# Patient Record
Sex: MALE | Race: WHITE | NOT HISPANIC OR LATINO | Employment: FULL TIME | URBAN - METROPOLITAN AREA
[De-identification: names, ages, dates, MRNs, and addresses within clinical notes are randomized per-mention and may not be internally consistent; named-entity substitution may affect disease eponyms.]

---

## 2017-06-12 ENCOUNTER — LAB CONVERSION - ENCOUNTER (OUTPATIENT)
Dept: OTHER | Facility: OTHER | Age: 52
End: 2017-06-12

## 2017-06-12 LAB
GLUCOSE, PLASMA (HISTORICAL): 94 MG/DL (ref 65–99)
HBA1C MFR BLD HPLC: 5.8 % OF TOTAL HGB

## 2017-06-22 ENCOUNTER — ALLSCRIPTS OFFICE VISIT (OUTPATIENT)
Dept: OTHER | Facility: OTHER | Age: 52
End: 2017-06-22

## 2017-12-14 DIAGNOSIS — Z12.5 ENCOUNTER FOR SCREENING FOR MALIGNANT NEOPLASM OF PROSTATE: ICD-10-CM

## 2017-12-14 DIAGNOSIS — I10 ESSENTIAL (PRIMARY) HYPERTENSION: ICD-10-CM

## 2017-12-14 DIAGNOSIS — R73.01 IMPAIRED FASTING GLUCOSE: ICD-10-CM

## 2017-12-19 ENCOUNTER — LAB CONVERSION - ENCOUNTER (OUTPATIENT)
Dept: OTHER | Facility: OTHER | Age: 52
End: 2017-12-19

## 2017-12-19 ENCOUNTER — GENERIC CONVERSION - ENCOUNTER (OUTPATIENT)
Dept: OTHER | Facility: OTHER | Age: 52
End: 2017-12-19

## 2017-12-19 LAB
A/G RATIO (HISTORICAL): 1.5 (CALC) (ref 1–2.5)
ALBUMIN SERPL BCP-MCNC: 4 G/DL (ref 3.6–5.1)
ALP SERPL-CCNC: 75 U/L (ref 40–115)
ALT SERPL W P-5'-P-CCNC: 31 U/L (ref 9–46)
AST SERPL W P-5'-P-CCNC: 20 U/L (ref 10–35)
BILIRUB SERPL-MCNC: 0.3 MG/DL (ref 0.2–1.2)
BUN SERPL-MCNC: 12 MG/DL (ref 7–25)
BUN/CREA RATIO (HISTORICAL): ABNORMAL (CALC) (ref 6–22)
CALCIUM SERPL-MCNC: 9.5 MG/DL (ref 8.6–10.3)
CHLORIDE SERPL-SCNC: 104 MMOL/L (ref 98–110)
CO2 SERPL-SCNC: 29 MMOL/L (ref 20–31)
CREAT SERPL-MCNC: 0.97 MG/DL (ref 0.7–1.33)
CREATININE, RANDOM URINE (HISTORICAL): 66 MG/DL (ref 20–370)
EGFR AFRICAN AMERICAN (HISTORICAL): 103 ML/MIN/1.73M2
EGFR-AMERICAN CALC (HISTORICAL): 89 ML/MIN/1.73M2
GAMMA GLOBULIN (HISTORICAL): 2.6 G/DL (CALC) (ref 1.9–3.7)
GLUCOSE (HISTORICAL): 101 MG/DL (ref 65–99)
HBA1C MFR BLD HPLC: 5.9 % OF TOTAL HGB
MAGNESIUM, UR (HISTORICAL): 0.2 MG/DL
MICROALBUMIN/CREATININE RATIO (HISTORICAL): 3 MCG/MG CREAT
POTASSIUM SERPL-SCNC: 4.9 MMOL/L (ref 3.5–5.3)
SODIUM SERPL-SCNC: 140 MMOL/L (ref 135–146)
TOTAL PROTEIN (HISTORICAL): 6.6 G/DL (ref 6.1–8.1)

## 2017-12-21 ENCOUNTER — ALLSCRIPTS OFFICE VISIT (OUTPATIENT)
Dept: OTHER | Facility: OTHER | Age: 52
End: 2017-12-21

## 2018-01-11 NOTE — RESULT NOTES
Discussion/Summary   blood sugars have improved from last time but still in Pre-diabetes range   watch sweets and fatty food   - Dr Blair Kingsley      Verified Results  (Q) HEMOGLOBIN A1c [id number] 07:06AM Jayde Leal   REPORT COMMENT:  FASTING:YES     Test Name Result Flag Reference   HEMOGLOBIN A1c 5 8 % of total Hgb H <5 7   For someone without known diabetes, a hemoglobin   A1c value between 5 7% and 6 4% is consistent with  prediabetes and should be confirmed with a   follow-up test      For someone with known diabetes, a value <7%  indicates that their diabetes is well controlled  A1c  targets should be individualized based on duration of  diabetes, age, comorbid conditions, and other  considerations  This assay result is consistent with an increased risk  of diabetes  Currently, no consensus exists regarding use of  hemoglobin A1c for diagnosis of diabetes for children         Signatures   Electronically signed by : Ronni Holloway MD; Jun 12 2017  2:38PM EST                       (Author)

## 2018-01-13 VITALS
HEART RATE: 72 BPM | WEIGHT: 204.13 LBS | DIASTOLIC BLOOD PRESSURE: 82 MMHG | RESPIRATION RATE: 16 BRPM | BODY MASS INDEX: 31.97 KG/M2 | SYSTOLIC BLOOD PRESSURE: 122 MMHG

## 2018-01-15 NOTE — RESULT NOTES
Verified Results  (1) COMPREHENSIVE METABOLIC PANEL [id number] 06:41PH Jayde Leal     Test Name Result Flag Reference   GLUCOSE 94 mg/dL  65-99   Fasting reference interval   UREA NITROGEN (BUN) 13 mg/dL  7-25   CREATININE 0 94 mg/dL  0 70-1 33   For patients >52years of age, the reference limit  for Creatinine is approximately 13% higher for people  identified as -American  eGFR NON-AFR  AMERICAN 93 mL/min/1 73m2  > OR = 60   eGFR AFRICAN AMERICAN 108 mL/min/1 73m2  > OR = 60   BUN/CREATININE RATIO   7-81   NOT APPLICABLE (calc)   SODIUM 140 mmol/L  135-146   POTASSIUM 4 8 mmol/L  3 5-5 3   CHLORIDE 105 mmol/L     CARBON DIOXIDE 25 mmol/L  20-31   CALCIUM 9 3 mg/dL  8 6-10 3   PROTEIN, TOTAL 6 6 g/dL  6 1-8 1   ALBUMIN 4 2 g/dL  3 6-5 1   GLOBULIN 2 4 g/dL (calc)  1 9-3 7   ALBUMIN/GLOBULIN RATIO 1 8 (calc)  1 0-2 5   BILIRUBIN, TOTAL 0 5 mg/dL  0 2-1 2   ALKALINE PHOSPHATASE 77 U/L     AST 21 U/L  10-35   ALT 34 U/L  9-46     (1) LIPID PANEL, FASTING [id number] 06:48AM Jayde Leal     Test Name Result Flag Reference   CHOLESTEROL, TOTAL 182 mg/dL  125-200   HDL CHOLESTEROL 53 mg/dL  > OR = 40   TRIGLICERIDES 563 mg/dL  <150   LDL-CHOLESTEROL 109 mg/dL (calc)  <130   Desirable range <100 mg/dL for patients with CHD or  diabetes and <70 mg/dL for diabetic patients with  known heart disease  CHOL/HDLC RATIO 3 4 (calc)  < OR = 5 0   NON HDL CHOLESTEROL 129 mg/dL (calc)     Target for non-HDL cholesterol is 30 mg/dL higher than   LDL cholesterol target  (Q) HEMOGLOBIN A1c [id number] 06:48AM Jayde Leal   REPORT COMMENT:  FASTING:YES     Test Name Result Flag Reference   HEMOGLOBIN A1c 6 1 % of total Hgb H <5 7   According to ADA guidelines, hemoglobin A1c <7 0%  represents optimal control in non-pregnant diabetic  patients  Different metrics may apply to specific  patient populations  Standards of Medical Care in  [phone]  Diabetes Care   2013;36:s11-s66     For the purpose of screening for the presence of  diabetes  <5 7%       Consistent with the absence of diabetes  5 7-6 4%    Consistent with increased risk for diabetes              (prediabetes)  >or=6 5%    Consistent with diabetes     This assay result is consistent with a higher risk  of diabetes  Currently, no consensus exists for use of hemoglobin  A1c for diagnosis of diabetes for children  Discussion/Summary   diabetes test is still in Pre-diabetes range   watch sweets and fatty food   normal liver and kidney function    cholesterol looks good!    overall looks good!   - Dr Pittman Blow   Electronically signed by : Yevgeniy Wright MD; Dec 21 2016  8:22AM EST                       (Author)

## 2018-01-16 NOTE — RESULT NOTES
Verified Results  (1) URIC ACID [id number] 04:48PM Jayde Leal   REPORT COMMENT:  FASTING:NO     Test Name Result Flag Reference   URIC ACID 6 2 mg/dL  4 0-8 0   Therapeutic target for gout patients: <6 0 mg/dL       Discussion/Summary   spoke with wife   patient will call back with updates in 2 days   - Dr Joshua Shaw   Electronically signed by : Cris Caldwell MD; May 25 2016  8:31AM EST                       (Author)

## 2018-01-18 NOTE — PROGRESS NOTES
Assessment    1  Encounter for preventive health examination (V70 0) (Z00 00)   2  Essential hypertension (401 9) (I10)   3  Encounter for smoking cessation counseling (V65 42,305 1) (Z71 6,Z72 0)   4  Impaired fasting glucose (790 21) (R73 01)   5  Gout (274 9) (M10 9)   6  Need for immunization against influenza (V04 81) (Z23)    Plan  Encounter for smoking cessation counseling    · Chantix Starting Month Nathen 0 5 MG X 11 & 1 MG X 42 Oral Tablet; TAKE ONE 0 5MG  TABLET DAILY ON DAYS 1-3, THEN ONE 0 5MG TABLET TWICE DAILY ON DAYS 4-7,  THEN ONE 1MG TABLET TWICE DAILY THEREAFTER  Essential hypertension    · Losartan Potassium 50 MG Oral Tablet; TAKE 1 TABLET DAILY AS DIRECTED  Gout, Impaired fasting glucose    · (1) GLUCOSE,  FASTING; Status:Active; Requested [id number];    · (1) HEMOGLOBIN A1C; Status:Active; Requested [id number]; Health Maintenance    · Follow-up visit in 1 year Evaluation and Treatment  Follow-up  Status: Hold For -  Scheduling  Requested for: 60Kwa4442   · Always use a seat belt and shoulder strap when riding or driving a motor vehicle ;  Status:Complete;   Done: 37SVP4769   · Begin a limited exercise program ; Status:Complete;   Done: 79PJZ5530   · Begin or continue regular aerobic exercise   Gradually work up to at least 3 sessions of 30  minutes of exercise a week ; Status:Complete;   Done: 45AZO1266   · Brush your teeth freq1 and floss at least once a day ; Status:Complete;   Done:  05VYT8090   · Some eating tips that can help you lose weight ; Status:Complete;   Done: 61ZYV8905   · Use a sun block product with an SPF of 15 or more ; Status:Complete;   Done:  99BMQ2078   · Vitamins can help you get daily requirements that your diet may not be giving you ;  Status:Complete;   Done: 18QHZ4735   · We recommend routine visits to a dentist ; Status:Complete;   Done: 77XOY4407   · Call [phone] if: You have any warning signs of skin cancer ; Status:Complete;    Done: 71JNG6583   · Call 911 if: You experience a new kind of chest pain (angina) or pressure ;  Status:Complete;   Done: 16LBK3568  Need for immunization against influenza    · Fluzone Quadrivalent 0 5 ML Intramuscular Suspension Prefilled Syringe    Discussion/Summary  Impression: health maintenance visit  Currently, he eats an adequate diet and has an adequate exercise regimen  Prostate cancer screening: the risks and benefits of prostate cancer screening were discussed and prostate cancer screening is current  Testicular cancer screening: the risks and benefits of testicular cancer screening were discussed and monthly self testicular exam was advised  Colorectal cancer screening: the risks and benefits of colorectal cancer screening were discussed and colorectal cancer screening is current  Screening lab work includes glucose  He was advised to be evaluated by an ophthalmologist and a dentist  Advice and education were given regarding nutrition, weight bearing exercise, weight loss, calcium supplements, cardiovascular risk reduction, alcohol use, sunscreen use, self skin examination and seat belt use  Patient discussion: discussed with the patient  Chief Complaint  pt here for physical      History of Present Illness  HM, Adult Male: The patient is being seen for a health maintenance evaluation  The last health maintenance visit was 1 year(s) ago  General Health: The patient's health since the last visit is described as good  He has regular dental visits  He complains of vision problems  He denies hearing loss  Immunizations status: not up to date  Lifestyle:  He consumes a diverse and healthy diet  He does not have any weight concerns  He does not exercise regularly  He does not use tobacco  He denies alcohol use  He denies drug use  Reproductive health:  the patient is sexually active  birth control is not being practiced  He denies erectile dysfunction  Screening: cancer screening reviewed and updated     metabolic screening reviewed and updated  risk screening reviewed and updated  Review of Systems    Constitutional: No fever or chills, feels well, no tiredness, no recent weight gain or weight loss  Eyes: No complaints of eye pain, no red eyes, no discharge from eyes, no itchy eyes  ENT: no complaints of earache, no hearing loss, no nosebleeds, no nasal discharge, no sore throat, no hoarseness  Cardiovascular: No complaints of slow heart rate, no fast heart rate, no chest pain, no palpitations, no leg claudication, no lower extremity  Respiratory: No complaints of shortness of breath, no wheezing, no cough, no SOB on exertion, no orthopnea or PND  Gastrointestinal: No complaints of abdominal pain, no constipation, no nausea or vomiting, no diarrhea or bloody stools  Genitourinary: No complaints of dysuria, no incontinence, no hesitancy, no nocturia, no genital lesion, no testicular pain  Musculoskeletal: No complaints of arthralgia, no myalgias, no joint swelling or stiffness, no limb pain or swelling  Integumentary: No complaints of skin rash or skin lesions, no itching, no skin wound, no dry skin  Neurological: No compliants of headache, no confusion, no convulsions, no numbness or tingling, no dizziness or fainting, no limb weakness, no difficulty walking  Psychiatric: Is not suicidal, no sleep disturbances, no anxiety or depression, no change in personality, no emotional problems  Endocrine: No complaints of proptosis, no hot flashes, no muscle weakness, no erectile dysfunction, no deepening of the voice, no feelings of weakness  Hematologic/Lymphatic: No complaints of swollen glands, no swollen glands in the neck, does not bleed easily, no easy bruising  Active Problems    1  Allergic rhinitis (477 9) (J30 9)   2  Encounter for screening colonoscopy (V76 51) (Z12 11)   3  Encounter for smoking cessation counseling (V65 42,305 1) (Z71 6,Z72 0)   4   Essential hypertension (401 9) (I10)   5  Gout (274 9) (M10 9)   6  Impaired fasting glucose (790 21) (R73 01)   7  Need for immunization against influenza (V04 81) (Z23)   8  Need for pneumococcal vaccination (V03 82) (Z23)   9  Need for Tdap vaccination (V06 1) (Z23)   10  S/P cardiac cath (V45 89) (Z98 890)   11  Screening for prostate cancer (V76 44) (Z12 5)    Past Medical History    · History of hypertension (V12 59) (Z86 79)   · S/P cardiac cath (V45 89) (Z98 890)    Surgical History    · Denied: History of Reported Prior Surgical / Procedural History    Family History  Mother    · Denied: Family history of Colon cancer   · Denied: Family history of Crohn's disease without complication, unspecified  gastrointestinal tract location   · Family history of diabetes mellitus (V18 0) (Z83 3)   · Denied: Family history of liver disease  Father    · Denied: Family history of Colon cancer   · Denied: Family history of Crohn's disease without complication, unspecified  gastrointestinal tract location   · Family history of cardiac disorder (V17 49) (Z82 49)   · Denied: Family history of liver disease  Brother    · Family history of cardiac disorder (V17 49) (Z82 49)    Social History    · Alcohol use   · Current smoker (305 1) (Z72 0)   · Daily caffeine consumption, 2-3 servings a day   · No drug use   · Social alcohol use (Z78 9)    Current Meds   1  Aspirin 81 MG TABS; Therapy: (Recorded:07May2014) to Recorded   2  Fish Oil 1200 MG Oral Capsule; take 1 capsule daily; Therapy: (Recorded:22May2014) to Recorded   3  Losartan Potassium 50 MG Oral Tablet; TAKE 1 TABLET DAILY; Therapy: 09DLC8842 to (Evaluate:06Dec2016)  Requested for: 39YBJ9279; Last   Rx:09Jun2016 Ordered   4  Multiple Vitamin TABS; Therapy: (Recorded:07May2014) to Recorded    Allergies    1   No Known Drug Allergies    Vitals   Recorded: 22Dec2016 06:00PM   Heart Rate 80   Respiration 18   Systolic 076   Diastolic 84   Height 5 ft 7 48 in   Weight 201 lb 8 oz   BMI Calculated 31 11   BSA Calculated 2 04     Physical Exam    Constitutional   General appearance: No acute distress, well appearing and well nourished  Head and Face   Head and face: Normal     Palpation of the face and sinuses: No sinus tenderness  Eyes   Conjunctiva and lids: No erythema, swelling or discharge  Pupils and irises: Equal, round, reactive to light  Ophthalmoscopic examination: Normal fundi and optic discs  Ears, Nose, Mouth, and Throat   External inspection of ears and nose: Normal     Otoscopic examination: Tympanic membranes translucent with normal light reflex  Canals patent without erythema  Hearing: Normal     Nasal mucosa, septum, and turbinates: Normal without edema or erythema  Lips, teeth, and gums: Normal, good dentition  Oropharynx: Normal with no erythema, edema, exudate or lesions  Neck   Neck: Supple, symmetric, trachea midline, no masses  Thyroid: Normal, no thyromegaly  Pulmonary   Respiratory effort: No increased work of breathing or signs of respiratory distress  Percussion of chest: Normal     Auscultation of lungs: Clear to auscultation  Cardiovascular   Palpation of heart: Normal PMI, no thrills  Auscultation of heart: Normal rate and rhythm, normal S1 and S2, no murmurs  Examination of extremities for edema and/or varicosities: Normal     Chest   Chest: Normal     Abdomen   Abdomen: Non-tender, no masses  Liver and spleen: No hepatomegaly or splenomegaly  Examination for hernias: No hernias appreciated  Lymphatic   Palpation of lymph nodes in neck: No lymphadenopathy  Palpation of lymph nodes in axillae: No lymphadenopathy  Musculoskeletal   Gait and station: Normal     Inspection/palpation of digits and nails: Normal without clubbing or cyanosis  Skin   Skin and subcutaneous tissue: Normal without rashes or lesions  Palpation of skin and subcutaneous tissue: Normal turgor      Neurologic   Cranial nerves: Cranial nerves 2-12 intact  Cortical function: Normal mental status  Reflexes: 2+ and symmetric  Sensation: No sensory loss  Coordination: Normal finger to nose and heel to shin  Psychiatric   Judgment and insight: Normal     Orientation to person, place and time: Normal     Recent and remote memory: Intact  Mood and affect: Normal        Results/Data  PHQ-2 Adult Depression Screening [id number] 06:01PM User, Roxann     Test Name Result Flag Reference   PHQ-2 Adult Depression Score 0     Over the last two weeks, how often have you been bothered by any of the following problems?   Little interest or pleasure in doing things: Not at all - 0  Feeling down, depressed, or hopeless: Not at all - 0   PHQ-2 Adult Depression Screening Negative         Future Appointments    Date/Time Provider Specialty Site   06/22/2017 04:30 PM Zahra Leal MD [address] [place] [address]     Signatures   Electronically signed by : Rutherford Goodpasture, MD; Dec 22 2016  6:52PM EST                       (Author)

## 2018-01-22 VITALS
HEIGHT: 68 IN | SYSTOLIC BLOOD PRESSURE: 122 MMHG | BODY MASS INDEX: 31.75 KG/M2 | RESPIRATION RATE: 16 BRPM | WEIGHT: 209.5 LBS | DIASTOLIC BLOOD PRESSURE: 92 MMHG | HEART RATE: 88 BPM

## 2018-01-23 NOTE — RESULT NOTES
Discussion/Summary   blood sugar is borderline elevated   pre-diabetes range   normal liver and kidney function    watch sweets and fatty food   - Dr Feli Sommer      Verified Results  (1) COMPREHENSIVE METABOLIC PANEL [id number] 12:34CT Jayde Leal     Test Name Result Flag Reference   GLUCOSE 101 mg/dL H 65-99   Fasting reference interval     For someone without known diabetes, a glucose value  between 100 and 125 mg/dL is consistent with  prediabetes and should be confirmed with a  follow-up test    UREA NITROGEN (BUN) 12 mg/dL  7-25   CREATININE 0 97 mg/dL  0 70-1 33   For patients >52years of age, the reference limit  for Creatinine is approximately 13% higher for people  identified as -American  eGFR NON-AFR   AMERICAN 89 mL/min/1 73m2  > OR = 60   eGFR AFRICAN AMERICAN 103 mL/min/1 73m2  > OR = 60   BUN/CREATININE RATIO   1-03   NOT APPLICABLE (calc)   SODIUM 140 mmol/L  135-146   POTASSIUM 4 9 mmol/L  3 5-5 3   CHLORIDE 104 mmol/L     CARBON DIOXIDE 29 mmol/L  20-31   CALCIUM 9 5 mg/dL  8 6-10 3   PROTEIN, TOTAL 6 6 g/dL  6 1-8 1   ALBUMIN 4 0 g/dL  3 6-5 1   GLOBULIN 2 6 g/dL (calc)  1 9-3 7   ALBUMIN/GLOBULIN RATIO 1 5 (calc)  1 0-2 5   BILIRUBIN, TOTAL 0 3 mg/dL  0 2-1 2   ALKALINE PHOSPHATASE 75 U/L     AST 20 U/L  10-35   ALT 31 U/L  9-46     (Q) MICROALBUMIN, RANDOM URINE (W/CREATININE) [id number] 07:15AM Jayde Leal     Test Name Result Flag Reference   CREATININE, RANDOM URINE 66 mg/dL     MICROALBUMIN 0 2 mg/dL     Reference Range  Not established   MICROALBUMIN/CREATININE$RATIO, RANDOM URINE 3 mcg/mg creat  <30   The ADA defines abnormalities in albumin  excretion as follows:     Category         Result (mcg/mg creatinine)     Normal                    <30  Microalbuminuria            Clinical albuminuria   > OR = 300     The ADA recommends that at least two of three  specimens collected within a 3-6 month period be  abnormal before considering a patient to be  within a diagnostic category  (Q) HEMOGLOBIN A1c [id number] 07:15AM Jayde Leal   REPORT COMMENT:  FASTING:YES     Test Name Result Flag Reference   HEMOGLOBIN A1c 5 9 % of total Hgb H <5 7   For someone without known diabetes, a hemoglobin   A1c value between 5 7% and 6 4% is consistent with  prediabetes and should be confirmed with a   follow-up test      For someone with known diabetes, a value <7%  indicates that their diabetes is well controlled  A1c  targets should be individualized based on duration of  diabetes, age, comorbid conditions, and other  considerations  This assay result is consistent with an increased risk  of diabetes  Currently, no consensus exists regarding use of  hemoglobin A1c for diagnosis of diabetes for children         Signatures   Electronically signed by : Brain Meadows MD; Dec 19 2017  8:08AM EST                       (Author)

## 2018-01-23 NOTE — PROGRESS NOTES
Assessment    1  Encounter for preventive health examination (V70 0) (Z00 00)   2  Essential hypertension (401 9) (I10)   3  Impaired fasting glucose (790 21) (R73 01)   4  Special screening, prostate cancer (V76 44) (Z12 5)    Plan  Essential hypertension    · Losartan Potassium 50 MG Oral Tablet; TAKE 1 TABLET DAILY AS DIRECTED   · (1) LIPID PANEL, FASTING; Status:Active; Requested for:12Psl9455;   Essential hypertension, Impaired fasting glucose, Special screening, prostate cancer    · (1) COMPREHENSIVE METABOLIC PANEL; Status:Active; Requested for:75Eti7816;    · (1) PSA (SCREEN) (Dx V76 44 Screen for Prostate Cancer); Status:Active; Requested  for:46Ssi0621;   Flu vaccine need    · Fluzone Quadrivalent 0 5 ML Intramuscular Suspension Prefilled Syringe  Health Maintenance    · Begin or continue regular aerobic exercise  Gradually work up to at least 3 sessions of 30  minutes of exercise a week ; Status:Complete;   Done: 38OTY2981   · Brush your teeth {freq1} and floss at least once a day ; Status:Complete;   Done:  44WCQ5800   · Drink plenty of fluids ; Status:Complete;   Done: 19JWJ2204   · Eat foods that are high in calcium ; Status:Complete;   Done: 00XRE5080   · Some eating tips that can help you lose weight ; Status:Complete;   Done: 75XPJ5721   · Use a sun block product with an SPF of 15 or more ; Status:Complete;   Done:  95ZLF1977   · We recommend routine visits to a dentist ; Status:Complete;   Done: 69AQH3763   · Call [phone] if: You have any warning signs of skin cancer ; Status:Complete;    Done: 63TEI5426   · Call 911 if: You experience a new kind of chest pain (angina) or pressure ;  Status:Complete;   Done: 37ZOS7060   · Follow-up visit in 1 year Evaluation and Treatment  Follow-up  Status: Complete  Done:  25IVJ9059  Impaired fasting glucose, Special screening, prostate cancer    · (1) HEMOGLOBIN A1C; Status:Active;  Requested for:07Gmw8005;     Discussion/Summary  health maintenance visit eats an adequate diet Prostate cancer screening: the risks and benefits of prostate cancer screening were discussed and PSA was ordered  Testicular cancer screening: the risks and benefits of testicular cancer screening were discussed and monthly self testicular exam was advised  Colorectal cancer screening: the risks and benefits of colorectal cancer screening were discussed and colorectal cancer screening is not indicated  Screening lab work includes lipid profile  The immunizations will be given as outlined in the orders  He was advised to be evaluated by a dentist and an ophthalmologist  Advice and education were given regarding nutrition, aerobic exercise, weight loss, calcium supplements, sunscreen use and vitamin D supplements  Patient discussion: discussed with the patient  The patient declines Hepatitis C screening  Chief Complaint  Pt here for Physical      History of Present Illness  HM, Adult Male: The patient is being seen for a health maintenance evaluation  The last health maintenance visit was 1 year(s) ago  General Health: The patient's health since the last visit is described as good  He has regular dental visits  He complains of vision problems  Vision care includes wearing reading glasses and an eye examination within the last year  He denies hearing loss  Immunizations status: not up to date  Lifestyle:  He consumes a diverse and healthy diet  He does not have any weight concerns  He exercises regularly  He does not use tobacco  He consumes alcohol  He reports occasional alcohol use  He typically drinks beer  Alcohol concern: The patient has no concerns about alcohol abuse  He denies drug use  Reproductive health:  the patient is sexually active  birth control is not being practiced  He denies erectile dysfunction  Screening: cancer screening reviewed and updated  metabolic screening reviewed and updated  risk screening reviewed and updated        Review of Systems    Constitutional: No fever or chills, feels well, no tiredness, no recent weight gain or weight loss  Eyes: No complaints of eye pain, no red eyes, no discharge from eyes, no itchy eyes  ENT: no complaints of earache, no hearing loss, no nosebleeds, no nasal discharge, no sore throat, no hoarseness  Cardiovascular: No complaints of slow heart rate, no fast heart rate, no chest pain, no palpitations, no leg claudication, no lower extremity  Respiratory: No complaints of shortness of breath, no wheezing, no cough, no SOB on exertion, no orthopnea or PND  Gastrointestinal: No complaints of abdominal pain, no constipation, no nausea or vomiting, no diarrhea or bloody stools  Genitourinary: No complaints of dysuria, no incontinence, no hesitancy, no nocturia, no genital lesion, no testicular pain  Musculoskeletal: No complaints of arthralgia, no myalgias, no joint swelling or stiffness, no limb pain or swelling  Integumentary: No complaints of skin rash or skin lesions, no itching, no skin wound, no dry skin  Neurological: No compliants of headache, no confusion, no convulsions, no numbness or tingling, no dizziness or fainting, no limb weakness, no difficulty walking  Psychiatric: Is not suicidal, no sleep disturbances, no anxiety or depression, no change in personality, no emotional problems  Endocrine: No complaints of proptosis, no hot flashes, no muscle weakness, no erectile dysfunction, no deepening of the voice, no feelings of weakness  Hematologic/Lymphatic: No complaints of swollen glands, no swollen glands in the neck, does not bleed easily, no easy bruising  Active Problems    1  Essential hypertension (401 9) (I10)   2  Gout (274 9) (M10 9)   3  Impaired fasting glucose (790 21) (R73 01)   4  S/P cardiac cath (V45 89) (Z98 890)   5   Special screening, prostate cancer (V76 44) (Z12 5)    Past Medical History    · History of hypertension (V12 59) (Z86 79)   · S/P cardiac cath (V45 89) (Z98 890)    Surgical History    · Denied: History of Reported Prior Surgical / Procedural History    Family History  Mother    · Denied: Family history of Colon cancer   · Denied: Family history of Crohn's disease without complication, unspecified  gastrointestinal tract location   · Family history of diabetes mellitus (V18 0) (Z83 3)   · Denied: Family history of liver disease  Father    · Denied: Family history of Colon cancer   · Denied: Family history of Crohn's disease without complication, unspecified  gastrointestinal tract location   · Family history of cardiac disorder (V17 49) (Z82 49)   · Denied: Family history of liver disease  Brother    · Family history of cardiac disorder (V17 49) (Z82 49)    Social History    · Alcohol use   · Current smoker (305 1) (Z72 0)   · Daily caffeine consumption, 2-3 servings a day   · No drug use   · Social alcohol use (Z78 9)    Current Meds   1  Aspirin 81 MG TABS; Therapy: (Yael Peoples) to Recorded   2  [facility] Nicotine 14 MG/24HR Transdermal Patch 24 Hour; APPLY 1 PATCH DAILY AS   DIRECTED; Therapy: 04Dyo1645 to (Evaluate:98Vri2611)  Requested for: 29Oct2017; Last   Rx:29Oct2017 Ordered   3  Fish Oil 1200 MG Oral Capsule; take 1 capsule daily; Therapy: (Recorded:42Rox2931) to Recorded   4  Losartan Potassium 50 MG Oral Tablet; TAKE 1 TABLET DAILY AS DIRECTED; Therapy: 80EJM7157 to (Evaluate:17Jun2018)  Requested for: 31JUU7148; Last   Rx:22Jun2017 Ordered   5  Multiple Vitamin TABS; Therapy: (Recorded:16Gvx9611) to Recorded    Allergies    1  No Known Drug Allergies    Vitals   Recorded: 77Fan2512 12:24PM   Heart Rate 88   Respiration 16   Systolic 913   Diastolic 92   Height 5 ft 7 68 in   Weight 209 lb 8 oz   BMI Calculated 32 16   BSA Calculated 2 08     Physical Exam    Constitutional   General appearance: No acute distress, well appearing and well nourished      Head and Face   Head and face: Normal     Palpation of the face and sinuses: No sinus tenderness  Eyes   Conjunctiva and lids: No erythema, swelling or discharge  Pupils and irises: Equal, round, reactive to light  Ophthalmoscopic examination: Normal fundi and optic discs  Ears, Nose, Mouth, and Throat   External inspection of ears and nose: Normal     Otoscopic examination: Tympanic membranes translucent with normal light reflex  Canals patent without erythema  Hearing: Normal     Nasal mucosa, septum, and turbinates: Normal without edema or erythema  Lips, teeth, and gums: Normal, good dentition  Oropharynx: Normal with no erythema, edema, exudate or lesions  Neck   Neck: Supple, symmetric, trachea midline, no masses  Thyroid: Normal, no thyromegaly  Pulmonary   Respiratory effort: No increased work of breathing or signs of respiratory distress  Percussion of chest: Normal     Auscultation of lungs: Clear to auscultation  Cardiovascular   Palpation of heart: Normal PMI, no thrills  Auscultation of heart: Normal rate and rhythm, normal S1 and S2, no murmurs  Examination of extremities for edema and/or varicosities: Normal     Chest   Chest: Normal     Abdomen   Abdomen: Non-tender, no masses  Liver and spleen: No hepatomegaly or splenomegaly  Examination for hernias: No hernias appreciated  Lymphatic   Palpation of lymph nodes in neck: No lymphadenopathy  Palpation of lymph nodes in axillae: No lymphadenopathy  Palpation of lymph nodes in groin: No lymphadenopathy  Palpation of lymph nodes in other areas: No lymphadenopathy  Musculoskeletal   Gait and station: Normal     Inspection/palpation of digits and nails: Normal without clubbing or cyanosis  Inspection/palpation of joints, bones, and muscles: Normal     Range of motion: Normal     Stability: Normal     Muscle strength/tone: Normal     Skin   Skin and subcutaneous tissue: Normal without rashes or lesions  Palpation of skin and subcutaneous tissue: Normal turgor  Neurologic   Cranial nerves: Cranial nerves 2-12 intact  Cortical function: Normal mental status  Reflexes: 2+ and symmetric  Sensation: No sensory loss  Coordination: Normal finger to nose and heel to shin  Psychiatric   Judgment and insight: Normal     Orientation to person, place and time: Normal     Recent and remote memory: Intact      Mood and affect: Normal        Future Appointments    Date/Time Provider Specialty Site   06/21/2018 06:15 PM Vishnu Leal MD [facility]   Electronically signed by : Amaris Obrien MD; Dec 21 2017  1:10PM EST                       (Author)

## 2018-11-30 ENCOUNTER — OFFICE VISIT (OUTPATIENT)
Dept: FAMILY MEDICINE CLINIC | Facility: CLINIC | Age: 53
End: 2018-11-30
Payer: COMMERCIAL

## 2018-11-30 VITALS
HEIGHT: 69 IN | BODY MASS INDEX: 31.4 KG/M2 | WEIGHT: 212 LBS | DIASTOLIC BLOOD PRESSURE: 68 MMHG | TEMPERATURE: 99.8 F | OXYGEN SATURATION: 98 % | RESPIRATION RATE: 14 BRPM | SYSTOLIC BLOOD PRESSURE: 106 MMHG | HEART RATE: 94 BPM

## 2018-11-30 DIAGNOSIS — K62.5 RECTAL BLEEDING: ICD-10-CM

## 2018-11-30 DIAGNOSIS — R10.32 LEFT LOWER QUADRANT PAIN: Primary | ICD-10-CM

## 2018-11-30 PROCEDURE — 3008F BODY MASS INDEX DOCD: CPT | Performed by: FAMILY MEDICINE

## 2018-11-30 PROCEDURE — 99214 OFFICE O/P EST MOD 30 MIN: CPT | Performed by: FAMILY MEDICINE

## 2018-11-30 RX ORDER — METRONIDAZOLE 500 MG/1
500 TABLET ORAL EVERY 8 HOURS SCHEDULED
Qty: 30 TABLET | Refills: 0 | Status: SHIPPED | OUTPATIENT
Start: 2018-11-30 | End: 2018-11-30 | Stop reason: SDUPTHER

## 2018-11-30 RX ORDER — CIPROFLOXACIN 500 MG/1
500 TABLET, FILM COATED ORAL EVERY 12 HOURS SCHEDULED
Qty: 14 TABLET | Refills: 0 | Status: SHIPPED | OUTPATIENT
Start: 2018-11-30 | End: 2018-11-30 | Stop reason: SDUPTHER

## 2018-11-30 RX ORDER — LOSARTAN POTASSIUM 50 MG/1
50 TABLET ORAL DAILY
Refills: 2 | COMMUNITY
Start: 2018-11-21 | End: 2018-12-23 | Stop reason: SDUPTHER

## 2018-11-30 RX ORDER — CIPROFLOXACIN 500 MG/1
500 TABLET, FILM COATED ORAL EVERY 12 HOURS SCHEDULED
Qty: 14 TABLET | Refills: 0 | Status: SHIPPED | OUTPATIENT
Start: 2018-11-30 | End: 2018-12-09 | Stop reason: HOSPADM

## 2018-11-30 RX ORDER — ONDANSETRON 4 MG/1
4 TABLET, ORALLY DISINTEGRATING ORAL EVERY 6 HOURS PRN
Qty: 20 TABLET | Refills: 0 | Status: SHIPPED | OUTPATIENT
Start: 2018-11-30 | End: 2019-03-22 | Stop reason: ALTCHOICE

## 2018-11-30 RX ORDER — CHLORAL HYDRATE 500 MG
1 CAPSULE ORAL DAILY
COMMUNITY
End: 2018-12-01

## 2018-11-30 RX ORDER — METRONIDAZOLE 500 MG/1
500 TABLET ORAL EVERY 8 HOURS SCHEDULED
Qty: 30 TABLET | Refills: 0 | Status: SHIPPED | OUTPATIENT
Start: 2018-11-30 | End: 2018-12-09 | Stop reason: HOSPADM

## 2018-11-30 NOTE — PROGRESS NOTES
Assessment/Plan:         Diagnoses and all orders for this visit:    Left lower quadrant pain  -     CT abdomen pelvis w wo contrast; Future  -     CBC and differential; Future  -     Comprehensive metabolic panel  -     Discontinue: ciprofloxacin (CIPRO) 500 mg tablet; Take 1 tablet (500 mg total) by mouth every 12 (twelve) hours for 7 days  -     Discontinue: metroNIDAZOLE (FLAGYL) 500 mg tablet; Take 1 tablet (500 mg total) by mouth every 8 (eight) hours for 10 days  -     ciprofloxacin (CIPRO) 500 mg tablet; Take 1 tablet (500 mg total) by mouth every 12 (twelve) hours for 7 days  -     metroNIDAZOLE (FLAGYL) 500 mg tablet; Take 1 tablet (500 mg total) by mouth every 8 (eight) hours for 10 days  -     ondansetron (ZOFRAN-ODT) 4 mg disintegrating tablet; Take 1 tablet (4 mg total) by mouth every 6 (six) hours as needed for nausea or vomiting  -     Ambulatory referral to Gastroenterology    Rectal bleeding  -     CT abdomen pelvis w wo contrast; Future  -     CBC and differential; Future  -     Comprehensive metabolic panel  -     ondansetron (ZOFRAN-ODT) 4 mg disintegrating tablet; Take 1 tablet (4 mg total) by mouth every 6 (six) hours as needed for nausea or vomiting  -     Ambulatory referral to Gastroenterology    Other orders  -     aspirin 81 MG tablet; Take by mouth  -     Omega-3 Fatty Acids (FISH OIL) 1,000 mg; Take 1 capsule by mouth daily  -     losartan (COZAAR) 50 mg tablet;       suspect Diverticulitis   To get CT allen   Avoid triggers  To see GI     Subjective:      Patient ID: Alma Rosa Harry is a 46 y o  male  Abdominal Pain   This is a new problem  The current episode started more than 1 month ago  The onset quality is sudden  The problem occurs every several days  The problem has been waxing and waning  The pain is located in the LLQ  The pain is at a severity of 2/10  The pain is mild  The abdominal pain does not radiate  Associated symptoms include anorexia and diarrhea   Pertinent negatives include no arthralgias, belching, constipation, dysuria, fever, flatus, frequency, headaches, hematochezia, hematuria, melena, myalgias, nausea, vomiting or weight loss  The pain is aggravated by eating  He has tried nothing for the symptoms  The treatment provided no relief      + rectal bleeding with most BM   No constipation   + Diarrhea  No vomiting or fever     The following portions of the patient's history were reviewed and updated as appropriate: allergies, current medications, past family history, past medical history, past social history, past surgical history and problem list     Review of Systems   Constitutional: Positive for fatigue  Negative for fever and weight loss  HENT: Negative  Eyes: Negative  Respiratory: Negative  Cardiovascular: Negative  Gastrointestinal: Positive for abdominal pain, anorexia and diarrhea  Negative for constipation, flatus, hematochezia, melena, nausea and vomiting  Genitourinary: Negative  Negative for dysuria, frequency and hematuria  Musculoskeletal: Negative for arthralgias and myalgias  Allergic/Immunologic: Negative  Neurological: Negative  Negative for headaches  Hematological: Negative  Psychiatric/Behavioral: Negative  Objective:      /68 (BP Location: Left arm, Patient Position: Sitting, Cuff Size: Standard)   Pulse 94   Temp 99 8 °F (37 7 °C)   Resp 14   Ht 5' 9" (1 753 m)   Wt 96 2 kg (212 lb)   SpO2 98%   BMI 31 31 kg/m²          Physical Exam   Constitutional: He appears well-developed and well-nourished  HENT:   Head: Normocephalic and atraumatic  Mouth/Throat: No oropharyngeal exudate  Eyes: Pupils are equal, round, and reactive to light  EOM are normal    Neck: Normal range of motion  Neck supple  Cardiovascular: Normal rate and regular rhythm  Pulmonary/Chest: Effort normal and breath sounds normal  No respiratory distress  He has no wheezes  Abdominal: There is tenderness     Left lower quadrant    Psychiatric: He has a normal mood and affect   His behavior is normal

## 2018-12-01 ENCOUNTER — HOSPITAL ENCOUNTER (EMERGENCY)
Facility: HOSPITAL | Age: 53
Discharge: HOME | End: 2018-12-01
Attending: EMERGENCY MEDICINE | Admitting: EMERGENCY MEDICINE
Payer: COMMERCIAL

## 2018-12-01 ENCOUNTER — TELEPHONE (OUTPATIENT)
Dept: OTHER | Facility: OTHER | Age: 53
End: 2018-12-01

## 2018-12-01 ENCOUNTER — APPOINTMENT (EMERGENCY)
Dept: RADIOLOGY | Facility: HOSPITAL | Age: 53
End: 2018-12-01
Payer: COMMERCIAL

## 2018-12-01 VITALS
TEMPERATURE: 97.9 F | OXYGEN SATURATION: 94 % | HEART RATE: 90 BPM | SYSTOLIC BLOOD PRESSURE: 120 MMHG | RESPIRATION RATE: 18 BRPM | DIASTOLIC BLOOD PRESSURE: 61 MMHG

## 2018-12-01 DIAGNOSIS — K92.1 BLOOD IN STOOL: ICD-10-CM

## 2018-12-01 DIAGNOSIS — K57.92 DIVERTICULITIS: Primary | ICD-10-CM

## 2018-12-01 DIAGNOSIS — E87.6 HYPOKALEMIA: ICD-10-CM

## 2018-12-01 LAB
ABO GROUP BLD: NORMAL
ALBUMIN SERPL BCP-MCNC: 2.3 G/DL (ref 3.5–5)
ALBUMIN SERPL-MCNC: 3.2 G/DL (ref 3.6–5.1)
ALBUMIN/GLOB SERPL: 1.1 (CALC) (ref 1–2.5)
ALP SERPL-CCNC: 70 U/L (ref 40–115)
ALP SERPL-CCNC: 72 U/L (ref 46–116)
ALT SERPL W P-5'-P-CCNC: 23 U/L (ref 12–78)
ALT SERPL-CCNC: 20 U/L (ref 9–46)
ANION GAP SERPL CALCULATED.3IONS-SCNC: 8 MMOL/L (ref 4–13)
AST SERPL W P-5'-P-CCNC: 10 U/L (ref 5–45)
AST SERPL-CCNC: 11 U/L (ref 10–35)
BASOPHILS # BLD AUTO: 71 CELLS/UL (ref 0–200)
BASOPHILS # BLD MANUAL: 0 THOUSAND/UL (ref 0–0.1)
BASOPHILS NFR BLD AUTO: 0.5 %
BASOPHILS NFR MAR MANUAL: 0 % (ref 0–1)
BILIRUB SERPL-MCNC: 0.4 MG/DL (ref 0.2–1.2)
BILIRUB SERPL-MCNC: 0.45 MG/DL (ref 0.2–1)
BLD GP AB SCN SERPL QL: NEGATIVE
BUN SERPL-MCNC: 11 MG/DL (ref 7–25)
BUN SERPL-MCNC: 12 MG/DL (ref 5–25)
BUN/CREAT SERPL: ABNORMAL (CALC) (ref 6–22)
CALCIUM SERPL-MCNC: 8.5 MG/DL (ref 8.6–10.3)
CALCIUM SERPL-MCNC: 8.7 MG/DL (ref 8.3–10.1)
CHLORIDE SERPL-SCNC: 101 MMOL/L (ref 98–110)
CHLORIDE SERPL-SCNC: 99 MMOL/L (ref 100–108)
CO2 SERPL-SCNC: 25 MMOL/L (ref 21–32)
CO2 SERPL-SCNC: 30 MMOL/L (ref 20–32)
CREAT SERPL-MCNC: 1 MG/DL (ref 0.6–1.3)
CREAT SERPL-MCNC: 1.01 MG/DL (ref 0.7–1.33)
EOSINOPHIL # BLD AUTO: 888 CELLS/UL (ref 15–500)
EOSINOPHIL # BLD MANUAL: 0.91 THOUSAND/UL (ref 0–0.4)
EOSINOPHIL NFR BLD AUTO: 6.3 %
EOSINOPHIL NFR BLD MANUAL: 7 % (ref 0–6)
ERYTHROCYTE [DISTWIDTH] IN BLOOD BY AUTOMATED COUNT: 12.3 % (ref 11–15)
ERYTHROCYTE [DISTWIDTH] IN BLOOD BY AUTOMATED COUNT: 12.7 % (ref 11.6–15.1)
GFR SERPL CREATININE-BSD FRML MDRD: 85 ML/MIN/1.73SQ M
GLOBULIN SER CALC-MCNC: 2.8 G/DL (CALC) (ref 1.9–3.7)
GLUCOSE SERPL-MCNC: 110 MG/DL (ref 65–139)
GLUCOSE SERPL-MCNC: 122 MG/DL (ref 65–140)
HCT VFR BLD AUTO: 35.2 % (ref 36.5–49.3)
HCT VFR BLD AUTO: 36.7 % (ref 38.5–50)
HGB BLD-MCNC: 12 G/DL (ref 12–17)
HGB BLD-MCNC: 12.5 G/DL (ref 13.2–17.1)
INR PPP: 1.15 (ref 0.86–1.17)
LIPASE SERPL-CCNC: 51 U/L (ref 73–393)
LYMPHOCYTES # BLD AUTO: 1.82 THOUSAND/UL (ref 0.6–4.47)
LYMPHOCYTES # BLD AUTO: 14 % (ref 14–44)
LYMPHOCYTES # BLD AUTO: 2651 CELLS/UL (ref 850–3900)
LYMPHOCYTES NFR BLD AUTO: 18.8 %
MCH RBC QN AUTO: 30.6 PG (ref 27–33)
MCH RBC QN AUTO: 30.9 PG (ref 26.8–34.3)
MCHC RBC AUTO-ENTMCNC: 34.1 G/DL (ref 31.4–37.4)
MCHC RBC AUTO-ENTMCNC: 34.1 G/DL (ref 32–36)
MCV RBC AUTO: 89.7 FL (ref 80–100)
MCV RBC AUTO: 91 FL (ref 82–98)
METAMYELOCYTES NFR BLD MANUAL: 1 % (ref 0–1)
MONOCYTES # BLD AUTO: 1.56 THOUSAND/UL (ref 0–1.22)
MONOCYTES # BLD AUTO: 1960 CELLS/UL (ref 200–950)
MONOCYTES NFR BLD AUTO: 13.9 %
MONOCYTES NFR BLD: 12 % (ref 4–12)
NEUTROPHILS # BLD AUTO: 8531 CELLS/UL (ref 1500–7800)
NEUTROPHILS # BLD MANUAL: 8.44 THOUSAND/UL (ref 1.85–7.62)
NEUTROPHILS NFR BLD AUTO: 60.5 %
NEUTS BAND NFR BLD MANUAL: 12 % (ref 0–8)
NEUTS SEG NFR BLD AUTO: 53 % (ref 43–75)
NRBC BLD AUTO-RTO: 0 /100 WBCS
PLATELET # BLD AUTO: 439 THOUSANDS/UL (ref 149–390)
PLATELET # BLD AUTO: 539 THOUSAND/UL (ref 140–400)
PLATELET BLD QL SMEAR: ABNORMAL
PMV BLD AUTO: 8.4 FL (ref 8.9–12.7)
PMV BLD REES-ECKER: 8.9 FL (ref 7.5–12.5)
POLYCHROMASIA BLD QL SMEAR: PRESENT
POTASSIUM SERPL-SCNC: 3.1 MMOL/L (ref 3.5–5.3)
POTASSIUM SERPL-SCNC: 4 MMOL/L (ref 3.5–5.3)
PROT SERPL-MCNC: 6 G/DL (ref 6.1–8.1)
PROT SERPL-MCNC: 6.3 G/DL (ref 6.4–8.2)
PROTHROMBIN TIME: 14.8 SECONDS (ref 11.8–14.2)
RBC # BLD AUTO: 3.88 MILLION/UL (ref 3.88–5.62)
RBC # BLD AUTO: 4.09 MILLION/UL (ref 4.2–5.8)
RBC MORPH BLD: PRESENT
RH BLD: POSITIVE
SL AMB EGFR AFRICAN AMERICAN: 97 ML/MIN/1.73M2
SL AMB EGFR NON AFRICAN AMERICAN: 84 ML/MIN/1.73M2
SODIUM SERPL-SCNC: 132 MMOL/L (ref 136–145)
SODIUM SERPL-SCNC: 138 MMOL/L (ref 135–146)
SPECIMEN EXPIRATION DATE: NORMAL
TROPONIN I SERPL-MCNC: <0.02 NG/ML
VARIANT LYMPHS # BLD AUTO: 1 %
WBC # BLD AUTO: 12.98 THOUSAND/UL (ref 4.31–10.16)
WBC # BLD AUTO: 14.1 THOUSAND/UL (ref 3.8–10.8)

## 2018-12-01 PROCEDURE — 99285 EMERGENCY DEPT VISIT HI MDM: CPT

## 2018-12-01 PROCEDURE — 96374 THER/PROPH/DIAG INJ IV PUSH: CPT

## 2018-12-01 PROCEDURE — 86901 BLOOD TYPING SEROLOGIC RH(D): CPT | Performed by: EMERGENCY MEDICINE

## 2018-12-01 PROCEDURE — 74178 CT ABD&PLV WO CNTR FLWD CNTR: CPT

## 2018-12-01 PROCEDURE — 36415 COLL VENOUS BLD VENIPUNCTURE: CPT | Performed by: EMERGENCY MEDICINE

## 2018-12-01 PROCEDURE — 83690 ASSAY OF LIPASE: CPT | Performed by: EMERGENCY MEDICINE

## 2018-12-01 PROCEDURE — 93005 ELECTROCARDIOGRAM TRACING: CPT | Performed by: FAMILY MEDICINE

## 2018-12-01 PROCEDURE — 85007 BL SMEAR W/DIFF WBC COUNT: CPT | Performed by: EMERGENCY MEDICINE

## 2018-12-01 PROCEDURE — 84484 ASSAY OF TROPONIN QUANT: CPT | Performed by: EMERGENCY MEDICINE

## 2018-12-01 PROCEDURE — 71046 X-RAY EXAM CHEST 2 VIEWS: CPT

## 2018-12-01 PROCEDURE — 85610 PROTHROMBIN TIME: CPT | Performed by: EMERGENCY MEDICINE

## 2018-12-01 PROCEDURE — 86850 RBC ANTIBODY SCREEN: CPT | Performed by: EMERGENCY MEDICINE

## 2018-12-01 PROCEDURE — 85027 COMPLETE CBC AUTOMATED: CPT | Performed by: EMERGENCY MEDICINE

## 2018-12-01 PROCEDURE — 96375 TX/PRO/DX INJ NEW DRUG ADDON: CPT

## 2018-12-01 PROCEDURE — 80053 COMPREHEN METABOLIC PANEL: CPT | Performed by: EMERGENCY MEDICINE

## 2018-12-01 PROCEDURE — 96361 HYDRATE IV INFUSION ADD-ON: CPT

## 2018-12-01 PROCEDURE — 86900 BLOOD TYPING SEROLOGIC ABO: CPT | Performed by: EMERGENCY MEDICINE

## 2018-12-01 RX ORDER — POTASSIUM CHLORIDE 750 MG/1
10 TABLET, EXTENDED RELEASE ORAL 2 TIMES DAILY
Qty: 10 TABLET | Refills: 0 | Status: SHIPPED | OUTPATIENT
Start: 2018-12-01 | End: 2019-03-22 | Stop reason: ALTCHOICE

## 2018-12-01 RX ORDER — POTASSIUM CHLORIDE 20 MEQ/1
40 TABLET, EXTENDED RELEASE ORAL ONCE
Status: COMPLETED | OUTPATIENT
Start: 2018-12-01 | End: 2018-12-01

## 2018-12-01 RX ORDER — HYDROMORPHONE HCL/PF 1 MG/ML
0.5 SYRINGE (ML) INJECTION ONCE
Status: DISCONTINUED | OUTPATIENT
Start: 2018-12-01 | End: 2018-12-01

## 2018-12-01 RX ORDER — MORPHINE SULFATE 10 MG/ML
8 INJECTION, SOLUTION INTRAMUSCULAR; INTRAVENOUS ONCE
Status: COMPLETED | OUTPATIENT
Start: 2018-12-01 | End: 2018-12-01

## 2018-12-01 RX ORDER — KETOROLAC TROMETHAMINE 30 MG/ML
15 INJECTION, SOLUTION INTRAMUSCULAR; INTRAVENOUS ONCE
Status: COMPLETED | OUTPATIENT
Start: 2018-12-01 | End: 2018-12-01

## 2018-12-01 RX ADMIN — POTASSIUM CHLORIDE 40 MEQ: 1500 TABLET, EXTENDED RELEASE ORAL at 08:07

## 2018-12-01 RX ADMIN — MORPHINE SULFATE 8 MG: 10 INJECTION INTRAVENOUS at 07:06

## 2018-12-01 RX ADMIN — KETOROLAC TROMETHAMINE 15 MG: 30 INJECTION, SOLUTION INTRAMUSCULAR at 07:09

## 2018-12-01 RX ADMIN — SODIUM CHLORIDE 1000 ML: 0.9 INJECTION, SOLUTION INTRAVENOUS at 06:33

## 2018-12-01 RX ADMIN — IOHEXOL 100 ML: 350 INJECTION, SOLUTION INTRAVENOUS at 07:30

## 2018-12-01 NOTE — TELEPHONE ENCOUNTER
Lj Masoud Ludwig Walden 1965  CONFIDENTIALTY NOTICE: This fax transmission is intended only for the addressee  It contains information that is legally privileged,  confidential or otherwise protected from use or disclosure  If you are not the intended recipient, you are strictly prohibited from reviewing,  disclosing, copying using or disseminating any of this information or taking any action in reliance on or regarding this information  If you have  received this fax in error, please notify us immediately by telephone so that we can arrange for its return to us  Page:   Call Id: [id number]  Health Call  Standard Call Report  Health Call  Patient Name: Katelyn Reese  Gender: Male  : 1965  Age: 46 Y 6 M  Return Phone  Number: [phone] (Home)  Address: [address] [place] [address]  City/[facility]: [place]   [phone]  [phone]  Practice Name: [facility] Charged: [facility] 3  Physician:  [address] [place] [address] Name:  Relationship To  Patient: Self  Return Phone Number: [phone] (Home)  Presenting Problem: "I have severe pain in my abdomen "  Service Type: Triage  Charged Service 1:  Pharmacy Name and  Number:  Nurse Assessment  Nurse: Dede Edwards Date/Time: 2018 5:56:37 PM  Type of assessment required:  ---General (Adult or Child)  Duration of Current S/S  ---Started 2 days ago  Location/Radiation  ---GI  Temperature (F) and route:  ---Denies fever  Symptom Specific Meds (Dose/Time):  ---Ciprofloxacin 500mg PO BID started 2018 Metronidazole 500mg PO every 8  hours  Other S/S  ---Patient seen in office yesterday for rectal bleeding  Tests performed, diagnosed with  Diverticulitis / Colitis  Started on antibiotic  Patient has had a rapid increase in pain in  abdomen  No BM today, "only passing bright red blood "  Pain Scale on scale of 1-10, 10 being the worst:  ---9 out of 10  Symptom progression:  Pain is increasing rapidly    ---worse  Intake and Output  Katelyn Reese 1965  CONFIDENTIALTY NOTICE: This fax transmission is intended only for the addressee  It contains information that is legally privileged,  confidential or otherwise protected from use or disclosure  If you are not the intended recipient, you are strictly prohibited from reviewing,  disclosing, copying using or disseminating any of this information or taking any action in reliance on or regarding this information  If you have  received this fax in error, please notify us immediately by telephone so that we can arrange for its return to us  Page: 2 of 2  Call Id: [id number]  Nurse Assessment  ---Poor appetite but is drinking fluids well  Passing blood rectally / Voiding normally  Last Exam/Treatment:  ---Seen in office on 11/30/18 for these symptoms and seen in [address] [place] [address] today for  these symptoms  Protocols  Protocol Title Nurse Date/Time  Abdominal Pain - Male Jaciel Wong 12/1/2018 6:06:57 PM  Question Caller Affirmed  Disp  Time Disposition Final User  12/1/2018 6:07:39 PM Go to ED Now Lora Ng RN, Daisy Sanchez  12/1/2018 6:09:03 PM RN Triaged Yes Lora Ng RN, [facility] Advice Given Per Protocol  GO TO ED NOW: You need to be seen in the Emergency Department  Go to the ER at _[facility]  Leave  now  Drive carefully  DRIVING: Another adult should drive  NOTHING BY MOUTH: Do not eat or drink anything for now  (Reason:  condition may need surgery and general anesthesia ) BRING MEDICINES: * Please bring a list of your current medicines when you go  to the Emergency Department (ER)  * It is also a good idea to bring the pill bottles too  This will help the doctor to make certain you are  taking the right medicines and the right dose  CARE ADVICE given per Abdominal Pain, Male (Adult) guideline  Caller Understands: Yes  Caller Disagree/Comply: Comply  PreDisposition: Unsure    Patient asked several times if pain medication could be called in   Patient received Dilaudid, Morphine, and Toradol while in the ER  Was sent home "and they didn't give me anything for the pain " Informed patient that Dr Meredith Pineda is not on call today and that another MD is covering  Narcotics cannot be called in or sent from answering service  Since patient is having sever pain (9 out of 10), patient needs to be seen in ED for Re-Evaluation   Patient stated that he would go back

## 2018-12-01 NOTE — ED NOTES
Patient transported to [address] [place] [address], [address] [place] [address]  12/01/18 0624

## 2018-12-01 NOTE — ED PROVIDER NOTES
History  Chief Complaint   Patient presents with    Rectal Bleeding     pt reports rectal bleeding for "weeks now"  REports going to PCP and being DX with diverticulitis and put on ABX  PT presenting today with continuing rectal bleeding that is bright red with purple clots per the patient's description     48-year-old male presents for evaluation of 4 days of bright red blood per rectum  Patient was evaluated by PCP yesterday and diagnosed with diverticulitis  He was started on Cipro and Flagyl and took 1 day's worth of antibiotics  Advised to present to the emergency department if symptoms became worse  Patient states he also has diffuse abdominal pain most prominent in the left lower quadrant  Has been having intermittent bleeding from rectum for the last few weeks but has become significantly worse over the last 4 days  Denies rectal pain or pain with defecation  Denies chest pain or reports intermittent shortness of breath that he attributes to his abdominal pain  Patient is on aspirin for cardiac risk factors  Prior to Admission Medications   Prescriptions Last Dose Informant Patient Reported? Taking? MULTIPLE VITAMIN PO   Yes Yes   Sig: Take by mouth  aspirin 81 MG tablet   Yes Yes   Sig: Take 81 mg by mouth daily     ciprofloxacin (CIPRO) 500 mg tablet   No Yes   Sig: Take 1 tablet (500 mg total) by mouth every 12 (twelve) hours for 7 days   losartan (COZAAR) 50 mg tablet   Yes Yes   Sig: Take 50 mg by mouth daily     metroNIDAZOLE (FLAGYL) 500 mg tablet   No Yes   Sig: Take 1 tablet (500 mg total) by mouth every 8 (eight) hours for 10 days   ondansetron (ZOFRAN-ODT) 4 mg disintegrating tablet   No Yes   Sig: Take 1 tablet (4 mg total) by mouth every 6 (six) hours as needed for nausea or vomiting      Facility-Administered Medications: None       Past Medical History:   Diagnosis Date    Hypertension        Past Surgical History:   Procedure Laterality Date    CARDIAC CATHETERIZATION  WY COLONOSCOPY FLX DX W/COLLJ SPEC WHEN PFRMD N/A 4/25/2016    Procedure: COLONOSCOPY;  Surgeon: Sam Morrison MD;  Location: [facility]; Service: Gastroenterology       Family History   Problem Relation Age of Onset    Diabetes Mother     Heart disease Father         Cardiac Disorder    Heart disease Brother         Cardiac Disorder     I have reviewed and agree with the history as documented  Social History   Substance Use Topics    Smoking status: Current Every Day Smoker    Smokeless tobacco: Current User    Alcohol use Yes      Comment: social        Review of Systems   Constitutional: Positive for chills  Negative for diaphoresis and fever  HENT: Negative for congestion and rhinorrhea  Eyes: Negative for pain and visual disturbance  Respiratory: Negative for cough, shortness of breath and wheezing  Cardiovascular: Negative for chest pain and leg swelling  Gastrointestinal: Positive for abdominal pain, blood in stool and nausea  Negative for constipation, diarrhea and vomiting  Genitourinary: Negative for difficulty urinating, dysuria, frequency, hematuria, testicular pain and urgency  Musculoskeletal: Negative for back pain and neck pain  Skin: Negative for color change and rash  Neurological: Negative for syncope, numbness and headaches  All other systems reviewed and are negative        Physical Exam  ED Triage Vitals   Temperature Pulse Respirations Blood Pressure SpO2   12/01/18 0607 12/01/18 0607 12/01/18 0607 12/01/18 0607 12/01/18 0607   97 9 °F (36 6 °C) 99 18 133/77 95 %      Temp Source Heart Rate Source Patient Position - Orthostatic VS BP Location FiO2 (%)   12/01/18 0607 12/01/18 0607 12/01/18 0708 12/01/18 0607 --   Oral Monitor Sitting Right arm       Pain Score       12/01/18 0706       7           Orthostatic Vital Signs  Vitals:    12/01/18 0607 12/01/18 0708 12/01/18 0730   BP: 133/77 130/63 120/61   Pulse: 99 86 90   Patient Position - Orthostatic VS: Sitting Sitting       Physical Exam   Constitutional: He is oriented to person, place, and time  He appears well-developed and well-nourished  No distress  HENT:   Head: Normocephalic and atraumatic  Eyes: Conjunctivae and EOM are normal    Neck: Normal range of motion  Neck supple  Cardiovascular: Normal rate and regular rhythm  Pulmonary/Chest: Effort normal and breath sounds normal  No respiratory distress  He has no wheezes  He has no rales  Abdominal: Soft  Bowel sounds are normal  There is tenderness  There is no guarding  Diffuse tenderness most prominent in left lower quadrant   Genitourinary: Rectal exam shows guaiac positive stool  Genitourinary Comments: No pain on digital rectal exam, no areas of fluctuance  Blood noted around anus  Musculoskeletal: Normal range of motion  He exhibits no edema or tenderness  Neurological: He is alert and oriented to person, place, and time  No cranial nerve deficit  Skin: Skin is warm  He is not diaphoretic  No erythema  Psychiatric: He has a normal mood and affect  His behavior is normal    Nursing note and vitals reviewed        ED Medications  Medications   sodium chloride 0 9 % bolus 1,000 mL (0 mL Intravenous Stopped 12/1/18 0712)   ketorolac (TORADOL) injection 15 mg (15 mg Intravenous Given 12/1/18 0709)   morphine (PF) 10 mg/mL injection 8 mg (8 mg Intravenous Given 12/1/18 0706)   iohexol (OMNIPAQUE) 350 MG/ML injection (MULTI-DOSE) 100 mL (100 mL Intravenous Given 12/1/18 0730)   potassium chloride (K-DUR,KLOR-CON) CR tablet 40 mEq (40 mEq Oral Given 12/1/18 0807)       Diagnostic Studies  Results Reviewed     Procedure Component Value Units Date/Time    CBC and differential [[id number]]  (Abnormal) Collected:  12/01/18 9328    Lab Status:  Final result Specimen:  Blood from Arm, Left Updated:  12/01/18 0746     WBC 12 98 (H) Thousand/uL      RBC 3 88 Million/uL      Hemoglobin 12 0 g/dL      Hematocrit 35 2 (L) %      MCV 91 fL      MCH 30 9 pg      MCHC 34 1 g/dL      RDW 12 7 %      MPV 8 4 (L) fL      Platelets 424 (H) Thousands/uL      nRBC 0 /100 WBCs     Narrative: This is an appended report  These results have been appended to a previously verified report  Troponin I [[id number]]  (Normal) Collected:  12/01/18 4343    Lab Status:  Final result Specimen:  Blood from Arm, Left Updated:  12/01/18 0705     Troponin I <0 02 ng/mL     Comprehensive metabolic panel [[id number]]  (Abnormal) Collected:  12/01/18 0598    Lab Status:  Final result Specimen:  Blood from Arm, Left Updated:  12/01/18 3885     Sodium 132 (L) mmol/L      Potassium 3 1 (L) mmol/L      Chloride 99 (L) mmol/L      CO2 25 mmol/L      ANION GAP 8 mmol/L      BUN 12 mg/dL      Creatinine 1 00 mg/dL      Glucose 122 mg/dL      Calcium 8 7 mg/dL      AST 10 U/L      ALT 23 U/L      Alkaline Phosphatase 72 U/L      Total Protein 6 3 (L) g/dL      Albumin 2 3 (L) g/dL      Total Bilirubin 0 45 mg/dL      eGFR 85 ml/min/1 73sq m     Narrative:         National Kidney Disease Education Program recommendations are as follows:  GFR calculation is accurate only with a steady state creatinine  Chronic Kidney disease less than 60 ml/min/1 73 sq  meters  Kidney failure less than 15 ml/min/1 73 sq  meters  Protime-INR [[id number]]  (Abnormal) Collected:  12/01/18 1076    Lab Status:  Final result Specimen:  Blood from Arm, Left Updated:  12/01/18 0659     Protime 14 8 (H) seconds      INR 1 15    Lipase [[id number]]  (Abnormal) Collected:  12/01/18 3488    Lab Status:  Final result Specimen:  Blood from Arm, Left Updated:  12/01/18 0657     Lipase 51 (L) u/L                  XR chest 2 views   Final Result by Essie Bowman MD (12/01 4486)      No acute cardiopulmonary disease              Workstation performed: [id number]         CT abdomen pelvis w wo contrast   Final Result by Sheryl Boas, MD (12/01 9606)      Nonspecific diffuse inflammatory or infectious colitis extending from the hepatic flexure through the rectum  Ulcerative colitis is in the differential among other etiologies  No pneumatosis coli, bowel obstruction, abscess, or free gas  Minimal simple free fluid in the left paracolic gutter and pelvis  No active bowel or extraluminal hemorrhage  Nonspecific shotty left pelvic sidewall lymph nodes measuring up to 0 9 cm in short axis likely reactive kaelyn disease  This could be followed up with repeat CT in 3 months  Hepatic steatosis  Workstation performed: [id number]               Procedures  Procedures      Phone Consults  ED Phone Contact    ED Course                               MDM  Number of Diagnoses or Management Options  Diagnosis management comments: 53-year-old male presenting with bright red blood per rectum and abdominal pain with associated shortness of breath  Will obtain cardiac and abdominal labs, a type and screen, 2 large-bore IVs   Administer fluids and obtain CT abdomen pelvis  Pain control  CritCare Time    Disposition  Final diagnoses:   Diverticulitis   Blood in stool   Hypokalemia     Time reflects when diagnosis was documented in both MDM as applicable and the Disposition within this note     Time User Action Codes Description Comment    12/1/2018  7:59 AM Dede Moritz [K57 92] Diverticulitis     12/1/2018  7:59 AM Amos Hyatt Add [K92 1] Blood in stool     12/1/2018  8:00 AM Amos Bonds Add [E87 6] Hypokalemia       ED Disposition     ED Disposition Condition Comment    Discharge  Eather Ast discharge to home/self care      Condition at discharge: Good        Follow-up Information     Follow up With Specialties Details Why Contact Info Additional Information    Lola Dillard MD Family Medicine Schedule an appointment as soon as possible for a visit in 2 days For follow up regarding your symptoms [address] [facility] [id number]       [address] [place] [address] Department Emergency Medicine Go to If symptoms worsen [address]  [phone]  ED, [address] [place], [place], [address]          Discharge Medication List as of 12/1/2018  8:00 AM      CONTINUE these medications which have NOT CHANGED    Details   aspirin 81 MG tablet Take 81 mg by mouth daily  , Until Discontinued, Historical Med      ciprofloxacin (CIPRO) 500 mg tablet Take 1 tablet (500 mg total) by mouth every 12 (twelve) hours for 7 days, Starting Fri 11/30/2018, Until Fri 12/7/2018, Normal      losartan (COZAAR) 50 mg tablet Take 50 mg by mouth daily  , Starting Wed 11/21/2018, Historical Med      metroNIDAZOLE (FLAGYL) 500 mg tablet Take 1 tablet (500 mg total) by mouth every 8 (eight) hours for 10 days, Starting Fri 11/30/2018, Until Mon 12/10/2018, Normal      MULTIPLE VITAMIN PO Take by mouth , Until Discontinued, Historical Med      ondansetron (ZOFRAN-ODT) 4 mg disintegrating tablet Take 1 tablet (4 mg total) by mouth every 6 (six) hours as needed for nausea or vomiting, Starting Fri 11/30/2018, Normal           No discharge procedures on file  ED Provider  Attending physically available and evaluated Starlett Drilling  I managed the patient along with the ED Attending      Electronically Signed by         Ryan Ibarra DO  12/02/18 6893

## 2018-12-01 NOTE — DISCHARGE INSTRUCTIONS
Diverticulitis   WHAT YOU NEED TO KNOW:   Diverticulitis is a condition that causes small pockets along your intestine called diverticula to become inflamed or infected  This is caused by hard bowel movements, food, or bacteria that get stuck in the pockets  DISCHARGE INSTRUCTIONS:   Return to the emergency department if:   · You have bowel movement or foul-smelling discharge leaking from your vagina or in your urine  · You have severe diarrhea  · You urinate less than usual or not at all  · You are not able to have a bowel movement  · You cannot stop vomiting  · You have severe abdominal pain, a fever, and your abdomen is larger than usual      · You have new or increased blood in your bowel movements  Contact your healthcare provider if:   · You have pain when you urinate  · Your symptoms get worse or do not go away  · You have questions or concerns about your condition or care  Medicines:   · Antibiotics  may be given to help treat a bacterial infection  · Prescription pain medicine  may be given  Ask your healthcare provider how to take this medicine safely  Some prescription pain medicines contain acetaminophen  Do not take other medicines that contain acetaminophen without talking to your healthcare provider  Too much acetaminophen may cause liver damage  Prescription pain medicine may cause constipation  Ask your healthcare provider how to prevent or treat constipation  · Take your medicine as directed  Contact your healthcare provider if you think your medicine is not helping or if you have side effects  Tell him or her if you are allergic to any medicine  Keep a list of the medicines, vitamins, and herbs you take  Include the amounts, and when and why you take them  Bring the list or the pill bottles to follow-up visits  Carry your medicine list with you in case of an emergency  Clear liquid diet:  A clear liquid diet includes any liquids that you can see through  Examples include water, ginger-russell, cranberry or apple juice, frozen fruit ice, or broth  Stay on a clear liquid diet until your symptoms are gone, or as directed  Follow up with your healthcare provider as directed: You may need to return for a colonoscopy  When your symptoms are gone, you may need a low-fat, high-fiber diet to prevent diverticulitis from developing again  Your healthcare provider or dietitian can help you create meal plans  Write down your questions so you remember to ask them during your visits  © 2017 [facility] [employer] Information is for End User's use only and may not be sold, redistributed or otherwise used for commercial purposes  All illustrations and images included in CareNotes® are the copyrighted property of [employer] , [employer]  or Philipp [employer]  The above information is an  only  It is not intended as medical advice for individual conditions or treatments  Talk to your doctor, nurse or pharmacist before following any medical regimen to see if it is safe and effective for you

## 2018-12-01 NOTE — ED ATTENDING ATTESTATION
Evelyn Mcgraw MD, saw and evaluated the patient  All available labs and X-rays were ordered by me or the resident and have been reviewed by myself  I discussed the patient with the resident / non-physician and agree with the resident's / non-physician practitioner's findings and plan as documented in the resident's / non-physician practicitioner's note, except where noted  At this point, I agree with the current assessment done in the ED  Chief Complaint   Patient presents with    Rectal Bleeding     pt reports rectal bleeding for "weeks now"  REports going to PCP and being DX with diverticulitis and put on ABX  PT presenting today with continuing rectal bleeding that is bright red with purple clots per the patient's description     This is a 46year old male presenting for 4 days of BRBPR  He saw PCP who diagnosed diverticulitis, started on cipro/flagyl  He's had 1 days worth of bleeding of small amounts but it is getting worse gradaully  He finally told his wife about it 2 days ago  Yesterday, saw PCP b/c of the worsening pain + bleeding specifically  He was also told to go to ER if pain is worsening  The pain is maximal in the LLQ but involves entire abdomen  No hx of similar  For the past month he has had a little bit of pain but would resolve  Last 4 days though have been continuous  4-5 bowel movements of bright blood in toilet  Colonoscopy 2 years ago was normal    No fevers  +chills  No nausea/vomiting  Because of pain, feels short of breath sometimes  +ASA, no other blood thinners/anticoagulation  PMH:  - HTN  PSH:  - cardiac catherization  +smoking drinking  No drugs  PE:  Vitals:    12/01/18 0607 12/01/18 0708 12/01/18 0730   BP: 133/77 130/63 120/61   BP Location: Right arm Right arm Right arm   Pulse: 99 86 90   Resp: 18 18 18   Temp: 97 9 °F (36 6 °C)     TempSrc: Oral     SpO2: 95% 96% 94%   General: VSS, NAD, awake, alert  Well-nourished, well-developed  Appears stated age  Speaking normally in full sentences  Head: Normocephalic, atraumatic, nontender  Eyes: PERRL, EOM-I  No diplopia  No hyphema  No subconjunctival hemorrhages  Symmetrical lids  ENT: Atraumatic external nose and ears  MMM  No malocclusion  No stridor  Normal phonation  No drooling  Normal swallowing  Neck: Symmetric, trachea midline  No JVD  CV: RRR  +S1/S2  No murmurs or gallops  Peripheral pulses +2 throughout  No chest wall tenderness  Lungs:   Unlabored No retractions  CTAB, lungs sounds equal bilateral    No tachypnea  Abd: +BS, soft, LLQ tenderness focally  Rest of abdomen mildly tender  Per resident: no hemorrhoids, hemoccult positive, grossly positive for blood  ND   Heel strike negative  MSK:   FROM   Back:   No rashes  Skin: Dry, intact  Neuro: AAOx3, GCS 15, CN II-XII grossly intact  Motor grossly intact  Psychiatric/Behavioral: Appropriate mood and affect   Exam: deferred  A/P:  - CT IV contrast given abdominal pain  - Labs  - Pain control  - re-evaluate   - 13 point ROS was performed and all are normal unless stated in the history above  - Nursing note reviewed  Vitals reviewed  - Orders placed by myself and/or advanced practitioner / resident     - Previous chart was reviewed  - No language barrier    - History obtained from patient, wife  - There are no limitations to the history obtained  - Critical care time: Not applicable for this patient  Final Diagnosis:  1  Diverticulitis    2  Blood in stool    3   Hypokalemia        ED Course as of Dec 02 0558   Sat Dec 01, 2018   0702 Hemoglobin: 12 0   0702 Platelet Count: (!) 869   0702 WBC: (!) 12 98     Medications   sodium chloride 0 9 % bolus 1,000 mL (0 mL Intravenous Stopped 12/1/18 0712)   ketorolac (TORADOL) injection 15 mg (15 mg Intravenous Given 12/1/18 0709)   morphine (PF) 10 mg/mL injection 8 mg (8 mg Intravenous Given 12/1/18 0706)   iohexol (OMNIPAQUE) 350 MG/ML injection (MULTI-DOSE) 100 mL (100 mL Intravenous Given 12/1/18 0730)   potassium chloride (K-DUR,KLOR-CON) CR tablet 40 mEq (40 mEq Oral Given 12/1/18 0807)     XR chest 2 views   Final Result      No acute cardiopulmonary disease  Workstation performed: [id number]         CT abdomen pelvis w wo contrast   Final Result      Nonspecific diffuse inflammatory or infectious colitis extending from the hepatic flexure through the rectum  Ulcerative colitis is in the differential among other etiologies  No pneumatosis coli, bowel obstruction, abscess, or free gas  Minimal simple free fluid in the left paracolic gutter and pelvis  No active bowel or extraluminal hemorrhage  Nonspecific shotty left pelvic sidewall lymph nodes measuring up to 0 9 cm in short axis likely reactive kaelyn disease  This could be followed up with repeat CT in 3 months  Hepatic steatosis  Workstation performed: [id number]           Orders Placed This Encounter   Procedures    CT abdomen pelvis w wo contrast    XR chest 2 views    CBC and differential    Comprehensive metabolic panel    Protime-INR    Troponin I    Lipase    Continuous cardiac monitoring    ECG 12 lead    Type and screen     Labs Reviewed   CBC AND DIFFERENTIAL - Abnormal        Result Value Ref Range Status    WBC 12 98 (*) 4 31 - 10 16 Thousand/uL Final    RBC 3 88  3 88 - 5 62 Million/uL Final    Hemoglobin 12 0  12 0 - 17 0 g/dL Final    Hematocrit 35 2 (*) 36 5 - 49 3 % Final    MCV 91  82 - 98 fL Final    MCH 30 9  26 8 - 34 3 pg Final    MCHC 34 1  31 4 - 37 4 g/dL Final    RDW 12 7  11 6 - 15 1 % Final    MPV 8 4 (*) 8 9 - 12 7 fL Final    Platelets 378 (*) 410 - 390 Thousands/uL Final    nRBC 0  /100 WBCs Final    Comment: This is an appended report  These results have been appended to a previously preliminary verified report  Narrative: This is an appended report  These results have been appended to a previously verified report     COMPREHENSIVE METABOLIC PANEL - Abnormal     Sodium 132 (*) 136 - 145 mmol/L Final    Potassium 3 1 (*) 3 5 - 5 3 mmol/L Final    Chloride 99 (*) 100 - 108 mmol/L Final    CO2 25  21 - 32 mmol/L Final    ANION GAP 8  4 - 13 mmol/L Final    BUN 12  5 - 25 mg/dL Final    Creatinine 1 00  0 60 - 1 30 mg/dL Final    Comment: Standardized to IDMS reference method    Glucose 122  65 - 140 mg/dL Final    Comment:   If the patient is fasting, the ADA then defines impaired fasting glucose as > 100 mg/dL and diabetes as > or equal to 123 mg/dL  Specimen collection should occur prior to Sulfasalazine administration due to the potential for falsely depressed results  Specimen collection should occur prior to Sulfapyridine administration due to the potential for falsely elevated results  Calcium 8 7  8 3 - 10 1 mg/dL Final    AST 10  5 - 45 U/L Final    Comment:   Specimen collection should occur prior to Sulfasalazine administration due to the potential for falsely depressed results  ALT 23  12 - 78 U/L Final    Comment:   Specimen collection should occur prior to Sulfasalazine and/or Sulfapyridine administration due to the potential for falsely depressed results  Alkaline Phosphatase 72  46 - 116 U/L Final    Total Protein 6 3 (*) 6 4 - 8 2 g/dL Final    Albumin 2 3 (*) 3 5 - 5 0 g/dL Final    Total Bilirubin 0 45  0 20 - 1 00 mg/dL Final    eGFR 85  ml/min/1 73sq m Final    Narrative:     National Kidney Disease Education Program recommendations are as follows:  GFR calculation is accurate only with a steady state creatinine  Chronic Kidney disease less than 60 ml/min/1 73 sq  meters  Kidney failure less than 15 ml/min/1 73 sq  meters     PROTIME-INR - Abnormal     Protime 14 8 (*) 11 8 - 14 2 seconds Final    INR 1 15  0 86 - 1 17 Final   LIPASE - Abnormal     Lipase 51 (*) 73 - 393 u/L Final   MANUAL DIFFERENTIAL(PHLEBS DO NOT ORDER) - Abnormal     Segmented % 53  43 - 75 % Final    Bands % 12 (*) 0 - 8 % Final    Lymphocytes % 14  14 - 44 % Final    Monocytes % 12  4 - 12 % Final    Eosinophils, % 7 (*) 0 - 6 % Final    Basophils % 0  0 - 1 % Final    Metamyelocytes% 1  0 - 1 % Final    Atypical Lymphocytes % 1 (*) <=0 % Final    Absolute Neutrophils 8 44 (*) 1 85 - 7 62 Thousand/uL Final    Lymphocytes Absolute 1 82  0 60 - 4 47 Thousand/uL Final    Monocytes Absolute 1 56 (*) 0 00 - 1 22 Thousand/uL Final    Eosinophils Absolute 0 91 (*) 0 00 - 0 40 Thousand/uL Final    Basophils Absolute 0 00  0 00 - 0 10 Thousand/uL Final    Total Counted     Final    RBC Morphology Present   Final    Polychromasia Present   Final    Platelet Estimate Increased (*) Adequate Final   TROPONIN I - Normal    Troponin I <0 02  <=0 04 ng/mL Final    Comment:   [employer] Chemistry analyzer 99% cutoff is > 0 04 ng/mL in network labs     o cTnI 99% cutoff is useful only when applied to patients in the clinical setting of myocardial ischemia   o cTnI 99% cutoff should be interpreted in the context of clinical history, ECG findings and possibly cardiac imaging to establish correct diagnosis  o cTnI 99% cutoff may be suggestive but clearly not indicative of a coronary event without the clinical setting of myocardial ischemia  TYPE AND SCREEN    ABO Grouping O   Final    Rh Factor Positive   Final    Antibody Screen Negative   Final    Specimen Expiration Date [id number]   Final     Time reflects when diagnosis was documented in both MDM as applicable and the Disposition within this note     Time User Action Codes Description Comment    12/1/2018  7:59 AM Luisa Diaz [K57 92] Diverticulitis     12/1/2018  7:59 AM Virgil Nick [K92 1] Blood in stool     12/1/2018  8:00 AM Virgil Nick [E87 6] Hypokalemia       ED Disposition     ED Disposition Condition Comment    Discharge  Gerald Snow discharge to home/self care      Condition at discharge: Good        Follow-up Information     Follow up With Specialties Details Why Contact Info Additional Information    Emmett Ambrosio MD Family Medicine Schedule an appointment as soon as possible for a visit in 2 days For follow up regarding your symptoms [address] [phone]       [address] [place] [address] Emergency Department Emergency Medicine Go to If symptoms worsen [address] [facility] [facility] ED, [address], [address]        Discharge Medication List as of 12/1/2018  8:00 AM      CONTINUE these medications which have NOT CHANGED    Details   aspirin 81 MG tablet Take 81 mg by mouth daily  , Until Discontinued, Historical Med      ciprofloxacin (CIPRO) 500 mg tablet Take 1 tablet (500 mg total) by mouth every 12 (twelve) hours for 7 days, Starting Fri 11/30/2018, Until Fri 12/7/2018, Normal      losartan (COZAAR) 50 mg tablet Take 50 mg by mouth daily  , Starting Wed 11/21/2018, Historical Med      metroNIDAZOLE (FLAGYL) 500 mg tablet Take 1 tablet (500 mg total) by mouth every 8 (eight) hours for 10 days, Starting Fri 11/30/2018, Until Mon 12/10/2018, Normal      MULTIPLE VITAMIN PO Take by mouth , Until Discontinued, Historical Med      ondansetron (ZOFRAN-ODT) 4 mg disintegrating tablet Take 1 tablet (4 mg total) by mouth every 6 (six) hours as needed for nausea or vomiting, Starting Fri 11/30/2018, Normal           No discharge procedures on file  Prior to Admission Medications   Prescriptions Last Dose Informant Patient Reported? Taking? MULTIPLE VITAMIN PO   Yes Yes   Sig: Take by mouth  aspirin 81 MG tablet   Yes Yes   Sig: Take 81 mg by mouth daily     ciprofloxacin (CIPRO) 500 mg tablet   No Yes   Sig: Take 1 tablet (500 mg total) by mouth every 12 (twelve) hours for 7 days   losartan (COZAAR) 50 mg tablet   Yes Yes   Sig: Take 50 mg by mouth daily     metroNIDAZOLE (FLAGYL) 500 mg tablet   No Yes   Sig: Take 1 tablet (500 mg total) by mouth every 8 (eight) hours for 10 days   ondansetron (ZOFRAN-ODT) 4 mg disintegrating tablet   No Yes   Sig: Take 1 tablet (4 mg total) by mouth every 6 (six) hours as needed for nausea or vomiting      Facility-Administered Medications: None       Portions of the record may have been created with voice recognition software  Occasional wrong word or "sound a like" substitutions may have occurred due to the inherent limitations of voice recognition software  Read the chart carefully and recognize, using context, where substitutions have occurred      Electronically signed by:  Xiomara Key

## 2018-12-02 ENCOUNTER — HOSPITAL ENCOUNTER (INPATIENT)
Facility: HOSPITAL | Age: 53
LOS: 6 days | Discharge: HOME | DRG: 394 | End: 2018-12-09
Attending: EMERGENCY MEDICINE | Admitting: SURGERY
Payer: COMMERCIAL

## 2018-12-02 ENCOUNTER — APPOINTMENT (EMERGENCY)
Dept: RADIOLOGY | Facility: HOSPITAL | Age: 53
DRG: 394 | End: 2018-12-02
Payer: COMMERCIAL

## 2018-12-02 DIAGNOSIS — K62.5 COLITIS WITH RECTAL BLEEDING: ICD-10-CM

## 2018-12-02 DIAGNOSIS — R14.0 ABDOMINAL DISTENSION: ICD-10-CM

## 2018-12-02 DIAGNOSIS — K52.9 COLITIS WITH RECTAL BLEEDING: ICD-10-CM

## 2018-12-02 DIAGNOSIS — K52.9 COLITIS: ICD-10-CM

## 2018-12-02 DIAGNOSIS — K57.92 DIVERTICULITIS: Primary | ICD-10-CM

## 2018-12-02 DIAGNOSIS — D72.825 BANDEMIA: ICD-10-CM

## 2018-12-02 PROCEDURE — 96375 TX/PRO/DX INJ NEW DRUG ADDON: CPT

## 2018-12-02 PROCEDURE — 99285 EMERGENCY DEPT VISIT HI MDM: CPT

## 2018-12-02 PROCEDURE — 74018 RADEX ABDOMEN 1 VIEW: CPT

## 2018-12-02 RX ORDER — HYDROMORPHONE HCL/PF 1 MG/ML
0.5 SYRINGE (ML) INJECTION ONCE
Status: COMPLETED | OUTPATIENT
Start: 2018-12-02 | End: 2018-12-02

## 2018-12-02 RX ORDER — KETAMINE HYDROCHLORIDE 50 MG/ML
50 INJECTION, SOLUTION, CONCENTRATE INTRAMUSCULAR; INTRAVENOUS ONCE
Status: COMPLETED | OUTPATIENT
Start: 2018-12-02 | End: 2018-12-02

## 2018-12-02 RX ORDER — CIPROFLOXACIN 2 MG/ML
400 INJECTION, SOLUTION INTRAVENOUS ONCE
Status: COMPLETED | OUTPATIENT
Start: 2018-12-02 | End: 2018-12-03

## 2018-12-02 RX ADMIN — KETAMINE HYDROCHLORIDE 50 MG: 50 INJECTION INTRAMUSCULAR; INTRAVENOUS at 23:21

## 2018-12-02 RX ADMIN — TOPICAL ANESTHETIC 2 SPRAY: 200 SPRAY DENTAL; PERIODONTAL at 23:33

## 2018-12-02 RX ADMIN — HYDROMORPHONE HYDROCHLORIDE 0.5 MG: 1 INJECTION, SOLUTION INTRAMUSCULAR; INTRAVENOUS; SUBCUTANEOUS at 22:26

## 2018-12-02 NOTE — LETTER
[address] [place] [id number]  Dept: [phone]    December 9, 2018     Patient: Ryan Goddard   YOB: 1965   Date of Visit: 12/2/2018       To Whom it May Concern:    Samantha Benitez is under my professional care  He was seen in the hospital from 12/2/2018   to 12/09/18  He may return to work on 12/17/2018 full dity    If you have any questions or concerns, please don't hesitate to call           Sincerely,          Tj Quintanilla PA-C

## 2018-12-03 ENCOUNTER — APPOINTMENT (INPATIENT)
Dept: RADIOLOGY | Facility: HOSPITAL | Age: 53
DRG: 394 | End: 2018-12-03
Payer: COMMERCIAL

## 2018-12-03 ENCOUNTER — APPOINTMENT (EMERGENCY)
Dept: RADIOLOGY | Facility: HOSPITAL | Age: 53
DRG: 394 | End: 2018-12-03
Payer: COMMERCIAL

## 2018-12-03 ENCOUNTER — APPOINTMENT (EMERGENCY)
Dept: CT IMAGING | Facility: HOSPITAL | Age: 53
DRG: 394 | End: 2018-12-03
Payer: COMMERCIAL

## 2018-12-03 PROBLEM — K52.9 COLITIS WITH RECTAL BLEEDING: Status: ACTIVE | Noted: 2018-12-03

## 2018-12-03 PROBLEM — K62.5 COLITIS WITH RECTAL BLEEDING: Status: ACTIVE | Noted: 2018-12-03

## 2018-12-03 PROBLEM — E87.6 HYPOKALEMIA: Status: ACTIVE | Noted: 2018-12-03

## 2018-12-03 PROBLEM — R10.84 GENERALIZED ABDOMINAL PAIN: Status: ACTIVE | Noted: 2018-12-03

## 2018-12-03 LAB
ALBUMIN SERPL BCP-MCNC: 2.1 G/DL (ref 3.5–5)
ALP SERPL-CCNC: 70 U/L (ref 46–116)
ALT SERPL W P-5'-P-CCNC: 26 U/L (ref 12–78)
ANION GAP SERPL CALCULATED.3IONS-SCNC: 8 MMOL/L (ref 4–13)
ANION GAP SERPL CALCULATED.3IONS-SCNC: 9 MMOL/L (ref 4–13)
AST SERPL W P-5'-P-CCNC: 14 U/L (ref 5–45)
ATRIAL RATE: 79 BPM
ATRIAL RATE: 80 BPM
BASOPHILS # BLD MANUAL: 0 THOUSAND/UL (ref 0–0.1)
BASOPHILS # BLD MANUAL: 0 THOUSAND/UL (ref 0–0.1)
BASOPHILS NFR MAR MANUAL: 0 % (ref 0–1)
BASOPHILS NFR MAR MANUAL: 0 % (ref 0–1)
BILIRUB SERPL-MCNC: 0.4 MG/DL (ref 0.2–1)
BUN SERPL-MCNC: 13 MG/DL (ref 5–25)
BUN SERPL-MCNC: 14 MG/DL (ref 5–25)
C DIFF TOX GENS STL QL NAA+PROBE: NORMAL
CALCIUM SERPL-MCNC: 7.7 MG/DL (ref 8.3–10.1)
CALCIUM SERPL-MCNC: 8.1 MG/DL (ref 8.3–10.1)
CHLORIDE SERPL-SCNC: 102 MMOL/L (ref 100–108)
CHLORIDE SERPL-SCNC: 98 MMOL/L (ref 100–108)
CK SERPL-CCNC: 52 U/L (ref 39–308)
CO2 SERPL-SCNC: 27 MMOL/L (ref 21–32)
CO2 SERPL-SCNC: 28 MMOL/L (ref 21–32)
CREAT SERPL-MCNC: 0.87 MG/DL (ref 0.6–1.3)
CREAT SERPL-MCNC: 0.98 MG/DL (ref 0.6–1.3)
CRP SERPL QL: >90 MG/L
DACRYOCYTES BLD QL SMEAR: PRESENT
DOHLE BOD BLD QL SMEAR: PRESENT
EOSINOPHIL # BLD MANUAL: 0.11 THOUSAND/UL (ref 0–0.4)
EOSINOPHIL # BLD MANUAL: 0.24 THOUSAND/UL (ref 0–0.4)
EOSINOPHIL NFR BLD MANUAL: 1 % (ref 0–6)
EOSINOPHIL NFR BLD MANUAL: 2 % (ref 0–6)
ERYTHROCYTE [DISTWIDTH] IN BLOOD BY AUTOMATED COUNT: 12.8 % (ref 11.6–15.1)
ERYTHROCYTE [DISTWIDTH] IN BLOOD BY AUTOMATED COUNT: 13 % (ref 11.6–15.1)
ERYTHROCYTE [SEDIMENTATION RATE] IN BLOOD: 38 MM/HOUR (ref 0–10)
GFR SERPL CREATININE-BSD FRML MDRD: 87 ML/MIN/1.73SQ M
GFR SERPL CREATININE-BSD FRML MDRD: 98 ML/MIN/1.73SQ M
GLUCOSE SERPL-MCNC: 125 MG/DL (ref 65–140)
GLUCOSE SERPL-MCNC: 126 MG/DL (ref 65–140)
HCT VFR BLD AUTO: 32.4 % (ref 36.5–49.3)
HCT VFR BLD AUTO: 34.7 % (ref 36.5–49.3)
HGB BLD-MCNC: 11 G/DL (ref 12–17)
HGB BLD-MCNC: 11.5 G/DL (ref 12–17)
LACTATE SERPL-SCNC: 1.1 MMOL/L (ref 0.5–2)
LIPASE SERPL-CCNC: 36 U/L (ref 73–393)
LYMPHOCYTES # BLD AUTO: 1.44 THOUSAND/UL (ref 0.6–4.47)
LYMPHOCYTES # BLD AUTO: 1.73 THOUSAND/UL (ref 0.6–4.47)
LYMPHOCYTES # BLD AUTO: 12 % (ref 14–44)
LYMPHOCYTES # BLD AUTO: 16 % (ref 14–44)
MAGNESIUM SERPL-MCNC: 1.6 MG/DL (ref 1.6–2.6)
MAGNESIUM SERPL-MCNC: 1.8 MG/DL (ref 1.6–2.6)
MCH RBC QN AUTO: 30.4 PG (ref 26.8–34.3)
MCH RBC QN AUTO: 30.6 PG (ref 26.8–34.3)
MCHC RBC AUTO-ENTMCNC: 33.1 G/DL (ref 31.4–37.4)
MCHC RBC AUTO-ENTMCNC: 34 G/DL (ref 31.4–37.4)
MCV RBC AUTO: 90 FL (ref 82–98)
MCV RBC AUTO: 92 FL (ref 82–98)
MONOCYTES # BLD AUTO: 1.83 THOUSAND/UL (ref 0–1.22)
MONOCYTES # BLD AUTO: 2.16 THOUSAND/UL (ref 0–1.22)
MONOCYTES NFR BLD: 17 % (ref 4–12)
MONOCYTES NFR BLD: 18 % (ref 4–12)
NEUTROPHILS # BLD MANUAL: 7.12 THOUSAND/UL (ref 1.85–7.62)
NEUTROPHILS # BLD MANUAL: 8.04 THOUSAND/UL (ref 1.85–7.62)
NEUTS BAND NFR BLD MANUAL: 19 % (ref 0–8)
NEUTS BAND NFR BLD MANUAL: 5 % (ref 0–8)
NEUTS SEG NFR BLD AUTO: 48 % (ref 43–75)
NEUTS SEG NFR BLD AUTO: 61 % (ref 43–75)
NRBC BLD AUTO-RTO: 0 /100 WBCS
NRBC BLD AUTO-RTO: 0 /100 WBCS
P AXIS: 25 DEGREES
P AXIS: 48 DEGREES
PHOSPHATE SERPL-MCNC: 2.3 MG/DL (ref 2.7–4.5)
PLATELET # BLD AUTO: 475 THOUSANDS/UL (ref 149–390)
PLATELET # BLD AUTO: 490 THOUSANDS/UL (ref 149–390)
PLATELET # BLD AUTO: 558 THOUSANDS/UL (ref 149–390)
PLATELET BLD QL SMEAR: ABNORMAL
PLATELET BLD QL SMEAR: ABNORMAL
PMV BLD AUTO: 8 FL (ref 8.9–12.7)
PMV BLD AUTO: 8 FL (ref 8.9–12.7)
PMV BLD AUTO: 8.7 FL (ref 8.9–12.7)
POLYCHROMASIA BLD QL SMEAR: PRESENT
POLYCHROMASIA BLD QL SMEAR: PRESENT
POTASSIUM SERPL-SCNC: 3.3 MMOL/L (ref 3.5–5.3)
POTASSIUM SERPL-SCNC: 3.5 MMOL/L (ref 3.5–5.3)
PR INTERVAL: 118 MS
PR INTERVAL: 126 MS
PROT SERPL-MCNC: 5.8 G/DL (ref 6.4–8.2)
QRS AXIS: 35 DEGREES
QRS AXIS: 72 DEGREES
QRSD INTERVAL: 104 MS
QRSD INTERVAL: 96 MS
QT INTERVAL: 348 MS
QT INTERVAL: 372 MS
QTC INTERVAL: 401 MS
QTC INTERVAL: 426 MS
RBC # BLD AUTO: 3.59 MILLION/UL (ref 3.88–5.62)
RBC # BLD AUTO: 3.78 MILLION/UL (ref 3.88–5.62)
SODIUM SERPL-SCNC: 135 MMOL/L (ref 136–145)
SODIUM SERPL-SCNC: 137 MMOL/L (ref 136–145)
T WAVE AXIS: 57 DEGREES
T WAVE AXIS: 76 DEGREES
TOTAL CELLS COUNTED SPEC: 100
TOTAL CELLS COUNTED SPEC: 100
TOXIC GRANULES BLD QL SMEAR: PRESENT
VARIANT LYMPHS # BLD AUTO: 1 %
VENTRICULAR RATE: 79 BPM
VENTRICULAR RATE: 80 BPM
WBC # BLD AUTO: 10.79 THOUSAND/UL (ref 4.31–10.16)
WBC # BLD AUTO: 12 THOUSAND/UL (ref 4.31–10.16)

## 2018-12-03 PROCEDURE — 84100 ASSAY OF PHOSPHORUS: CPT | Performed by: PHYSICIAN ASSISTANT

## 2018-12-03 PROCEDURE — 83605 ASSAY OF LACTIC ACID: CPT | Performed by: PHYSICIAN ASSISTANT

## 2018-12-03 PROCEDURE — 74177 CT ABD & PELVIS W/CONTRAST: CPT

## 2018-12-03 PROCEDURE — 90682 RIV4 VACC RECOMBINANT DNA IM: CPT | Performed by: SURGERY

## 2018-12-03 PROCEDURE — 80048 BASIC METABOLIC PNL TOTAL CA: CPT | Performed by: SURGERY

## 2018-12-03 PROCEDURE — 36415 COLL VENOUS BLD VENIPUNCTURE: CPT | Performed by: PHYSICIAN ASSISTANT

## 2018-12-03 PROCEDURE — 94762 N-INVAS EAR/PLS OXIMTRY CONT: CPT

## 2018-12-03 PROCEDURE — 85652 RBC SED RATE AUTOMATED: CPT | Performed by: PHYSICIAN ASSISTANT

## 2018-12-03 PROCEDURE — 86140 C-REACTIVE PROTEIN: CPT | Performed by: PHYSICIAN ASSISTANT

## 2018-12-03 PROCEDURE — 87493 C DIFF AMPLIFIED PROBE: CPT | Performed by: SURGERY

## 2018-12-03 PROCEDURE — 85007 BL SMEAR W/DIFF WBC COUNT: CPT | Performed by: PHYSICIAN ASSISTANT

## 2018-12-03 PROCEDURE — 85007 BL SMEAR W/DIFF WBC COUNT: CPT | Performed by: SURGERY

## 2018-12-03 PROCEDURE — 82550 ASSAY OF CK (CPK): CPT | Performed by: PHYSICIAN ASSISTANT

## 2018-12-03 PROCEDURE — 85027 COMPLETE CBC AUTOMATED: CPT | Performed by: PHYSICIAN ASSISTANT

## 2018-12-03 PROCEDURE — 80053 COMPREHEN METABOLIC PANEL: CPT | Performed by: PHYSICIAN ASSISTANT

## 2018-12-03 PROCEDURE — 74022 RADEX COMPL AQT ABD SERIES: CPT

## 2018-12-03 PROCEDURE — 96367 TX/PROPH/DG ADDL SEQ IV INF: CPT

## 2018-12-03 PROCEDURE — 83993 ASSAY FOR CALPROTECTIN FECAL: CPT | Performed by: PHYSICIAN ASSISTANT

## 2018-12-03 PROCEDURE — 94760 N-INVAS EAR/PLS OXIMETRY 1: CPT

## 2018-12-03 PROCEDURE — 83735 ASSAY OF MAGNESIUM: CPT | Performed by: PHYSICIAN ASSISTANT

## 2018-12-03 PROCEDURE — 93010 ELECTROCARDIOGRAM REPORT: CPT | Performed by: INTERNAL MEDICINE

## 2018-12-03 PROCEDURE — 87505 NFCT AGENT DETECTION GI: CPT | Performed by: SURGERY

## 2018-12-03 PROCEDURE — 96365 THER/PROPH/DIAG IV INF INIT: CPT

## 2018-12-03 PROCEDURE — 85049 AUTOMATED PLATELET COUNT: CPT | Performed by: SURGERY

## 2018-12-03 PROCEDURE — 83690 ASSAY OF LIPASE: CPT | Performed by: PHYSICIAN ASSISTANT

## 2018-12-03 PROCEDURE — 71046 X-RAY EXAM CHEST 2 VIEWS: CPT

## 2018-12-03 PROCEDURE — 93005 ELECTROCARDIOGRAM TRACING: CPT

## 2018-12-03 PROCEDURE — 85027 COMPLETE CBC AUTOMATED: CPT | Performed by: SURGERY

## 2018-12-03 PROCEDURE — 99254 IP/OBS CNSLTJ NEW/EST MOD 60: CPT | Performed by: INTERNAL MEDICINE

## 2018-12-03 PROCEDURE — 83735 ASSAY OF MAGNESIUM: CPT | Performed by: SURGERY

## 2018-12-03 RX ORDER — OXYCODONE HYDROCHLORIDE AND ACETAMINOPHEN 5; 325 MG/1; MG/1
1 TABLET ORAL EVERY 4 HOURS PRN
Status: DISCONTINUED | OUTPATIENT
Start: 2018-12-03 | End: 2018-12-09 | Stop reason: HOSPADM

## 2018-12-03 RX ORDER — ONDANSETRON 2 MG/ML
4 INJECTION INTRAMUSCULAR; INTRAVENOUS EVERY 4 HOURS PRN
Status: DISCONTINUED | OUTPATIENT
Start: 2018-12-03 | End: 2018-12-09 | Stop reason: HOSPADM

## 2018-12-03 RX ORDER — ACETAMINOPHEN 325 MG/1
650 TABLET ORAL EVERY 6 HOURS PRN
Status: DISCONTINUED | OUTPATIENT
Start: 2018-12-03 | End: 2018-12-09 | Stop reason: HOSPADM

## 2018-12-03 RX ORDER — LOSARTAN POTASSIUM 50 MG/1
50 TABLET ORAL DAILY
Status: DISCONTINUED | OUTPATIENT
Start: 2018-12-03 | End: 2018-12-09 | Stop reason: HOSPADM

## 2018-12-03 RX ORDER — DEXTROSE, SODIUM CHLORIDE, AND POTASSIUM CHLORIDE 5; .9; .15 G/100ML; G/100ML; G/100ML
50 INJECTION INTRAVENOUS CONTINUOUS
Status: DISCONTINUED | OUTPATIENT
Start: 2018-12-03 | End: 2018-12-09 | Stop reason: HOSPADM

## 2018-12-03 RX ORDER — SODIUM CHLORIDE 9 MG/ML
125 INJECTION, SOLUTION INTRAVENOUS CONTINUOUS
Status: DISCONTINUED | OUTPATIENT
Start: 2018-12-03 | End: 2018-12-03

## 2018-12-03 RX ORDER — DOCUSATE SODIUM 100 MG/1
100 CAPSULE, LIQUID FILLED ORAL 2 TIMES DAILY PRN
Status: DISCONTINUED | OUTPATIENT
Start: 2018-12-03 | End: 2018-12-09 | Stop reason: HOSPADM

## 2018-12-03 RX ORDER — HYDROMORPHONE HCL/PF 1 MG/ML
0.5 SYRINGE (ML) INJECTION EVERY 4 HOURS PRN
Status: DISCONTINUED | OUTPATIENT
Start: 2018-12-03 | End: 2018-12-09 | Stop reason: HOSPADM

## 2018-12-03 RX ORDER — POTASSIUM CHLORIDE 750 MG/1
10 TABLET, EXTENDED RELEASE ORAL 2 TIMES DAILY
Status: DISCONTINUED | OUTPATIENT
Start: 2018-12-03 | End: 2018-12-09 | Stop reason: HOSPADM

## 2018-12-03 RX ADMIN — POTASSIUM CHLORIDE, DEXTROSE MONOHYDRATE AND SODIUM CHLORIDE 125 ML/HR: 150; 5; 900 INJECTION, SOLUTION INTRAVENOUS at 12:40

## 2018-12-03 RX ADMIN — SODIUM CHLORIDE 125 ML/HR: 0.9 INJECTION, SOLUTION INTRAVENOUS at 04:12

## 2018-12-03 RX ADMIN — OXYCODONE HYDROCHLORIDE AND ACETAMINOPHEN 1 TABLET: 5; 325 TABLET ORAL at 18:20

## 2018-12-03 RX ADMIN — SODIUM CHLORIDE 3 G: 9 INJECTION, SOLUTION INTRAVENOUS at 11:13

## 2018-12-03 RX ADMIN — POTASSIUM CHLORIDE, DEXTROSE MONOHYDRATE AND SODIUM CHLORIDE 125 ML/HR: 150; 5; 900 INJECTION, SOLUTION INTRAVENOUS at 22:41

## 2018-12-03 RX ADMIN — HYDROMORPHONE HYDROCHLORIDE 0.5 MG: 1 INJECTION, SOLUTION INTRAMUSCULAR; INTRAVENOUS; SUBCUTANEOUS at 04:16

## 2018-12-03 RX ADMIN — POTASSIUM CHLORIDE 10 MEQ: 750 TABLET, EXTENDED RELEASE ORAL at 18:20

## 2018-12-03 RX ADMIN — CIPROFLOXACIN 400 MG: 2 INJECTION, SOLUTION INTRAVENOUS at 01:22

## 2018-12-03 RX ADMIN — SODIUM CHLORIDE 3 G: 9 INJECTION, SOLUTION INTRAVENOUS at 18:20

## 2018-12-03 RX ADMIN — HYDROMORPHONE HYDROCHLORIDE 0.5 MG: 1 INJECTION, SOLUTION INTRAMUSCULAR; INTRAVENOUS; SUBCUTANEOUS at 11:17

## 2018-12-03 RX ADMIN — METRONIDAZOLE 500 MG: 500 INJECTION, SOLUTION INTRAVENOUS at 00:27

## 2018-12-03 RX ADMIN — ENOXAPARIN SODIUM 40 MG: 40 INJECTION SUBCUTANEOUS at 08:13

## 2018-12-03 RX ADMIN — SODIUM CHLORIDE 3 G: 9 INJECTION, SOLUTION INTRAVENOUS at 04:13

## 2018-12-03 RX ADMIN — METRONIDAZOLE 500 MG: 500 INJECTION, SOLUTION INTRAVENOUS at 23:26

## 2018-12-03 RX ADMIN — METRONIDAZOLE 500 MG: 500 INJECTION, SOLUTION INTRAVENOUS at 07:38

## 2018-12-03 RX ADMIN — SODIUM CHLORIDE 3 G: 9 INJECTION, SOLUTION INTRAVENOUS at 22:42

## 2018-12-03 RX ADMIN — IOHEXOL 100 ML: 350 INJECTION, SOLUTION INTRAVENOUS at 02:07

## 2018-12-03 RX ADMIN — HYDROMORPHONE HYDROCHLORIDE 0.5 MG: 1 INJECTION, SOLUTION INTRAMUSCULAR; INTRAVENOUS; SUBCUTANEOUS at 15:36

## 2018-12-03 RX ADMIN — METRONIDAZOLE 500 MG: 500 INJECTION, SOLUTION INTRAVENOUS at 16:48

## 2018-12-03 RX ADMIN — INFLUENZA A VIRUS A/MICHIGAN/45/2015 (H1N1) RECOMBINANT HEMAGGLUTININ ANTIGEN, INFLUENZA A VIRUS A/SINGAPORE/INFIMH-16-0019/2016 (H3N2) RECOMBINANT HEMAGGLUTININ ANTIGEN, INFLUENZA B VIRUS B/MARYLAND/15/2016 RECOMBINANT HEMAGGLUTININ ANTIGEN, AND INFLUENZA B VIRUS B/PHUKET/3073/2013 RECOMBINANT HEMAGGLUTININ ANTIGEN 0.5 ML: 45; 45; 45; 45 INJECTION INTRAMUSCULAR at 23:25

## 2018-12-03 NOTE — ED PROVIDER NOTES
History  Chief Complaint   Patient presents with    Abdominal Pain     Pt  with complaints of abdominal pain and rectal bleeding for a few days, decreased appetite  Was diagnosed yesterday with diverticulitis at [facility], given po abx  States unable to eat or drink and severe pain  The patient is a 58-year-old male, seen with his wife, who assisted with providing both acute and chronic medical history  Patient has a notable history of diverticulitis, which was diagnosed at [facility] yesterday, during the day  The patient was seen at [facility] due to left lower quadrant abdominal pain, which the patient states became acutely worse, immediately prior to his arrival at [facility]  The patient, in addition to this, was having moderate rectal bleeding, for which the patient states was primary concern, upon arrival   The patient further states that, since this began, the pain initially had gotten much better, but, after ear at home, has abdominal distention became notably worse, and the patient's abdominal pain, which described as sharp and shooting, notably exacerbated  The patient states that, currently, his abdominal pain is worse with moving, coughing, eating, drinking, or any direct pressure to his abdomen  Furthermore, the patient's wife states that his the abdomen is notably more distended than it was previously, with the patient's wife stating that his abdomen is easily twice as large normally is  Prior to Admission Medications   Prescriptions Last Dose Informant Patient Reported? Taking? MULTIPLE VITAMIN PO 12/1/2018 at Unknown time  Yes Yes   Sig: Take by mouth  aspirin 81 MG tablet Past Week at Unknown time  Yes Yes   Sig: Take 81 mg by mouth daily     ciprofloxacin (CIPRO) 500 mg tablet 12/1/2018 at Unknown time  No Yes   Sig: Take 1 tablet (500 mg total) by mouth every 12 (twelve) hours for 7 days   losartan (COZAAR) 50 mg tablet 12/1/2018 at Unknown time Yes Yes   Sig: Take 50 mg by mouth daily     metroNIDAZOLE (FLAGYL) 500 mg tablet 12/1/2018 at Unknown time  No Yes   Sig: Take 1 tablet (500 mg total) by mouth every 8 (eight) hours for 10 days   ondansetron (ZOFRAN-ODT) 4 mg disintegrating tablet 12/1/2018 at Unknown time  No Yes   Sig: Take 1 tablet (4 mg total) by mouth every 6 (six) hours as needed for nausea or vomiting   potassium chloride (K-DUR,KLOR-CON) 10 mEq tablet 12/1/2018 at Unknown time  No Yes   Sig: Take 1 tablet (10 mEq total) by mouth 2 (two) times a day      Facility-Administered Medications: None       Past Medical History:   Diagnosis Date    Hypertension        Past Surgical History:   Procedure Laterality Date    CARDIAC CATHETERIZATION      CA COLONOSCOPY FLX DX W/COLLJ SPEC WHEN PFRMD N/A 4/25/2016    Procedure: COLONOSCOPY;  Surgeon: Fariba Chapa MD;  Location: [facility]; Service: Gastroenterology       Family History   Problem Relation Age of Onset    Diabetes Mother     Heart disease Father         Cardiac Disorder    Heart disease Brother         Cardiac Disorder     I have reviewed and agree with the history as documented  Social History   Substance Use Topics    Smoking status: Former Smoker    Smokeless tobacco: Current User    Alcohol use Yes      Comment: social        Review of Systems  Review of Systems: The Patient/Parent Denies the following: Negative, Except as noted in HPI  Physical Exam  Physical Exam  General: 46 y o  male patient, who appears their stated age, in moderate distress  Skin: No rashes, masses, or lesions noted  HEENT: Atraumatic & Normocephalic  External ears normal, with no noted abnormalities or deformities  Bilateral canals examined, without noted edema or discomfort  No pain while pulling the tragus  TM well visualized bilaterally, with no noted obstruction, effusion, erythema, or air fluid levels  No noted enlargement of the mastoid processes bilaterally   EOMI, PERRL, Conjunctiva without injection bilaterally  No conjunctival drainage noted bilaterally  Nares patent bilaterally, with no noted obstructions, erythema, or drainage  No noted rhinorrhea  Pharynx well visualized, with no exudate noted in the posterior pharynx  Neck: Soft, supple, and non-tender  No enlargement of the anterior cervical, posterior cervical, or occipital lymph notes  Cardiac: Regular rate and rhythm, with no noted murmurs, rubs, or gallops  Pulmonary: Normal Appearance  Clear to ascultation, with no noted rales, rhonchi, or wheezes     Abdomen: Abnormal inspection of the abdomen: Notably distended, Abnormal percussion to palpation: Diffusely tympanic, Normal Bowel Sounds, Positive pain with light palpation: Diffusely, Positive pain with deep palpation: Diffusely, Positive Cough Test (Indicating possible acute abdomen or peritonitis), Positive Heel tap test (Indicating possible acute abdomen or peritonitis), Negative Referred Rebound Tenderness, Positive Referred Rebound Tenderness: Left upper quadrant, Negative Rovsig Sign, Negative Psoas Sign, Negative Obturator Sign, Negative Longoria Sign, Negative CVA Tenderness Bilaterally            Vital Signs  ED Triage Vitals [12/02/18 1952]   Temperature Pulse Respirations Blood Pressure SpO2   98 4 °F (36 9 °C) 94 16 146/79 97 %      Temp Source Heart Rate Source Patient Position - Orthostatic VS BP Location FiO2 (%)   Oral Monitor Sitting Left arm --      Pain Score       Worst Possible Pain           Vitals:    12/03/18 0129 12/03/18 0311 12/03/18 0345 12/03/18 0520   BP: 117/72 116/76 112/60    Pulse: 83 82 83 86   Patient Position - Orthostatic VS: Lying Lying Lying        Visual Acuity      ED Medications  Medications   sodium chloride 0 9 % infusion (125 mL/hr Intravenous New Bag 12/3/18 1312)   docusate sodium (COLACE) capsule 100 mg (not administered)   nicotine (NICODERM CQ) 7 mg/24hr TD 24 hr patch 1 patch (not administered)   enoxaparin (LOVENOX) subcutaneous injection 40 mg (not administered)   ampicillin-sulbactam (UNASYN) 3 g in sodium chloride 0 9 % 100 mL IVPB (3 g Intravenous New Bag 12/3/18 0413)   metroNIDAZOLE (FLAGYL) IVPB (premix) 500 mg (not administered)   acetaminophen (TYLENOL) tablet 650 mg (not administered)   oxyCODONE-acetaminophen (PERCOCET) 5-325 mg per tablet 1 tablet (not administered)   HYDROmorphone (DILAUDID) injection 0 5 mg (0 5 mg Intravenous Given 12/3/18 0416)   ondansetron (ZOFRAN) injection 4 mg (not administered)   influenza vaccine, recombinant, quadrivalent (FLUBLOK) IM injection 0 5 mL (not administered)   ketamine (KETALAR) 50 mg/mL 50 mg (50 mg Intramuscular Given 12/2/18 2321)   HYDROmorphone (DILAUDID) injection 0 5 mg (0 5 mg Intravenous Given 12/2/18 2226)   benzocaine (HURRICAINE) 20 % mucosal spray 2 spray (2 sprays Mucosal Given 12/2/18 2333)   ciprofloxacin (CIPRO) IVPB (premix) 400 mg (0 mg Intravenous Stopped 12/3/18 0222)   metroNIDAZOLE (FLAGYL) IVPB (premix) 500 mg (0 mg Intravenous Stopped 12/3/18 0057)   iohexol (OMNIPAQUE) 350 MG/ML injection (MULTI-DOSE) 100 mL (100 mL Intravenous Given 12/3/18 0207)       Diagnostic Studies  Results Reviewed     Procedure Component Value Units Date/Time    Stool Enteric Bacterial Panel by PCR [[id number]] Collected:  12/03/18 0306    Lab Status: In process Specimen:  Stool from Rectum Updated:  12/03/18 0339    Clostridium difficile toxin by PCR [[id number]] Collected:  12/03/18 0306    Lab Status: In process Specimen:  Stool from Per Rectum Updated:  12/03/18 0337    Lactic acid, plasma [[id number]]  (Normal) Collected:  12/03/18 0146    Lab Status:  Final result Specimen:  Blood from Hand, Right Updated:  12/03/18 0211     LACTIC ACID 1 1 mmol/L     Narrative:         Result may be elevated if tourniquet was used during collection      CBC and differential [[id number]]  (Abnormal) Collected:  12/03/18 0126    Lab Status:  Final result Specimen:  Blood from Arm, Right Updated: 12/03/18 0205     WBC 12 00 (H) Thousand/uL      RBC 3 78 (L) Million/uL      Hemoglobin 11 5 (L) g/dL      Hematocrit 34 7 (L) %      MCV 92 fL      MCH 30 4 pg      MCHC 33 1 g/dL      RDW 12 8 %      MPV 8 7 (L) fL      Platelets 455 (H) Thousands/uL      nRBC 0 /100 WBCs     Narrative: This is an appended report  These results have been appended to a previously verified report  Comprehensive metabolic panel [[id number]]  (Abnormal) Collected:  12/03/18 0126    Lab Status:  Final result Specimen:  Blood from Arm, Right Updated:  12/03/18 0204     Sodium 135 (L) mmol/L      Potassium 3 3 (L) mmol/L      Chloride 98 (L) mmol/L      CO2 28 mmol/L      ANION GAP 9 mmol/L      BUN 14 mg/dL      Creatinine 0 98 mg/dL      Glucose 126 mg/dL      Calcium 8 1 (L) mg/dL      AST 14 U/L      ALT 26 U/L      Alkaline Phosphatase 70 U/L      Total Protein 5 8 (L) g/dL      Albumin 2 1 (L) g/dL      Total Bilirubin 0 40 mg/dL      eGFR 87 ml/min/1 73sq m     Narrative:         National Kidney Disease Education Program recommendations are as follows:  GFR calculation is accurate only with a steady state creatinine  Chronic Kidney disease less than 60 ml/min/1 73 sq  meters  Kidney failure less than 15 ml/min/1 73 sq  meters      CK [[id number]]  (Normal) Collected:  12/03/18 0126    Lab Status:  Final result Specimen:  Blood from Arm, Right Updated:  12/03/18 0204     Total CK 52 U/L     Lipase [[id number]]  (Abnormal) Collected:  12/03/18 0126    Lab Status:  Final result Specimen:  Blood from Arm, Right Updated:  12/03/18 0204     Lipase 36 (L) u/L     Magnesium [[id number]]  (Normal) Collected:  12/03/18 0126    Lab Status:  Final result Specimen:  Blood from Arm, Right Updated:  12/03/18 0204     Magnesium 1 6 mg/dL     Phosphorus [[id number]]  (Abnormal) Collected:  12/03/18 0126    Lab Status:  Final result Specimen:  Blood from Arm, Right Updated:  12/03/18 0204     Phosphorus 2 3 (L) mg/dL                  CT abdomen pelvis w contrast   Final Result by Courtney Mayorga MD (12/03 5517)      Findings suggestive of colitis, as described above  Please see discussion  Clinical and laboratory correlation is recommended  C  difficile should be excluded  There is more gaseous distention of the colon when compared to the prior study, suggesting    a component of colonic ileus  There is no small bowel obstruction  The distal tip of the appendix appears somewhat inflamed, however, this is likely due to adjacent free fluid  This is similar in appearance to December 1, 2018  If there is clinical concern for tip appendicitis, follow-up would be suggested  There is an approximately 7 x 4 mm pulmonary nodule at the left lung base  Nonemergent outpatient CT of the chest is recommended for further evaluation  Please see discussion  Mild bibasilar atelectasis  Fatty infiltration of the liver  Other findings as described above, please see discussion  Workstation performed: [id number]         XR abdomen 1 view kub   ED Interpretation by River Currie PA-C (12/03 3394)   Notably dilated bowel loops throughout the small bowel and colon, diffusely  The NG tube is in the correct position  No noted free air under the diaphragm  No osseous abnormalities  XR chest 2 views    (Results Pending)              Procedures  ECG 12 Lead Documentation  Date/Time: 12/3/2018 3:07 AM  Performed by: Edwin Barragan  Authorized by: Edwin Barragan     Indications / Diagnosis:  Abdominal pain  ECG reviewed by me, the ED Provider: yes    Patient location:  ED  Previous ECG:     Previous ECG:  Compared to current    Comparison ECG info:  December 1, 2018, 6:51 a m :  Rate 79, normal sinus rhythm, normal [place] interval, normal QRS duration, normal ST segment duration, no noted ST segment elevation or depression, no noted ectopic beats, normal axis      Similarity:  No change    Comparison to cardiac monitor: Yes Interpretation:     Interpretation: normal    Rate:     ECG rate:  80    ECG rate assessment: normal    Rhythm:     Rhythm: sinus rhythm    Ectopy:     Ectopy: none    QRS:     QRS axis:  Normal    QRS intervals:  Normal  Conduction:     Conduction: normal    ST segments:     ST segments:  Normal  T waves:     T waves: normal             Phone Contacts  ED Phone Contact    ED Course                               MDM  Number of Diagnoses or Management Options  Abdominal distension: new and requires workup  Diverticulitis: new and requires workup  Diagnosis management comments: Most likely diagnosis is acute diverticulitis, with associated abdominal distention  Initial flat plate x-ray revealed notably dilated bowel loops, raising the concern for potential bowel obstruction  As such, decompression nasogastric tube was placed, and a CT scan of the patient's abdomen was performed  There was notable increased gas within the patient's GI tract, but no noted perforation  In addition, there is no noted bowel obstruction  As such, contact making surgical service, who agreed to assume care for this patient         Amount and/or Complexity of Data Reviewed  Clinical lab tests: ordered and reviewed  Tests in the radiology section of CPT®: ordered and reviewed  Tests in the medicine section of CPT®: reviewed and ordered  Decide to obtain previous medical records or to obtain history from someone other than the patient: yes  Obtain history from someone other than the patient: yes  Review and summarize past medical records: yes  Discuss the patient with other providers: yes  Independent visualization of images, tracings, or specimens: yes    Risk of Complications, Morbidity, and/or Mortality  Presenting problems: moderate  Diagnostic procedures: moderate  Management options: moderate    Patient Progress  Patient progress: improved    CritCare Time    Disposition  Final diagnoses:   Diverticulitis   Abdominal distension Time reflects when diagnosis was documented in both MDM as applicable and the Disposition within this note     Time User Action Codes Description Comment    12/3/2018  5:42 AM Karon Huerta Add [K57 92] Diverticulitis     12/3/2018  5:42 AM KarleneHelena Chatman Add [R14 0] Abdominal distension       ED Disposition     ED Disposition Condition Comment    Admit  Case was discussed with Tai Merida and the patient's admission status was agreed to be Admission Status: inpatient status to the service of Dr Papito Augustine  Follow-up Information    None         Current Discharge Medication List      CONTINUE these medications which have NOT CHANGED    Details   aspirin 81 MG tablet Take 81 mg by mouth daily  ciprofloxacin (CIPRO) 500 mg tablet Take 1 tablet (500 mg total) by mouth every 12 (twelve) hours for 7 days  Qty: 14 tablet, Refills: 0    Associated Diagnoses: Left lower quadrant pain      losartan (COZAAR) 50 mg tablet Take 50 mg by mouth daily    Refills: 2      metroNIDAZOLE (FLAGYL) 500 mg tablet Take 1 tablet (500 mg total) by mouth every 8 (eight) hours for 10 days  Qty: 30 tablet, Refills: 0    Associated Diagnoses: Left lower quadrant pain      MULTIPLE VITAMIN PO Take by mouth  ondansetron (ZOFRAN-ODT) 4 mg disintegrating tablet Take 1 tablet (4 mg total) by mouth every 6 (six) hours as needed for nausea or vomiting  Qty: 20 tablet, Refills: 0    Associated Diagnoses: Left lower quadrant pain; Rectal bleeding      potassium chloride (K-DUR,KLOR-CON) 10 mEq tablet Take 1 tablet (10 mEq total) by mouth 2 (two) times a day  Qty: 10 tablet, Refills: 0    Associated Diagnoses: Hypokalemia           No discharge procedures on file      ED Provider  Electronically Signed by           Ruth Ann Ascencio PA-C  12/03/18 6868

## 2018-12-03 NOTE — RESPIRATORY THERAPY NOTE
RT Protocol Note  Alma Rosa Harry 46 y o  male MRN: [id number]  Unit/Bed#: -01 Encounter: [id number]    Assessment    Active Problems:    * No active hospital problems  *      Home Pulmonary Medications:  N/A       Past Medical History:   Diagnosis Date    Hypertension      Social History     Social History    Marital status: /Civil Union     Spouse name: N/A    Number of children: N/A    Years of education: N/A     Social History Main Topics    Smoking status: Former Smoker    Smokeless tobacco: Current User    Alcohol use Yes      Comment: social    Drug use: No    Sexual activity: Not Asked     Other Topics Concern    None     Social History Narrative    Caffeine use: 2 servings daily           Subjective         Objective    Physical Exam:   Assessment Type: (P) Assess only  General Appearance: (P) Alert, Awake  Respiratory Pattern: (P) Normal  Chest Assessment: (P) Chest expansion symmetrical  Bilateral Breath Sounds: (P) Clear, Diminished  Cough: (P) None  O2 Device: (P) NC    Vitals:  Blood pressure 112/60, pulse (P) 86, temperature 97 6 °F (36 4 °C), temperature source Oral, resp  rate (P) 18, weight 93 4 kg (206 lb), SpO2 (P) 98 %  Imaging and other studies: I have personally reviewed pertinent reports  O2 Device: (P) NC     Plan    Respiratory Plan: (P) No distress/Pulmonary history        Resp Comments: (P) Patient is assessed per protocol; BS are decreased and clear & spO2 is 98 on 2L NC  Patient has no pulmonary history or home regimen; however, he states that he has worn a bipap several years ago  Patient denies any distress at this time, no treatments ordered at this time  Patient to be re-evaluated as needed

## 2018-12-03 NOTE — PROGRESS NOTES
Progress Note - General Surgery   Brittany Bradley 46 y o  male MRN: [id number]  Unit/Bed#: -01 Encounter: [id number]    Assessment/Plan:  Brittany Bradley, HD#2, admitted last evening for abdominal pain, BRBPR, and colitis    1  Colitis with acute abdominal pain and lower GI bleed, stable   -CT A/P as below c/w colitis +/- colonic ileus, mildly inflamed distal appendix  -WBC trending down - Unasyn/Flagyl IV   -IVF, IV pain control   -maintain NPO/NGT  NGT with low OP overnight (25cc) - check OBS series for proper position and/or resolution of colonic distension    -stool studies and C diff collected - pending   -appreciate GI consult - last colonoscopy for diverticulosis in 2016 Dr Gillette Chowdhury until GI bleed has resolved  Continue SCDs for DVT prophylaxis  2  Hypokalemia, resolved  - resume home dose of Kdur 10 mEq BID  3  HTN, chronic    -stable since admission   -OK to resume home meds - Cozaar     2  VTE Prophylaxis   Pharmacologic: Enoxaparin (Lovenox) - HOLD   Mechanical: sequential compression device    Subjective:  - Pain: has moderate pain "all over" abdomen  Requests pain meds at this time  - Current diet: NPO  - no vomiting and + Nausea  - + BM and + Flatus - frequent fecal urgency for small volume liquid "mostly bloody" stools  Diarrhea twice since admission  No pain with BM   - Laying in bed   - Patient and wife confirm presence of firmly distended abdomen is "twice the size that it usually is"  Nausea without vomiting  No significant change in abd distension noted since NGT placed late last evening  Abd pain persists "all over", worse with movement  Patient laying still in bed, "could feel better"  No recent hx abx exposure  Blood present in stool for over one month  Wife mentions being treated herself for a spider bite with ABX in the last month and then having diarrhea concerning for C diff  Resolved on its own  Wonders if she could have given this to her   No reported intolerance or allergies to food  No sick contacts, no similar episodes, no recent infectious-type diarrhea "food poisoning"  Had colonoscopy roughly two years ago with Dr Dale Quarles  Objective:     Vitals:   Vitals:    18 0345 18 0520 18 0600 18 0700   BP: 112/60   130/70   BP Location: Left arm   Left arm   Pulse: 83 86  83   Resp: 18 18  20   Temp: 97 6 °F (36 4 °C)   97 8 °F (36 6 °C)   TempSrc: Oral   Oral   SpO2: 96% 98%  94%   Weight:   93 kg (205 lb 0 4 oz)        I/O:   I/O       701 -  07 -  07 07 -  0700    NG/GT  30 0    Total Intake(mL/kg)  30 (0 3) 0 (0)    Urine (mL/kg/hr)   300 (2 3)    Emesis/NG output   25    Total Output     325    Net   +30 -325                 Labs:  Recent Labs      18   0632  18   0126  18   0620   HGB  12 0  11 5*  11 0*     Recent Labs      18   0632  18   0126  18   0620   WBC  12 98*  12 00*  10 79*     Imagin/3/18  CT ABDOMEN AND PELVIS WITH IV CONTRAST     INDICATION:   Abdominal distension  Abdominal pain, unspecified  Abdominal pain, rectal bleeding, decreased appetite  Diagnosed with colitis on 2018, given oral antibiotics      COMPARISON:  2018  STOMACH AND BOWEL:  An orogastric tube is present in the stomach  There is gaseous distention of the colon, increased compared to the prior study  Areas of colonic wall thickening are again evident, most pronounced involving the left colon and mid to   distal transverse colon  There is also some bowel wall thickening involving the hepatic flexure and distal right colon, as well as the rectosigmoid  There is some infiltration of the adjacent fat, most pronounced adjacent to the left colon  There is   free fluid tracking down the paracolic gutters  These findings suggest colitis  C  difficile should be excluded  Clinical and laboratory correlation is recommended    The small bowel is of normal caliber      APPENDIX:  The base of the appendix and proximal appendix are normal in appearance  There is some fluid within the distal tip of the appendix and the distal tip of the appendix enhances  The distal tip of the appendix measures 8-9 mm diameter  This   appears similar to the prior study and may be due to the adjacent free fluid  Clinical correlation regarding the possibility of tip appendicitis is is necessary  If clinically appropriate, follow-up could be performed      ABDOMINOPELVIC CAVITY:  As described above  No pneumoperitoneum  IMPRESSION:     Findings suggestive of colitis, as described above  Please see discussion  Clinical and laboratory correlation is recommended  C  difficile should be excluded  There is more gaseous distention of the colon when compared to the prior study, suggesting   a component of colonic ileus  There is no small bowel obstruction      The distal tip of the appendix appears somewhat inflamed, however, this is likely due to adjacent free fluid  This is similar in appearance to December 1, 2018  If there is clinical concern for tip appendicitis, follow-up would be suggested      There is an approximately 7 x 4 mm pulmonary nodule at the left lung base  Nonemergent outpatient CT of the chest is recommended for further evaluation  Please see discussion      Mild bibasilar atelectasis      Fatty infiltration of the liver  Exam:  General: alert, appears stated age and mild distress, laying supine and laying still in bed, NGT taped to nose draining to wall suction  HEENT: Head: Normocephalic, no lesions, without obvious abnormality  Skin: no rashes, no jaundice  Chest: Normal chest wall and respirations  Clear to auscultation  Heart[de-identified] regular rate and rhythm, S1, S2 normal, no murmur, click, rub or gallop  Abdomen: large and firmly distended with mild tendernessin the entire abdomen  , tympanic to percussion throughout    No rebound tenderness, no guarding or rigidity, no CVA tenderness, no hernias noted  Bowel sounds nearly absent, "distant tinkling"  Extremities: no edema, SCDs on and connected        Arch Chowdhury, [place]  12/3/2018

## 2018-12-03 NOTE — H&P
History and Physical - General Surgery   Sena Old 46 y o  male MRN: [id number]  Unit/Bed#: -01 Encounter: [id number]     Assessment/Plan:  Sena Old, HD#2, admitted last evening for abdominal pain, BRBPR, and colitis     1  Colitis with acute abdominal pain and lower GI bleed, stable              -CT A/P as below c/w colitis +/- colonic ileus, mildly inflamed distal appendix  -WBC trending down - Unasyn/Flagyl IV              -IVF, IV pain control              -maintain NPO/NGT  NGT with low OP overnight (25cc) - check OBS series for proper position and/or resolution of colonic distension               -stool studies and C diff collected - pending              -appreciate GI consult - last colonoscopy for diverticulosis in 2016 Dr Agnes Pineda until GI bleed has resolved  Continue SCDs for DVT prophylaxis  2  Hypokalemia, resolved  - resume home dose of Kdur 10 mEq BID  3  HTN, chronic               -stable since admission              -OK to resume home meds - Cozaar   4  VTE Prophylaxis   Pharmacologic: Enoxaparin (Lovenox) - HOLD   Mechanical: sequential compression device     Subjective:  Chief Complaint: "I've been better  Not feeling so well "    - Pain: has moderate pain "all over" abdomen  Requests pain meds at this time  - Current diet: NPO  - no vomiting and + Nausea  - + BM and + Flatus - frequent fecal urgency for small volume liquid "mostly bloody" stools  Diarrhea twice since admission  No pain with BM   - Laying in bed   - Patient admitted from the ED late last evening  Patient and wife confirm presence of firmly distended abdomen is "twice the size that it usually is"  Nausea without vomiting  No significant change in abd distension noted since NGT placed late last evening  Abd pain persists "all over", worse with movement  Patient laying still in bed, "could feel better"  No recent hx abx exposure    Blood present in stool for over one month   Wife mentions being treated herself for a spider bite with ABX in the last month and then having diarrhea concerning for C diff  Resolved on its own  Wonders if she could have given this to her   No reported intolerance or allergies to food  No sick contacts, no similar episodes, no recent infectious-type diarrhea "food poisoning"  Had colonoscopy roughly two years ago with Dr Maria M Perez      Objective:   Past Medical History:   Diagnosis Date    Hypertension       Past Surgical History:   Procedure Laterality Date    CARDIAC CATHETERIZATION      [place] COLONOSCOPY FLX DX W/COLLJ SPEC WHEN PFRMD N/A 4/25/2016    Procedure: COLONOSCOPY;  Surgeon: Stone Gonzalez MD;  Location: [facility];   Service: Gastroenterology     Family History   Problem Relation Age of Onset    Diabetes Mother     Heart disease Father         Cardiac Disorder    Heart disease Brother         Cardiac Disorder     Social History     Substance Use Topics     Smoking status: Former Smoker     Smokeless tobacco: Current User     Alcohol use Yes         Comment: social      Review of Systems - History obtained from the patient  General ROS: positive for malaise  negative for - chills, fever, night sweats or weight loss  ENT ROS: negative  Hematological and Lymphatic ROS: negative  Endocrine ROS: negative for - palpitations, polydipsia/polyuria, skin changes or unexpected weight changes  Respiratory ROS: no cough, shortness of breath, or wheezing  Cardiovascular ROS: no chest pain or dyspnea on exertion  Gastrointestinal ROS: positive for - abdominal pain, appetite loss, blood in stools, change in bowel habits, diarrhea, gas/bloating and nausea/vomiting  negative for - constipation, hematemesis or swallowing difficulty/pain  Genito-Urinary ROS: no dysuria, trouble voiding, or hematuria  Musculoskeletal ROS: negative for - muscle pain or muscular weakness  Neurological ROS: no TIA or stroke symptoms  Dermatological ROS: negative for rash      Vitals:   Vitals          Vitals:     18 0345 18 0520 18 0600 18 0700   BP: 112/60     130/70   BP Location: Left arm     Left arm   Pulse: 83 86   83   Resp:    Temp: 97 6 °F (36 4 °C)     97 8 °F (36 6 °C)   TempSrc: Oral     Oral   SpO2: 96% 98%   94%   Weight:     93 kg (205 lb 0 4 oz)                     I/O:   I/O        701 -  07 -  07 07 -  0700     NG/GT   30 0     Total Intake(mL/kg)   30 (0 3) 0 (0)     Urine (mL/kg/hr)     300 (2 3)     Emesis/NG output     25     Total Output     325     Net   +30 -325                         Labs:        Recent Labs      18   4754  18   0126  18   0620   HGB  12 0  11 5*  11 0*            Recent Labs      18   0632  18   0126  18   0620   WBC  12 98*  12 00*  10 79*      Imagin/3/18  CT ABDOMEN AND PELVIS WITH IV CONTRAST     INDICATION:   Abdominal distension  Abdominal pain, unspecified   Abdominal pain, rectal bleeding, decreased appetite   Diagnosed with colitis on 2018, given oral antibiotics      COMPARISON:  2018  STOMACH AND BOWEL:  An orogastric tube is present in the stomach   There is gaseous distention of the colon, increased compared to the prior study   Areas of colonic wall thickening are again evident, most pronounced involving the left colon and mid to   distal transverse colon   There is also some bowel wall thickening involving the hepatic flexure and distal right colon, as well as the rectosigmoid   There is some infiltration of the adjacent fat, most pronounced adjacent to the left colon   There is   free fluid tracking down the paracolic gutters   These findings suggest colitis   C  difficile should be excluded   Clinical and laboratory correlation is recommended   The small bowel is of normal caliber      APPENDIX:  The base of the appendix and proximal appendix are normal in appearance  Chris Hammed is some fluid within the distal tip of the appendix and the distal tip of the appendix enhances   The distal tip of the appendix measures 8-9 mm diameter   This   appears similar to the prior study and may be due to the adjacent free fluid   Clinical correlation regarding the possibility of tip appendicitis is is necessary   If clinically appropriate, follow-up could be performed      ABDOMINOPELVIC CAVITY:  As described above   No pneumoperitoneum      IMPRESSION:     Findings suggestive of colitis, as described above   Please see discussion   Clinical and laboratory correlation is recommended   C  difficile should be excluded   There is more gaseous distention of the colon when compared to the prior study, suggesting   a component of colonic ileus  Chris Hammed is no small bowel obstruction      The distal tip of the appendix appears somewhat inflamed, however, this is likely due to adjacent free fluid   This is similar in appearance to December 1, 2018   If there is clinical concern for tip appendicitis, follow-up would be suggested      There is an approximately 7 x 4 mm pulmonary nodule at the left lung base   Nonemergent outpatient CT of the chest is recommended for further evaluation   Please see discussion      Mild bibasilar atelectasis      Fatty infiltration of the liver      Exam:  General: alert, appears stated age and mild distress, laying supine and laying still in bed, NGT taped to nose draining to wall suction  HEENT: Head: Normocephalic, no lesions, without obvious abnormality  Skin: no rashes, no jaundice  Chest: Normal chest wall and respirations  Clear to auscultation  Heart[de-identified] regular rate and rhythm, S1, S2 normal, no murmur, click, rub or gallop  Abdomen: large and firmly distended with mild tendernessin the entire abdomen  , tympanic to percussion throughout  No rebound tenderness, no guarding or rigidity, no CVA tenderness, no hernias noted   Bowel sounds nearly absent, "distant tinkling"    Extremities: no edema, SCDs on and connected         Augusto Grant PA-C  12/3/2018

## 2018-12-03 NOTE — PLAN OF CARE
PAIN - ADULT     Verbalizes/displays adequate comfort level or baseline comfort level Progressing        Potential for Falls     Patient will remain free of falls Progressing        SAFETY ADULT     Maintain or return to baseline ADL function Progressing     Maintain or return mobility status to optimal level Progressing

## 2018-12-03 NOTE — CONSULTS
Consultation - [facility]  Aury Heart 46 y o  male MRN: [id number]  Unit/Bed#: -01 Encounter: [id number]        Inpatient consult to gastroenterology  Consult performed by: Guru Arango ordered by: Donnell Caldwell          Reason for Consult / Principal Problem: Colitis with rectal bleeding    HPI: Aury Heart is a 46y o  year old male with history of hypertension who presents for evaluation of abdominal distention, diffuse abdominal pain and rectal bleeding  He was seen in the office of Dr Edward Palomino 4 days ago (11/30), complaining of intermittent left lower quadrant pain for the last month, worsened with eating  He had also been noticing bright red blood per rectum intermittently which had been worsened over the previous 3 days  He was ordered for CT scan for further evaluation but was treated clinically for diverticulitis with prescription for Cipro and Flagyl  The next day he came to the ER at THE [facility] as his pain and worsened and he noted increased rectal bleeding  He had CT scan done that day (12/1) showing diffuse colitis extending from the hepatic flexure through the rectum, and some nonspecific left pelvic sidewall lymph nodes measuring up to 0 9 cm  He was again diagnosed with diverticulitis, discharged and advised to continue cipro and flagyl  He then came to the ER at [address] [place] [address] yesterday evening, complaining of inability to eat or drink due to severe pain  On this presentation, white count was elevated at 12 with 19 bands  CT scan was repeated early this morning and this showed gaseous distention of the colon increased compared to prior study, with persistent thickening of the colon, most pronounced in the left colon and mid to distal transverse colon, also involving the hepatic flexure and distal right colon as well as the rectosigmoid  Stool studies have been ordered, C diff and enteric bacterial panel are pending      He had NG tube placed, which has reportedly put out only minimal material; patient reports no improvement in abdominal distention overnight and is still uncomfortable  He had an obstruction series done a few minutes ago which is currently pending  He says he is passing gas from time to time  Denies any known family history of inflammatory bowel disease  Denies any recent travel or taking any antibiotics in the last couple of months, except for the Cipro and Flagyl he was prescribed after seeing his PCP  Patient had a colonoscopy 2 years ago for screening purposes (Dr Annia Douglas), which was normal     REVIEW OF SYSTEMS:    CONSTITUTIONAL: Denies any fever, chills, or rigors  Good appetite, and no recent weight loss  HEENT: No earache or tinnitus  Denies hearing loss or visual disturbances  CARDIOVASCULAR: No chest pain or palpitations  RESPIRATORY: Denies any cough, hemoptysis, shortness of breath or dyspnea on exertion  GASTROINTESTINAL: As noted in the History of Present Illness  GENITOURINARY: No problems with urination  Denies any hematuria or dysuria  NEUROLOGIC: No dizziness or vertigo, denies headaches  MUSCULOSKELETAL: Denies any muscle or joint pain  SKIN: Denies skin rashes or itching  ENDOCRINE: Denies excessive thirst  Denies intolerance to heat or cold  PSYCHOSOCIAL: Denies depression or anxiety  Denies any recent memory loss  Historical Information   Past Medical History:   Diagnosis Date    Hypertension      Past Surgical History:   Procedure Laterality Date    CARDIAC CATHETERIZATION      FL COLONOSCOPY FLX DX W/COLLJ SPEC WHEN PFRMD N/A 4/25/2016    Procedure: COLONOSCOPY;  Surgeon: Neil Balderas MD;  [facility];   Service: Gastroenterology     Social History   History   Alcohol Use    Yes     Comment: social     History   Drug Use No     History   Smoking Status    Former Smoker   Smokeless Tobacco    Current User     Family History   Problem Relation Age of Onset    Diabetes Mother     Heart disease Father         Cardiac Disorder    Heart disease Brother         Cardiac Disorder       Meds/Allergies     Prescriptions Prior to Admission   Medication    aspirin 81 MG tablet    ciprofloxacin (CIPRO) 500 mg tablet    losartan (COZAAR) 50 mg tablet    metroNIDAZOLE (FLAGYL) 500 mg tablet    MULTIPLE VITAMIN PO    ondansetron (ZOFRAN-ODT) 4 mg disintegrating tablet    potassium chloride (K-DUR,KLOR-CON) 10 mEq tablet     Current Facility-Administered Medications   Medication Dose Route Frequency    acetaminophen (TYLENOL) tablet 650 mg  650 mg Oral Q6H PRN    ampicillin-sulbactam (UNASYN) 3 g in sodium chloride 0 9 % 100 mL IVPB  3 g Intravenous Q6H    docusate sodium (COLACE) capsule 100 mg  100 mg Oral BID PRN    HYDROmorphone (DILAUDID) injection 0 5 mg  0 5 mg Intravenous Q4H PRN    influenza vaccine, recombinant, quadrivalent (FLUBLOK) IM injection 0 5 mL  0 5 mL Intramuscular Prior to discharge    losartan (COZAAR) tablet 50 mg  50 mg Oral Daily    metroNIDAZOLE (FLAGYL) IVPB (premix) 500 mg  500 mg Intravenous Q8H    nicotine (NICODERM CQ) 7 mg/24hr TD 24 hr patch 1 patch  1 patch Transdermal Daily    ondansetron (ZOFRAN) injection 4 mg  4 mg Intravenous Q4H PRN    oxyCODONE-acetaminophen (PERCOCET) 5-325 mg per tablet 1 tablet  1 tablet Oral Q4H PRN    potassium chloride (K-DUR,KLOR-CON) CR tablet 10 mEq  10 mEq Oral BID    sodium chloride 0 9 % infusion  125 mL/hr Intravenous Continuous       No Known Allergies        Objective     Blood pressure 130/70, pulse 83, temperature 97 8 °F (36 6 °C), temperature source Oral, resp  rate 20, weight 93 kg (205 lb 0 4 oz), SpO2 94 %        Intake/Output Summary (Last 24 hours) at 12/03/18 1005  Last data filed at 12/03/18 0750   Gross per 24 hour   Intake               30 ml   Output              325 ml   Net             -295 ml         PHYSICAL EXAM     General Appearance:   Alert, cooperative, no distress, appears stated age    HEENT:   Normocephalic, atraumatic, anicteric      Neck:  Supple, symmetrical, trachea midline, no adenopathy;    thyroid: no enlargement/tenderness/nodules; no carotid  bruit or JVD    Lungs:   Clear to auscultation bilaterally; no rales, rhonchi or wheezing; respirations unlabored    Heart[de-identified]   S1 and S2 normal; regular rate and rhythm; no murmur, rub, or gallop     Abdomen:   Significantly distended, diffusely tender with some voluntary guarding noted, tympanic to percussion, non-distended; normal bowel sounds; no masses, no organomegaly    Genitalia:   Deferred    Rectal:   Deferred    Extremities:  No cyanosis, clubbing or edema    Pulses:  2+ and symmetric all extremities    Skin:  Skin color, texture, turgor normal, no rashes or lesions    Lymph nodes:  No palpable cervical, axillary or inguinal lymphadenopathy        Lab Results:   Admission on 12/02/2018   Component Date Value    WBC 12/03/2018 12 00*    RBC 12/03/2018 3 78*    Hemoglobin 12/03/2018 11 5*    Hematocrit 12/03/2018 34 7*    MCV 12/03/2018 92     MCH 12/03/2018 30 4     MCHC 12/03/2018 33 1     RDW 12/03/2018 12 8     MPV 12/03/2018 8 7*    Platelets 42/36/8825 558*    nRBC 12/03/2018 0     Sodium 12/03/2018 135*    Potassium 12/03/2018 3 3*    Chloride 12/03/2018 98*    CO2 12/03/2018 28     ANION GAP 12/03/2018 9     BUN 12/03/2018 14     Creatinine 12/03/2018 0 98     Glucose 12/03/2018 126     Calcium 12/03/2018 8 1*    AST 12/03/2018 14     ALT 12/03/2018 26     Alkaline Phosphatase 12/03/2018 70     Total Protein 12/03/2018 5 8*    Albumin 12/03/2018 2 1*    Total Bilirubin 12/03/2018 0 40     eGFR 12/03/2018 87     Total CK 12/03/2018 52     LACTIC ACID 12/03/2018 1 1     Lipase 12/03/2018 36*    Magnesium 12/03/2018 1 6     Phosphorus 12/03/2018 2 3*    Segmented % 12/03/2018 48     Bands % 12/03/2018 19*    Lymphocytes % 12/03/2018 12*    Monocytes % 12/03/2018 18*    Eosinophils, % 12/03/2018 2     Basophils % 12/03/2018 0     Atypical Lymphocytes % 12/03/2018 1*    Absolute Neutrophils 12/03/2018 8 04*    Lymphocytes Absolute 12/03/2018 1 44     Monocytes Absolute 12/03/2018 2 16*    Eosinophils Absolute 12/03/2018 0 24     Basophils Absolute 12/03/2018 0 00     Total Counted 12/03/2018 100     Polychromasia 12/03/2018 Present     Tear Drop Cells 12/03/2018 Present     Platelet Estimate 32/42/1727 Increased*    WBC 12/03/2018 10 79*    RBC 12/03/2018 3 59*    Hemoglobin 12/03/2018 11 0*    Hematocrit 12/03/2018 32 4*    MCV 12/03/2018 90     MCH 12/03/2018 30 6     MCHC 12/03/2018 34 0     RDW 12/03/2018 13 0     MPV 12/03/2018 8 0*    Platelets 55/75/5606 490*    nRBC 12/03/2018 0     Sodium 12/03/2018 137     Potassium 12/03/2018 3 5     Chloride 12/03/2018 102     CO2 12/03/2018 27     ANION GAP 12/03/2018 8     BUN 12/03/2018 13     Creatinine 12/03/2018 0 87     Glucose 12/03/2018 125     Calcium 12/03/2018 7 7*    eGFR 12/03/2018 98     Magnesium 12/03/2018 1 8     Segmented % 12/03/2018 61     Bands % 12/03/2018 5     Lymphocytes % 12/03/2018 16     Monocytes % 12/03/2018 17*    Eosinophils, % 12/03/2018 1     Basophils % 12/03/2018 0     Absolute Neutrophils 12/03/2018 7 12     Lymphocytes Absolute 12/03/2018 1 73     Monocytes Absolute 12/03/2018 1 83*    Eosinophils Absolute 12/03/2018 0 11     Basophils Absolute 12/03/2018 0 00     Total Counted 12/03/2018 100     Dohle Bodies 12/03/2018 Present     Toxic Granulation 12/03/2018 Present     Polychromasia 12/03/2018 Present     Platelet Estimate 11/67/0084 Increased*     Results for Akbar Murray (MRN [id number]) as of 12/3/2018 10:06   Ref   Range 12/3/2018 01:26 12/3/2018 01:46 12/3/2018 02:48 12/3/2018 03:06 12/3/2018 06:20 12/3/2018 10:05   eGFR Latest Units: ml/min/1 73sq m 87    98    Sodium Latest Ref Range: 136 - 145 mmol/L 135 (L)    137    Potassium Latest Ref Range:  3 5 - 5 3 mmol/L 3 3 (L)    3 5    Chloride Latest Ref Range: 100 - 108 mmol/L 98 (L)    102    CO2 Latest Ref Range: 21 - 32 mmol/L 28    27    Anion Gap Latest Ref Range: 4 - 13 mmol/L 9    8    BUN Latest Ref Range: 5 - 25 mg/dL 14    13    Creatinine Latest Ref Range: 0 60 - 1 30 mg/dL 0 98    0 87    Glucose, Random Latest Ref Range: 65 - 140 mg/dL 126    125    Calcium Latest Ref Range: 8 3 - 10 1 mg/dL 8 1 (L)    7 7 (L)    AST Latest Ref Range: 5 - 45 U/L 14        ALT Latest Ref Range: 12 - 78 U/L 26        Alkaline Phosphatase Latest Ref Range: 46 - 116 U/L 70        Total Protein Latest Ref Range: 6 4 - 8 2 g/dL 5 8 (L)        Albumin Latest Ref Range: 3 5 - 5 0 g/dL 2 1 (L)        TOTAL BILIRUBIN Latest Ref Range: 0 20 - 1 00 mg/dL 0 40        Phosphorus Latest Ref Range: 2 7 - 4 5 mg/dL 2 3 (L)        Magnesium Latest Ref Range: 1 6 - 2 6 mg/dL 1 6    1 8    Lipase Latest Ref Range: 73 - 393 u/L 36 (L)        Total CK Latest Ref Range: 39 - 308 U/L 52        LACTIC ACID Latest Ref Range: 0 5 - 2 0 mmol/L  1 1       WBC Latest Ref Range: 4 31 - 10 16 Thousand/uL 12 00 (H)    10 79 (H)    Red Blood Cell Count Latest Ref Range: 3 88 - 5 62 Million/uL 3 78 (L)    3 59 (L)    Hemoglobin Latest Ref Range: 12 0 - 17 0 g/dL 11 5 (L)    11 0 (L)    HCT Latest Ref Range: 36 5 - 49 3 % 34 7 (L)    32 4 (L)    MCV Latest Ref Range: 82 - 98 fL 92    90    MCH Latest Ref Range: 26 8 - 34 3 pg 30 4    30 6    MCHC Latest Ref Range: 31 4 - 37 4 g/dL 33 1    34 0    RDW Latest Ref Range: 11 6 - 15 1 % 12 8    13 0    Platelet Count Latest Ref Range: 149 - 390 Thousands/uL 558 (H)    490 (H)    MPV Latest Ref Range: 8 9 - 12 7 fL 8 7 (L)    8 0 (L)    Platelet Estimate Latest Ref Range: Adequate  Increased (A)    Increased (A)    nRBC Latest Units: /100 WBCs 0    0    Segs Relative Latest Ref Range: 43 - 75 % 48    61    Abs Neutrophils Latest Ref Range: 1 85 - 7 62 Thousand/uL 8 04 (H)    7 12    Bands Relative Latest Ref Range: 0 - 8 % 19 (H)    5    Lymphocytes % Latest Ref Range: 14 - 44 % 12 (L)    16    Monocytes Latest Ref Range: 4 - 12 % 18 (H)    17 (H)    Eosinophils Latest Ref Range: 0 - 6 % 2    1    Basophils Relative Latest Ref Range: 0 - 1 % 0    0    Atypical Lymphocytes % Latest Ref Range: <=0 % 1 (H)        Absolute Eosinophils Latest Ref Range: 0 00 - 0 40 Thousand/uL 0 24    0 11    Basophils Absolute Latest Ref Range: 0 00 - 0 10 Thousand/uL 0 00    0 00    Total Counted Unknown 100    100    Polychromasia Unknown Present    Present    Tear Drop Cells Unknown Present        Dohle Bodies Unknown     Present    Toxic Granulation Unknown     Present    CLOSTRIDIUM DIFFICILE TOXIN BY PCR Unknown    Rpt ((NONE))     LYMPHOCYTES ABSOLUTE Latest Ref Range: 0 60 - 4 47 Thousand/uL 1 44    1 73    Monocytes Absolute Latest Ref Range: 0 00 - 1 22 Thousand/uL 2 16 (H)    1 83 (H)    ENTERIC BACTERIAL PANEL BY PCR Unknown    Rpt ((NONE))     XR ABDOMEN OBSTRUCTION SERIES Unknown      Rpt ((NONE))   XR CHEST PA & LATERAL Unknown   Rpt          Imaging Studies: I have personally reviewed pertinent reports  Colonoscopy, Dr Chelsea Yang, 4/2016  FINDINGS:  There was evidence of mild diverticulosis in the sigmoid colon  Small internal hemorrhoids were found  COMPLICATIONS: There were no complications  IMPRESSIONS: Mild diverticulosis found in the sigmoid colon  Internal   hemorrhoids          CT ABDOMEN AND PELVIS WITH AND WITHOUT IV CONTRAST 12/1/18     INDICATION:   GI bleeding      COMPARISON:  None      TECHNIQUE: Initial CT of the abdomen and pelvis was performed without intravenous contrast   Subsequent dynamic CT evaluation of the abdomen and pelvis was performed after the administration of intravenous contrast in both nephrographic and delayed   phases after the administration of intravenous contrast   Axial, sagittal, and coronal 2D reformatted images were created from the source data and submitted for interpretation       Radiation dose length product (DLP) for this visit:  0459 mGy-cm   This examination, like all CT scans performed in the [facility], was performed utilizing techniques to minimize radiation dose exposure, including the use of iterative   reconstruction and automated exposure control      IV Contrast:  100 mL of iohexol (OMNIPAQUE)  350  Enteric Contrast:  Enteric contrast was not administered      FINDINGS:     ABDOMEN     RIGHT KIDNEY AND URETER:  No solid renal mass  No detectable urothelial mass  No hydronephrosis or hydroureter  No urinary tract calculi  No perinephric collection      LEFT KIDNEY AND URETER:  No solid renal mass  No detectable urothelial mass  No hydronephrosis or hydroureter  No urinary tract calculi  No perinephric collection      URINARY BLADDER:  No bladder wall mass  No calculi      LOWER CHEST:  Minimal bibasilar, right middle lobe, and lingular platelike subsegmental atelectasis and/or scarring      LIVER/BILIARY TREE:  Liver is diffusely decreased in density consistent with fatty change  No CT evidence of suspicious hepatic mass  Normal hepatic contours  No biliary dilatation      GALLBLADDER:  No calcified gallstones  No pericholecystic inflammatory change      SPLEEN:  Unremarkable      PANCREAS:  Unremarkable      ADRENAL GLANDS:  Unremarkable      STOMACH AND BOWEL:  Circumferential thickening of the hepatic flexure through the rectum  Mild diffuse some mucosal hyperemia  No pneumatosis coli or bowel obstruction  Small 2nd duodenal segment diverticulum      ABDOMINOPELVIC CAVITY:  Minimal simple free fluid in the left paracolic gutter and pelvis  No abscess  No active hemorrhage      No free gas  Shotty portacaval lymph nodes attributed to chronic hepatocellular disease    Additional shotty para-aortic lymph nodes are present      Left pelvic sidewall shotty lymph nodes are present measuring up to 0 9 cm in short axis      VESSELS:  Aortoiliac calcification  No aneurysm      PELVIS     REPRODUCTIVE ORGANS:  Unremarkable for patient's age      APPENDIX: A normal appendix was visualized      ABDOMINAL WALL/INGUINAL REGIONS: 1 2 cm ventral umbilical abdominal wall diastases containing fat  No bowel herniation  Tiny bilateral fat-containing inguinal hernias  No inguinal mass      OSSEOUS STRUCTURES:  No acute fracture or destructive osseous lesion  Degenerative changes are present in the spine      IMPRESSION:     Nonspecific diffuse inflammatory or infectious colitis extending from the hepatic flexure through the rectum  Ulcerative colitis is in the differential among other etiologies      No pneumatosis coli, bowel obstruction, abscess, or free gas      Minimal simple free fluid in the left paracolic gutter and pelvis  No active bowel or extraluminal hemorrhage      Nonspecific shotty left pelvic sidewall lymph nodes measuring up to 0 9 cm in short axis likely reactive kaelyn disease  This could be followed up with repeat CT in 3 months      Hepatic steatosis  CT ABDOMEN AND PELVIS WITH IV CONTRAST 12/3/18     INDICATION:   Abdominal distension  Abdominal pain, unspecified  Abdominal pain, rectal bleeding, decreased appetite  Diagnosed with colitis on December 1, 2018, given oral antibiotics      COMPARISON:  December 1, 2018      TECHNIQUE:  CT examination of the abdomen and pelvis was performed  Axial, sagittal, and coronal 2D reformatted images were created from the source data and submitted for interpretation      Radiation dose length product (DLP) for this visit:  817 mGy-cm     This examination, like all CT scans performed in the [facility], was performed utilizing techniques to minimize radiation dose exposure, including the use of iterative   reconstruction and automated exposure control      IV Contrast:  100 mL of iohexol (OMNIPAQUE)  Enteric Contrast:  Enteric contrast was not administered      FINDINGS:     ABDOMEN     LOWER CHEST: There is mild bibasilar atelectasis  There is a 7 x 4 mm subpleural nodule laterally at the left lung base on series 2 image 18, unchanged compared to the prior study  Nonemergent outpatient CT of the chest is recommended for further   evaluation  Should this prove to be a solitary nodule, based on current Fleischner Society 2017 Guidelines on incidental pulmonary nodule, followup non-contrast CT would be recommended at 6-12 months from the initial examination and, if stable at that   time, an additional followup would be recommended for 18-24 months from the initial examination      LIVER/BILIARY TREE:  Liver is diffusely decreased in density consistent with fatty change  No CT evidence of suspicious hepatic mass  Normal hepatic contours  No biliary dilatation      GALLBLADDER:  No calcified gallstones  No pericholecystic inflammatory change      SPLEEN:  Unremarkable      PANCREAS:  Unremarkable      ADRENAL GLANDS:  Unremarkable      KIDNEYS/URETERS:  Unremarkable  No hydronephrosis      STOMACH AND BOWEL:  An orogastric tube is present in the stomach  There is gaseous distention of the colon, increased compared to the prior study  Areas of colonic wall thickening are again evident, most pronounced involving the left colon and mid to   distal transverse colon  There is also some bowel wall thickening involving the hepatic flexure and distal right colon, as well as the rectosigmoid  There is some infiltration of the adjacent fat, most pronounced adjacent to the left colon  There is   free fluid tracking down the paracolic gutters  These findings suggest colitis  C  difficile should be excluded  Clinical and laboratory correlation is recommended  The small bowel is of normal caliber      APPENDIX:  The base of the appendix and proximal appendix are normal in appearance    There is some fluid within the distal tip of the appendix and the distal tip of the appendix enhances  The distal tip of the appendix measures 8-9 mm diameter  This   appears similar to the prior study and may be due to the adjacent free fluid  Clinical correlation regarding the possibility of tip appendicitis is is necessary  If clinically appropriate, follow-up could be performed      ABDOMINOPELVIC CAVITY:  As described above  No pneumoperitoneum      VESSELS:  There is atherosclerosis  There is no evidence of abdominal aortic aneurysm      PELVIS     REPRODUCTIVE ORGANS:  Unremarkable for patient's age      URINARY BLADDER:  Unremarkable      ABDOMINAL WALL/INGUINAL REGIONS:  There is a small fat-containing umbilical hernia      OSSEOUS STRUCTURES:  No acute fracture or destructive osseous lesion      IMPRESSION:     Findings suggestive of colitis, as described above  Please see discussion  Clinical and laboratory correlation is recommended  C  difficile should be excluded  There is more gaseous distention of the colon when compared to the prior study, suggesting   a component of colonic ileus  There is no small bowel obstruction      The distal tip of the appendix appears somewhat inflamed, however, this is likely due to adjacent free fluid  This is similar in appearance to December 1, 2018  If there is clinical concern for tip appendicitis, follow-up would be suggested      There is an approximately 7 x 4 mm pulmonary nodule at the left lung base  Nonemergent outpatient CT of the chest is recommended for further evaluation  Please see discussion      Mild bibasilar atelectasis      Fatty infiltration of the liver      Other findings as described above, please see discussion          ASSESSMENT/PLAN:     1  Colitis extending from the ascending colon to the rectum with significant colonic distention concerning for colonic ileus versus obstruction  He has been experiencing intermittent but increasing rectal bleeding over the last month    Clinical picture appears suspicious for ulcerative colitis or possibly C diff infection, both of which can result in megacolon  - follow-up on findings of the obstruction series; confirm NG tube placement also  - NPO status, close surgery follow-up  - continue IV antibiotics  - follow-up on stool C diff and enteric panel  - check fecal calprotectin, inflammatory markers including sed rate and CRP    - if stool studies are negative for evidence of infectious process, may need to consider treatment with steroids  For now though, will continue with broad spectrum antibiotics  Depending on his clinical course, it would generally be preferable to pursue at least a flexible sigmoidoscopy to assess for apparent changes of ulcerative colitis to assure diagnosis, although currently with his colonic distention he would be at increased risk with insufflation from the procedure        The patient was seen and examined by Dr Pee Peck, all key medical decisions were made with Dr Pee Peck  Thank you for allowing us to participate in the care of this pleasant patient  We will follow up with you closely

## 2018-12-03 NOTE — UTILIZATION REVIEW
Initial Clinical Review    Admission: Date/Time/Statement: 12/3/18 @ 0236     Orders Placed This Encounter   Procedures    Inpatient Admission     Standing Status:   Standing     Number of Occurrences:   1     Order Specific Question:   Admitting Physician     Answer:   Dagoberto Delacruz [[id number]]     Order Specific Question:   Level of Care     Answer:   Med Surg [[id number]]     Order Specific Question:   Estimated length of stay     Answer:   More than 2 Midnights     Order Specific Question:   Certification     Answer:   I certify that inpatient services are medically necessary for this patient for a duration of greater than two midnights  See H&P and MD Progress Notes for additional information about the patient's course of treatment  ED: Date/Time/Mode of Arrival:   ED Arrival Information     Expected Arrival Acuity Means of Arrival Escorted By Service Admission Type    - 12/2/2018 19:36 Urgent Walk-In Self Surgery-General Urgent    Arrival Complaint    rectal bleeding, diverticulitis          Chief Complaint:   Chief Complaint   Patient presents with    Abdominal Pain     Pt  with complaints of abdominal pain and rectal bleeding for a few days, decreased appetite  Was diagnosed yesterday with diverticulitis at [facility], given po abx  States unable to eat or drink and severe pain  History of Illness: 16-year-old male, seen with his wife, who assisted with providing both acute and chronic medical history  Patient has a notable history of diverticulitis, which was diagnosed at [facility] yesterday, during the day  The patient was seen at [facility] due to left lower quadrant abdominal pain, which the patient states became acutely worse, immediately prior to his arrival at [facility]    The patient, in addition to this, was having moderate rectal bleeding, for which the patient states was primary concern, upon arrival   The patient further states that, since this began, the pain initially had gotten much better, but, after eating at home, has abdominal distention became notably worse, and the patient's abdominal pain, which described as sharp and shooting, notably exacerbated  The patient states that, currently, his abdominal pain is worse with moving, coughing, eating, drinking, or any direct pressure to his abdomen  Furthermore, the patient's wife states that his the abdomen is notably more distended than it was previously, with the patient's wife stating that his abdomen is easily twice as large normally is    ED Vital Signs:   ED Triage Vitals [12/02/18 1952]   Temperature Pulse Respirations Blood Pressure SpO2   98 4 °F (36 9 °C) 94 16 146/79 97 %      Temp Source Heart Rate Source Patient Position - Orthostatic VS BP Location FiO2 (%)   Oral Monitor Sitting Left arm --      Pain Score       Worst Possible Pain        Wt Readings from Last 1 Encounters:   12/03/18 93 kg (205 lb 0 4 oz)       Vital Signs (abnormal): maximum pulse 106    Abnormal Labs/Diagnostic Test Results:   Wbc 12, hgb 11 5, hct 34 7  Na 135  K 3 3  Cl 98  Calcium 8 1  Total protein 5 8  Albumin 2 1  Phosphorous 2 3  CxR - Minimal bibasilar atelectasis/volume loss  New oval density overlying the lower thoracic spine, of uncertain etiology    Ct abdomen - Findings suggestive of colitis,   C  difficile should be excluded  Jayde Flax is more gaseous distention of the colon when compared to the prior study, suggestinga component of colonic ileus  Jayde Flax is no small bowel obstruction  The distal tip of the appendix appears somewhat inflamed, however, this is likely due to adjacent free fluid   This is similar in appearance to December 1, 2018   If there is clinical concern for tip appendicitis, follow-up would be suggested  There is an approximately 7 x 4 mm pulmonary nodule at the left lung base   Nonemergent outpatient CT of the chest is recommended for further evaluation   Please see discussion    Mild bibasilar atelectasis  Fatty infiltration of the liver    0620 - wbc 10 79, hgb 11, hct 32 4  Calcium 7 7  CRP >90 0    ED Treatment:   Medication Administration from 12/02/2018 1936 to 12/03/2018 0321       Date/Time Order Dose Route Action Comments     12/02/2018 2321 ketamine (KETALAR) 50 mg/mL 50 mg 50 mg Intramuscular Given Given to [employer] for administration  Alvaro is giving the medication via IV in 2 25mg boluses     12/02/2018 2226 HYDROmorphone (DILAUDID) injection 0 5 mg 0 5 mg Intravenous Given      12/02/2018 2333 benzocaine (HURRICAINE) 20 % mucosal spray 2 spray 2 spray Mucosal Given Sprayed into R nares     12/03/2018 0222 ciprofloxacin (CIPRO) IVPB (premix) 400 mg 0 mg Intravenous Stopped      12/03/2018 0122 ciprofloxacin (CIPRO) IVPB (premix) 400 mg 400 mg Intravenous New Bag      12/03/2018 0057 metroNIDAZOLE (FLAGYL) IVPB (premix) 500 mg 0 mg Intravenous Stopped      12/03/2018 0027 metroNIDAZOLE (FLAGYL) IVPB (premix) 500 mg 500 mg Intravenous New Bag      12/03/2018 0207 iohexol (OMNIPAQUE) 350 MG/ML injection (MULTI-DOSE) 100 mL 100 mL Intravenous Given           Past Medical/Surgical History:   Past Medical History:   Diagnosis Date    Hypertension        Admitting Diagnosis: Abdominal pain [R10 9]    Age/Sex: 46 y o  male    Assessment/Plan:  THis is a 46year old male admitted from home with history of hypertension, gout and impaired fasting glucose  who presented to ED with abdominal pain and BRBPR  CT showed colitis+/- colonic ileus with mildly inflamed distal appendix  Plan is to admit inpatient with colitis with acute abdominal pain/ Ileus and  lower GI bleed  Plan is NGT to suction, consult GI, stool studies, IV antibiotics, IVF and pain control   Monitor labs    Admission Orders:  12/3/2018  0236 INPATIENT   Scheduled Meds:   Current Facility-Administered Medications:  acetaminophen 650 mg Oral Q6H PRN    ampicillin-sulbactam 3 g Intravenous Q6H Last Rate: 3 g (12/03/18 1113) docusate sodium 100 mg Oral BID PRN    HYDROmorphone 0 5 mg Intravenous Q4H PRN    influenza vaccine 0 5 mL Intramuscular Prior to discharge    losartan 50 mg Oral Daily    metroNIDAZOLE 500 mg Intravenous Q8H Last Rate: 500 mg (12/03/18 0738)   nicotine 1 patch Transdermal Daily    ondansetron 4 mg Intravenous Q4H PRN    oxyCODONE-acetaminophen 1 tablet Oral Q4H PRN    potassium chloride 10 mEq Oral BID    sodium chloride 125 mL/hr Intravenous Continuous Last Rate: 125 mL/hr (12/03/18 0412)     Continuous Infusions:   sodium chloride 125 mL/hr Last Rate: 125 mL/hr (12/03/18 0412)     PRN Meds:     HYDROmorphone 0;5 iv - used x 2 thus far (0416;  1117)    Up as tolerated   scds  NGT  Stool enteric bacterial panel  Clostridium difficile  Consult GI    Per GI - Colitis extending from the ascending colon to the rectum with significant colonic distention concerning for colonic ileus versus obstruction  He has been experiencing intermittent but increasing rectal bleeding over the last month  Clinical picture appears suspicious for ulcerative colitis or possibly C diff infection, both of which can result in megacolon      - follow-up on findings of the obstruction series; confirm NG tube placement also  - NPO status, close surgery follow-up  - continue IV antibiotics  - follow-up on stool C diff and enteric panel  - check fecal calprotectin, inflammatory markers including sed rate and CRP    - if stool studies are negative for evidence of infectious process, may need to consider treatment with steroids  For now though, will continue with broad spectrum antibiotics    Depending on his clinical course, it would generally be preferable to pursue at least a flexible sigmoidoscopy to assess for apparent changes of ulcerative colitis to assure diagnosis, although currently with his colonic distention he would be at increased risk with insufflation from the procedure          [address] Utilization Review Department  Phone: [phone]; Fax [phone]  [email or web address]  ATTENTION: Please call with any questions or concerns to [phone]  and carefully listen to the prompts so that you are directed to the right person  Send all requests for admission clinical reviews, approved or denied determinations and any other requests to fax [phone]   All voicemails are confidential

## 2018-12-03 NOTE — NURSING NOTE
Pt came up from ED with NG tube and no suction orders  Tried paging admitting surgical PA with no call back as of yet  I called down to the ED to see if she was still down there but she wasn't  Will try to page again if no call back soon

## 2018-12-04 ENCOUNTER — ANESTHESIA EVENT (INPATIENT)
Dept: GASTROENTEROLOGY | Facility: HOSPITAL | Age: 53
DRG: 394 | End: 2018-12-04
Payer: COMMERCIAL

## 2018-12-04 ENCOUNTER — ANESTHESIA (INPATIENT)
Dept: GASTROENTEROLOGY | Facility: HOSPITAL | Age: 53
DRG: 394 | End: 2018-12-04
Payer: COMMERCIAL

## 2018-12-04 PROBLEM — R14.0 ABDOMINAL DISTENSION: Status: ACTIVE | Noted: 2018-12-02

## 2018-12-04 PROBLEM — K52.9 COLITIS: Status: ACTIVE | Noted: 2018-12-02

## 2018-12-04 LAB
ANION GAP SERPL CALCULATED.3IONS-SCNC: 5 MMOL/L (ref 4–13)
ANISOCYTOSIS BLD QL SMEAR: PRESENT
BASOPHILS # BLD MANUAL: 0 THOUSAND/UL (ref 0–0.1)
BASOPHILS NFR MAR MANUAL: 0 % (ref 0–1)
BUN SERPL-MCNC: 11 MG/DL (ref 5–25)
CALCIUM SERPL-MCNC: 7.6 MG/DL (ref 8.3–10.1)
CAMPYLOBACTER DNA SPEC NAA+PROBE: NORMAL
CHLORIDE SERPL-SCNC: 104 MMOL/L (ref 100–108)
CO2 SERPL-SCNC: 28 MMOL/L (ref 21–32)
CREAT SERPL-MCNC: 0.82 MG/DL (ref 0.6–1.3)
EOSINOPHIL # BLD MANUAL: 1.16 THOUSAND/UL (ref 0–0.4)
EOSINOPHIL NFR BLD MANUAL: 11 % (ref 0–6)
ERYTHROCYTE [DISTWIDTH] IN BLOOD BY AUTOMATED COUNT: 13.3 % (ref 11.6–15.1)
GFR SERPL CREATININE-BSD FRML MDRD: 100 ML/MIN/1.73SQ M
GLUCOSE SERPL-MCNC: 135 MG/DL (ref 65–140)
HCT VFR BLD AUTO: 30 % (ref 36.5–49.3)
HGB BLD-MCNC: 10 G/DL (ref 12–17)
LYMPHOCYTES # BLD AUTO: 1.89 THOUSAND/UL (ref 0.6–4.47)
LYMPHOCYTES # BLD AUTO: 18 % (ref 14–44)
MCH RBC QN AUTO: 30.7 PG (ref 26.8–34.3)
MCHC RBC AUTO-ENTMCNC: 33.3 G/DL (ref 31.4–37.4)
MCV RBC AUTO: 92 FL (ref 82–98)
MONOCYTES # BLD AUTO: 0.84 THOUSAND/UL (ref 0–1.22)
MONOCYTES NFR BLD: 8 % (ref 4–12)
NEUTROPHILS # BLD MANUAL: 6.63 THOUSAND/UL (ref 1.85–7.62)
NEUTS BAND NFR BLD MANUAL: 12 % (ref 0–8)
NEUTS SEG NFR BLD AUTO: 51 % (ref 43–75)
NRBC BLD AUTO-RTO: 0 /100 WBCS
PLATELET # BLD AUTO: 446 THOUSANDS/UL (ref 149–390)
PLATELET BLD QL SMEAR: ABNORMAL
PMV BLD AUTO: 8.1 FL (ref 8.9–12.7)
POLYCHROMASIA BLD QL SMEAR: PRESENT
POTASSIUM SERPL-SCNC: 3.7 MMOL/L (ref 3.5–5.3)
RBC # BLD AUTO: 3.26 MILLION/UL (ref 3.88–5.62)
SALMONELLA DNA SPEC QL NAA+PROBE: NORMAL
SHIGA TOXIN STX GENE SPEC NAA+PROBE: NORMAL
SHIGELLA DNA SPEC QL NAA+PROBE: NORMAL
SODIUM SERPL-SCNC: 137 MMOL/L (ref 136–145)
TOTAL CELLS COUNTED SPEC: 100
WBC # BLD AUTO: 10.52 THOUSAND/UL (ref 4.31–10.16)

## 2018-12-04 PROCEDURE — 88305 TISSUE EXAM BY PATHOLOGIST: CPT | Performed by: PATHOLOGY

## 2018-12-04 PROCEDURE — 45380 COLONOSCOPY AND BIOPSY: CPT | Performed by: INTERNAL MEDICINE

## 2018-12-04 PROCEDURE — 85007 BL SMEAR W/DIFF WBC COUNT: CPT | Performed by: SURGERY

## 2018-12-04 PROCEDURE — 85027 COMPLETE CBC AUTOMATED: CPT | Performed by: SURGERY

## 2018-12-04 PROCEDURE — 0DBP8ZX EXCISION OF RECTUM, VIA NATURAL OR ARTIFICIAL OPENING ENDOSCOPIC, DIAGNOSTIC: ICD-10-PCS | Performed by: INTERNAL MEDICINE

## 2018-12-04 PROCEDURE — 80048 BASIC METABOLIC PNL TOTAL CA: CPT | Performed by: SURGERY

## 2018-12-04 PROCEDURE — 94762 N-INVAS EAR/PLS OXIMTRY CONT: CPT

## 2018-12-04 PROCEDURE — 94760 N-INVAS EAR/PLS OXIMETRY 1: CPT

## 2018-12-04 RX ORDER — LIDOCAINE HYDROCHLORIDE 10 MG/ML
INJECTION, SOLUTION INFILTRATION; PERINEURAL AS NEEDED
Status: DISCONTINUED | OUTPATIENT
Start: 2018-12-04 | End: 2018-12-04 | Stop reason: SURG

## 2018-12-04 RX ORDER — SODIUM CHLORIDE 9 MG/ML
INJECTION, SOLUTION INTRAVENOUS CONTINUOUS PRN
Status: DISCONTINUED | OUTPATIENT
Start: 2018-12-04 | End: 2018-12-04 | Stop reason: SURG

## 2018-12-04 RX ORDER — METHYLPREDNISOLONE SODIUM SUCCINATE 40 MG/ML
20 INJECTION, POWDER, LYOPHILIZED, FOR SOLUTION INTRAMUSCULAR; INTRAVENOUS EVERY 8 HOURS
Status: DISCONTINUED | OUTPATIENT
Start: 2018-12-04 | End: 2018-12-08

## 2018-12-04 RX ORDER — PROPOFOL 10 MG/ML
INJECTION, EMULSION INTRAVENOUS AS NEEDED
Status: DISCONTINUED | OUTPATIENT
Start: 2018-12-04 | End: 2018-12-04 | Stop reason: SURG

## 2018-12-04 RX ADMIN — SODIUM CHLORIDE 3 G: 9 INJECTION, SOLUTION INTRAVENOUS at 11:08

## 2018-12-04 RX ADMIN — HYDROMORPHONE HYDROCHLORIDE 0.5 MG: 1 INJECTION, SOLUTION INTRAMUSCULAR; INTRAVENOUS; SUBCUTANEOUS at 11:22

## 2018-12-04 RX ADMIN — PROPOFOL 50 MG: 10 INJECTION, EMULSION INTRAVENOUS at 16:21

## 2018-12-04 RX ADMIN — POTASSIUM CHLORIDE 10 MEQ: 750 TABLET, EXTENDED RELEASE ORAL at 09:07

## 2018-12-04 RX ADMIN — SODIUM CHLORIDE 3 G: 9 INJECTION, SOLUTION INTRAVENOUS at 23:15

## 2018-12-04 RX ADMIN — METRONIDAZOLE 500 MG: 500 INJECTION, SOLUTION INTRAVENOUS at 08:55

## 2018-12-04 RX ADMIN — HYDROMORPHONE HYDROCHLORIDE 0.5 MG: 1 INJECTION, SOLUTION INTRAMUSCULAR; INTRAVENOUS; SUBCUTANEOUS at 08:13

## 2018-12-04 RX ADMIN — LIDOCAINE HYDROCHLORIDE ANHYDROUS 50 MG: 10 INJECTION, SOLUTION INFILTRATION at 16:10

## 2018-12-04 RX ADMIN — METHYLPREDNISOLONE SODIUM SUCCINATE 20 MG: 40 INJECTION, POWDER, FOR SOLUTION INTRAMUSCULAR; INTRAVENOUS at 17:23

## 2018-12-04 RX ADMIN — SODIUM CHLORIDE 3 G: 9 INJECTION, SOLUTION INTRAVENOUS at 17:23

## 2018-12-04 RX ADMIN — PROPOFOL 100 MG: 10 INJECTION, EMULSION INTRAVENOUS at 16:10

## 2018-12-04 RX ADMIN — SODIUM CHLORIDE: 0.9 INJECTION, SOLUTION INTRAVENOUS at 16:00

## 2018-12-04 RX ADMIN — PROPOFOL 50 MG: 10 INJECTION, EMULSION INTRAVENOUS at 16:15

## 2018-12-04 RX ADMIN — POTASSIUM CHLORIDE 10 MEQ: 750 TABLET, EXTENDED RELEASE ORAL at 17:23

## 2018-12-04 RX ADMIN — HYDROMORPHONE HYDROCHLORIDE 0.5 MG: 1 INJECTION, SOLUTION INTRAMUSCULAR; INTRAVENOUS; SUBCUTANEOUS at 01:57

## 2018-12-04 RX ADMIN — METRONIDAZOLE 500 MG: 500 INJECTION, SOLUTION INTRAVENOUS at 17:23

## 2018-12-04 RX ADMIN — LOSARTAN POTASSIUM 50 MG: 50 TABLET, FILM COATED ORAL at 09:08

## 2018-12-04 RX ADMIN — OXYCODONE HYDROCHLORIDE AND ACETAMINOPHEN 1 TABLET: 5; 325 TABLET ORAL at 18:34

## 2018-12-04 RX ADMIN — SODIUM CHLORIDE 3 G: 9 INJECTION, SOLUTION INTRAVENOUS at 05:07

## 2018-12-04 NOTE — PROGRESS NOTES
Progress Note -Surgery PA  Ady Martino 46 y o  male MRN: [id number]  Unit/Bed#: -01 Encounter: [id number]      Subjective/Objective     Subjective: Pt very distended today  Stating lower abdominal pain is worse than yesterday  Had bloody BM this morning  Nausea but no vomiting  Stool studies negative       Objective:     /77 (BP Location: Left arm)   Pulse 80   Temp 98 6 °F (37 °C) (Oral)   Resp 18   Wt 98 2 kg (216 lb 6 4 oz)   SpO2 97%   BMI 31 96 kg/m²       Intake/Output Summary (Last 24 hours) at 12/04/18 0820  Last data filed at 12/03/18 1240   Gross per 24 hour   Intake          1058 33 ml   Output                0 ml   Net          1058 33 ml       Invasive Devices     Peripheral Intravenous Line            Peripheral IV 12/02/18 Right Antecubital 1 day                Physical Exam:  General appearance: alert and oriented, in no acute distress  Lungs: clear to auscultation bilaterally  Heart: regular rate and rhythm, S1, S2 normal, no murmur, click, rub or gallop  Abdomen: distended, bowel sounds +, diffuse tenderness to palpation      Current Facility-Administered Medications:     acetaminophen (TYLENOL) tablet 650 mg, 650 mg, Oral, Q6H PRN, Manju Staton    ampicillin-sulbactam (UNASYN) 3 g in sodium chloride 0 9 % 100 mL IVPB, 3 g, Intravenous, Q6H, Manju Staton, Last Rate: 200 mL/hr at 12/04/18 0507, 3 g at 12/04/18 0507    dextrose 5 % and sodium chloride 0 9 % with KCl 20 mEq/L infusion (premix), 125 mL/hr, Intravenous, Continuous, Jose Raul Maldonado DO, Last Rate: 125 mL/hr at 12/03/18 2326, 125 mL/hr at 12/03/18 2326    docusate sodium (COLACE) capsule 100 mg, 100 mg, Oral, BID PRN, Molinda Im    HYDROmorphone (DILAUDID) injection 0 5 mg, 0 5 mg, Intravenous, Q4H PRN, Molinda Im, 0 5 mg at 12/04/18 0813    losartan (COZAAR) tablet 50 mg, 50 mg, Oral, Daily, Zana Carrasquillo PA-C    metroNIDAZOLE (FLAGYL) IVPB (premix) 500 mg, 500 mg, Intravenous, Q8H, Molinda Im, Last Rate: 200 mL/hr at 12/03/18 2326, 500 mg at 12/03/18 2326    nicotine (NICODERM CQ) 7 mg/24hr TD 24 hr patch 1 patch, 1 patch, Transdermal, Daily, Manju Staton    ondansetron (ZOFRAN) injection 4 mg, 4 mg, Intravenous, Q4H PRN, Manju Staton    oxyCODONE-acetaminophen (PERCOCET) 5-325 mg per tablet 1 tablet, 1 tablet, Oral, Q4H PRN, Ricardo Double, 1 tablet at 12/03/18 1820    potassium chloride (K-DUR,KLOR-CON) CR tablet 10 mEq, 10 mEq, Oral, BID, Ena Ruano PA-C, 10 mEq at 12/03/18 1820              Lab, Imaging and other studies:  I have personally reviewed pertinent lab results  , CBC:   Lab Results   Component Value Date    WBC 10 52 (H) 12/04/2018    HGB 10 0 (L) 12/04/2018    HCT 30 0 (L) 12/04/2018    MCV 92 12/04/2018     (H) 12/04/2018    MCH 30 7 12/04/2018    MCHC 33 3 12/04/2018    RDW 13 3 12/04/2018    MPV 8 1 (L) 12/04/2018    NRBC 0 12/04/2018   , CMP:   Lab Results   Component Value Date    SODIUM 137 12/04/2018    K 3 7 12/04/2018     12/04/2018    CO2 28 12/04/2018    BUN 11 12/04/2018    CREATININE 0 82 12/04/2018    CALCIUM 7 6 (L) 12/04/2018    EGFR 100 12/04/2018     Labs in chart were reviewed  VTE Mechanical Prophylaxis: sequential compression device    Assessment:    46year old male presenting with colitis, lower GI bleed    Plan:    - flex sig with GI today  - encourage oob/ambulation  - monitor bowel movements  - monitor hgb- 10 from 11 yesterday  - NPO    Patient Active Problem List   Diagnosis    Essential hypertension    Gout    Impaired fasting glucose    Colitis with rectal bleeding    Generalized abdominal pain    Hypokalemia          This text is generated with voice recognition software  There may be translation, syntax,  or grammatical errors  If you have any questions, please contact the dictating provider      Kelly Smith PA-C

## 2018-12-04 NOTE — SOCIAL WORK
LOS 1  Patient is not a bundle  Patient is not a readmission    CM name and role reviewed  CM met with patient and spouse at bedside  Patient lives in a 2 story home with his wife and 23year old son  Patient is independent with his ADLs, no DME  Fills prescriptions at [facility]  Does not have an appointed POA and does not wish for information at this time  Patient is employed  Spouse will drive patient home  No discharge needs at this time  CM reviewed discharge planning process including the following: identifying caregivers at home, preference for d/c planning needs, availability of treatment team to discuss questions or concerns patient and/or family may have regarding diagnosis, plan of care, old or new medications and discharge planning   CM will continue to follow for care coordination and update assessment as necessary

## 2018-12-04 NOTE — PLAN OF CARE
Problem: DISCHARGE PLANNING  Goal: Discharge to home or other facility with appropriate resources  INTERVENTIONS:  - Identify barriers to discharge w/patient and caregiver  - Arrange for needed discharge resources and transportation as appropriate  - Identify discharge learning needs (meds, wound care, etc )  - Arrange for interpretive services to assist at discharge as needed  - Refer to Case Management Department for coordinating discharge planning if the patient needs post-hospital services based on physician/advanced practitioner order or complex needs related to functional status, cognitive ability, or social support system   Outcome: Progressing  LOS 1  Patient is not a bundle  Patient is not a readmission    CM name and role reviewed  CM met with patient and spouse at bedside  Patient lives in a 2 story home with his wife and 23year old son  Patient is independent with his ADLs, no DME  Fills prescriptions at [facility]  Does not have an appointed POA and does not wish for information at this time  Patient is employed  Spouse will drive patient home  No discharge needs at this time  CM reviewed discharge planning process including the following: identifying caregivers at home, preference for d/c planning needs, availability of treatment team to discuss questions or concerns patient and/or family may have regarding diagnosis, plan of care, old or new medications and discharge planning   CM will continue to follow for care coordination and update assessment as necessary

## 2018-12-04 NOTE — ANESTHESIA POSTPROCEDURE EVALUATION
Post-Op Assessment Note      CV Status:  Stable    Mental Status:  Alert and awake    Hydration Status:  Euvolemic    PONV Controlled:  Controlled    Airway Patency:  Patent    Post Op Vitals Reviewed: Yes          Staff: CRNA       Comments: vss sv nonobstructed uneventful          BP   128/77   Temp      Pulse  78   Resp 24   SpO2 98

## 2018-12-04 NOTE — OP NOTE
Colonoscopy Procedure Note    Procedure: Colonoscopy    Sedation: Monitored anesthesia care, check anesthesia records      ASA Class: 3    INDICATIONS:  Diffuse colitis, hematochezia  POST-OP DIAGNOSIS: See the impression below    Procedure Details     Prior colonoscopy: Less than 3 years ago  It is being repeated at an interval of less than 3 years because: This colonoscopy is being performed for a diagnostic indication    Informed consent was obtained for the procedure, including sedation  Risks of perforation, hemorrhage, adverse drug reaction and aspiration were discussed  The patient was placed in the left lateral decubitus position  Based on the pre-procedure assessment, including review of the patient's medical history, medications, allergies, and review of systems, he had been deemed to be an appropriate candidate for conscious sedation; he was therefore sedated with the medications listed below  The patient was monitored continuously with telemetry, pulse oximetry, blood pressure monitoring, and direct observations  A rectal examination was performed  The variable-stiffness pediatric colonoscope was inserted into the rectum and advanced under direct vision to the ascending colon  The quality of the colonic preparation was fair  A careful inspection was made as the colonoscope was withdrawn, including a retroflexed view of the rectum; findings and interventions are described below  Findings:  Hemorrhagic mucosa with Deep ulcers noted starting from rectum extending to the transverse colon, mucosa within the ascending colon appeared dusky, the scope was not further advanced into the cecum  Colon was decompressed on withdrawal   Several biopsies were taken from the rectum to assess for ulcerative colitis  Complications: None; patient tolerated the procedure well  Impression:    See above  Recommendations: Follow-up biopsy results    Stat surgical evaluation given dusky right-sided colonic mucosa  Would continue aggressive IV fluid resuscitation, IV antibiotics and Solu-Medrol 20 mg Q 8 hr for presumed diagnosis of ulcerative colitis  Keep patient NPO

## 2018-12-04 NOTE — ANESTHESIA PREPROCEDURE EVALUATION
Review of Systems/Medical History      No history of anesthetic complications     Cardiovascular  Negative cardio ROS Hypertension ,    Pulmonary  Smoker ex-smoker  ,        GI/Hepatic       Negative  ROS        Endo/Other  Negative endo/other ROS      GYN  Negative gynecology ROS          Hematology  Negative hematology ROS      Musculoskeletal       Neurology  Negative neurology ROS      Psychology   Negative psychology ROS              Physical Exam    Airway    Mallampati score: II  TM Distance: >3 FB  Neck ROM: full     Dental       Cardiovascular  Comment: Negative ROS,     Pulmonary      Other Findings        Anesthesia Plan  ASA Score- 3     Anesthesia Type- IV sedation with anesthesia with ASA Monitors  Additional Monitors:   Airway Plan:     Comment: IV sedation,  standard ASA monitors  Risks and benefits discussed with patient; patient consented and agrees to proceed  I saw and evaluated the patient  If seen with CRNA, we have discussed the anesthetic plan and I am in agreement that the plan is appropriate for the patient        Plan Factors-    Induction- intravenous  Postoperative Plan-     Informed Consent- Anesthetic plan and risks discussed with patient  I personally reviewed this patient with the CRNA  Discussed and agreed on the Anesthesia Plan with the CRNA  Junior Lee

## 2018-12-05 ENCOUNTER — APPOINTMENT (INPATIENT)
Dept: RADIOLOGY | Facility: HOSPITAL | Age: 53
DRG: 394 | End: 2018-12-05
Attending: INTERNAL MEDICINE
Payer: COMMERCIAL

## 2018-12-05 LAB
ANION GAP SERPL CALCULATED.3IONS-SCNC: 5 MMOL/L (ref 4–13)
BASOPHILS # BLD MANUAL: 0 THOUSAND/UL (ref 0–0.1)
BASOPHILS NFR MAR MANUAL: 0 % (ref 0–1)
BUN SERPL-MCNC: 15 MG/DL (ref 5–25)
CALCIUM SERPL-MCNC: 8 MG/DL (ref 8.3–10.1)
CHLORIDE SERPL-SCNC: 107 MMOL/L (ref 100–108)
CO2 SERPL-SCNC: 27 MMOL/L (ref 21–32)
CREAT SERPL-MCNC: 0.88 MG/DL (ref 0.6–1.3)
EOSINOPHIL # BLD MANUAL: 0 THOUSAND/UL (ref 0–0.4)
EOSINOPHIL NFR BLD MANUAL: 0 % (ref 0–6)
ERYTHROCYTE [DISTWIDTH] IN BLOOD BY AUTOMATED COUNT: 13.3 % (ref 11.6–15.1)
GFR SERPL CREATININE-BSD FRML MDRD: 97 ML/MIN/1.73SQ M
GLUCOSE SERPL-MCNC: 158 MG/DL (ref 65–140)
HCT VFR BLD AUTO: 33.2 % (ref 36.5–49.3)
HGB BLD-MCNC: 10.8 G/DL (ref 12–17)
LYMPHOCYTES # BLD AUTO: 1.01 THOUSAND/UL (ref 0.6–4.47)
LYMPHOCYTES # BLD AUTO: 10 % (ref 14–44)
MCH RBC QN AUTO: 30.4 PG (ref 26.8–34.3)
MCHC RBC AUTO-ENTMCNC: 32.5 G/DL (ref 31.4–37.4)
MCV RBC AUTO: 94 FL (ref 82–98)
MONOCYTES # BLD AUTO: 0.3 THOUSAND/UL (ref 0–1.22)
MONOCYTES NFR BLD: 3 % (ref 4–12)
NEUTROPHILS # BLD MANUAL: 8.31 THOUSAND/UL (ref 1.85–7.62)
NEUTS BAND NFR BLD MANUAL: 21 % (ref 0–8)
NEUTS SEG NFR BLD AUTO: 61 % (ref 43–75)
NRBC BLD AUTO-RTO: 0 /100 WBCS
PLATELET # BLD AUTO: 504 THOUSANDS/UL (ref 149–390)
PLATELET BLD QL SMEAR: ABNORMAL
PMV BLD AUTO: 8 FL (ref 8.9–12.7)
POLYCHROMASIA BLD QL SMEAR: PRESENT
POTASSIUM SERPL-SCNC: 4.2 MMOL/L (ref 3.5–5.3)
RBC # BLD AUTO: 3.55 MILLION/UL (ref 3.88–5.62)
SODIUM SERPL-SCNC: 139 MMOL/L (ref 136–145)
TOTAL CELLS COUNTED SPEC: 100
VARIANT LYMPHS # BLD AUTO: 5 %
WBC # BLD AUTO: 10.13 THOUSAND/UL (ref 4.31–10.16)

## 2018-12-05 PROCEDURE — 85007 BL SMEAR W/DIFF WBC COUNT: CPT | Performed by: PHYSICIAN ASSISTANT

## 2018-12-05 PROCEDURE — 74022 RADEX COMPL AQT ABD SERIES: CPT

## 2018-12-05 PROCEDURE — 99232 SBSQ HOSP IP/OBS MODERATE 35: CPT | Performed by: INTERNAL MEDICINE

## 2018-12-05 PROCEDURE — 85027 COMPLETE CBC AUTOMATED: CPT | Performed by: PHYSICIAN ASSISTANT

## 2018-12-05 PROCEDURE — 80048 BASIC METABOLIC PNL TOTAL CA: CPT | Performed by: PHYSICIAN ASSISTANT

## 2018-12-05 RX ADMIN — POTASSIUM CHLORIDE 10 MEQ: 750 TABLET, EXTENDED RELEASE ORAL at 09:14

## 2018-12-05 RX ADMIN — SODIUM CHLORIDE 3 G: 9 INJECTION, SOLUTION INTRAVENOUS at 11:03

## 2018-12-05 RX ADMIN — LOSARTAN POTASSIUM 50 MG: 50 TABLET, FILM COATED ORAL at 09:14

## 2018-12-05 RX ADMIN — METHYLPREDNISOLONE SODIUM SUCCINATE 20 MG: 40 INJECTION, POWDER, FOR SOLUTION INTRAMUSCULAR; INTRAVENOUS at 09:14

## 2018-12-05 RX ADMIN — METRONIDAZOLE 500 MG: 500 INJECTION, SOLUTION INTRAVENOUS at 01:35

## 2018-12-05 RX ADMIN — POTASSIUM CHLORIDE 10 MEQ: 750 TABLET, EXTENDED RELEASE ORAL at 17:41

## 2018-12-05 RX ADMIN — POTASSIUM CHLORIDE, DEXTROSE MONOHYDRATE AND SODIUM CHLORIDE 75 ML/HR: 150; 5; 900 INJECTION, SOLUTION INTRAVENOUS at 16:25

## 2018-12-05 RX ADMIN — METHYLPREDNISOLONE SODIUM SUCCINATE 20 MG: 40 INJECTION, POWDER, FOR SOLUTION INTRAMUSCULAR; INTRAVENOUS at 01:32

## 2018-12-05 RX ADMIN — SODIUM CHLORIDE 3 G: 9 INJECTION, SOLUTION INTRAVENOUS at 17:41

## 2018-12-05 RX ADMIN — METHYLPREDNISOLONE SODIUM SUCCINATE 20 MG: 40 INJECTION, POWDER, FOR SOLUTION INTRAMUSCULAR; INTRAVENOUS at 16:25

## 2018-12-05 RX ADMIN — SODIUM CHLORIDE 3 G: 9 INJECTION, SOLUTION INTRAVENOUS at 21:00

## 2018-12-05 RX ADMIN — POTASSIUM CHLORIDE, DEXTROSE MONOHYDRATE AND SODIUM CHLORIDE 125 ML/HR: 150; 5; 900 INJECTION, SOLUTION INTRAVENOUS at 04:21

## 2018-12-05 RX ADMIN — SODIUM CHLORIDE 3 G: 9 INJECTION, SOLUTION INTRAVENOUS at 04:21

## 2018-12-05 RX ADMIN — OXYCODONE HYDROCHLORIDE AND ACETAMINOPHEN 1 TABLET: 5; 325 TABLET ORAL at 16:25

## 2018-12-05 NOTE — PLAN OF CARE
Problem: Potential for Falls  Goal: Patient will remain free of falls  INTERVENTIONS:  - Assess patient frequently for physical needs  -  Identify cognitive and physical deficits and behaviors that affect risk of falls    -  [place] fall precautions as indicated by assessment   - Educate patient/family on patient safety including physical limitations  - Instruct patient to call for assistance with activity based on assessment  - Modify environment to reduce risk of injury  - Consider OT/PT consult to assist with strengthening/mobility   Outcome: Progressing      Problem: PAIN - ADULT  Goal: Verbalizes/displays adequate comfort level or baseline comfort level  Interventions:  - Encourage patient to monitor pain and request assistance  - Assess pain using appropriate pain scale  - Administer analgesics based on type and severity of pain and evaluate response  - Implement non-pharmacological measures as appropriate and evaluate response  - Consider cultural and social influences on pain and pain management  - Notify physician/advanced practitioner if interventions unsuccessful or patient reports new pain   Outcome: Progressing      Problem: INFECTION - ADULT  Goal: Absence or prevention of progression during hospitalization  INTERVENTIONS:  - Assess and monitor for signs and symptoms of infection  - Monitor lab/diagnostic results  - Monitor all insertion sites, i e  indwelling lines, tubes, and drains  - Monitor endotracheal (as able) and nasal secretions for changes in amount and color  - [place] appropriate cooling/warming therapies per order  - Administer medications as ordered  - Instruct and encourage patient and family to use good hand hygiene technique  - Identify and instruct in appropriate isolation precautions for identified infection/condition   Outcome: Progressing    Goal: Absence of fever/infection during neutropenic period  INTERVENTIONS:  - Monitor WBC  - Implement neutropenic guidelines   Outcome: Progressing      Problem: SAFETY ADULT  Goal: Maintain or return to baseline ADL function  INTERVENTIONS:  -  Assess patient's ability to carry out ADLs; assess patient's baseline for ADL function and identify physical deficits which impact ability to perform ADLs (bathing, care of mouth/teeth, toileting, grooming, dressing, etc )  - Assess/evaluate cause of self-care deficits   - Assess range of motion  - Assess patient's mobility; develop plan if impaired  - Assess patient's need for assistive devices and provide as appropriate  - Encourage maximum independence but intervene and supervise when necessary  ¯ Involve family in performance of ADLs  ¯ Assess for home care needs following discharge   ¯ Request OT consult to assist with ADL evaluation and planning for discharge  ¯ Provide patient education as appropriate   Outcome: Progressing    Goal: Maintain or return mobility status to optimal level  INTERVENTIONS:  - Assess patient's baseline mobility status (ambulation, transfers, stairs, etc )    - Identify cognitive and physical deficits and behaviors that affect mobility  - Identify mobility aids required to assist with transfers and/or ambulation (gait belt, sit-to-stand, lift, walker, cane, etc )  - [place] fall precautions as indicated by assessment  - Record patient progress and toleration of activity level on Mobility SBAR; progress patient to next Phase/Stage  - Instruct patient to call for assistance with activity based on assessment  - Request Rehabilitation consult to assist with strengthening/weightbearing, etc    Outcome: Progressing      Problem: Knowledge Deficit  Goal: Patient/family/caregiver demonstrates understanding of disease process, treatment plan, medications, and discharge instructions  Complete learning assessment and assess knowledge base    Interventions:  - Provide teaching at level of understanding  - Provide teaching via preferred learning methods   Outcome: Progressing      Problem: GASTROINTESTINAL - ADULT  Goal: Maintains or returns to baseline bowel function  INTERVENTIONS:  - Assess bowel function  - Encourage oral fluids to ensure adequate hydration  - Administer IV fluids as ordered to ensure adequate hydration  - Administer ordered medications as needed  - Encourage mobilization and activity  - Nutrition services referral to assist patient with appropriate food choices  Outcome: Progressing    Goal: Maintains adequate nutritional intake  INTERVENTIONS:  - Monitor percentage of each meal consumed  - Identify factors contributing to decreased intake, treat as appropriate  - Assist with meals as needed  - Monitor I&O, WT and lab values  - Obtain nutrition services referral as needed  Outcome: Progressing      Problem: METABOLIC, FLUID AND ELECTROLYTES - ADULT  Goal: Electrolytes maintained within normal limits  INTERVENTIONS:  - Monitor labs and assess patient for signs and symptoms of electrolyte imbalances  - Administer electrolyte replacement as ordered  - Monitor response to electrolyte replacements, including repeat lab results as appropriate  - Instruct patient on fluid and nutrition as appropriate  Outcome: Progressing

## 2018-12-05 NOTE — PROGRESS NOTES
Progress Note -Surgery PA  Kelvin Nunez 46 y o  male MRN: [id number]  Unit/Bed#: -01 Encounter: [id number]      Subjective/Objective     Subjective: Nothing acute overnight  Pt still having some bloody bowel movements  Overall believes his abdominal pain is slightly improved  Feels thirsty  Denied nausea, emesis, fevers or chills      Objective:     /63 (BP Location: Left arm)   Pulse 76   Temp 97 6 °F (36 4 °C) (Oral)   Resp 18   Ht 5' 7" (1 702 m)   Wt 98 5 kg (217 lb 3 2 oz)   SpO2 96%   BMI 34 02 kg/m²       Intake/Output Summary (Last 24 hours) at 12/05/18 7895  Last data filed at 12/05/18 0000   Gross per 24 hour   Intake          4597 92 ml   Output                0 ml   Net          4597 92 ml       Invasive Devices     Peripheral Intravenous Line            Peripheral IV 12/02/18 Right Antecubital 2 days    Peripheral IV 12/04/18 Left Forearm less than 1 day    Peripheral IV 12/04/18 Left Hand less than 1 day                Physical Exam:  Gen: NAD, A&O, Comfortable   Chest: Normal work of breathing, no resp distress  Abd: S, Mild distention, mild diffuse tenderness, no rebound or gaurding  Ext: No edema  Skin: warm, dry, intact        Current Facility-Administered Medications:     acetaminophen (TYLENOL) tablet 650 mg, 650 mg, Oral, Q6H PRN, Manju Staton    ampicillin-sulbactam (UNASYN) 3 g in sodium chloride 0 9 % 100 mL IVPB, 3 g, Intravenous, Q6H, Manju Staton, Last Rate: 200 mL/hr at 12/05/18 0421, 3 g at 12/05/18 0421    dextrose 5 % and sodium chloride 0 9 % with KCl 20 mEq/L infusion (premix), 125 mL/hr, Intravenous, Continuous, Jose Raul Maldonado DO, Last Rate: 125 mL/hr at 12/05/18 0421, 125 mL/hr at 12/05/18 0421    docusate sodium (COLACE) capsule 100 mg, 100 mg, Oral, BID PRN, Shelvia Cones    HYDROmorphone (DILAUDID) injection 0 5 mg, 0 5 mg, Intravenous, Q4H PRN, Shelvia Cones, 0 5 mg at 12/04/18 1122    losartan (COZAAR) tablet 50 mg, 50 mg, Oral, Daily, Kj Foster, [place], 50 mg at 12/04/18 0908    methylPREDNISolone sodium succinate (Solu-MEDROL) injection 20 mg, 20 mg, Intravenous, Q8H, Esther Pepper PA-C, 20 mg at 12/05/18 0132    metroNIDAZOLE (FLAGYL) IVPB (premix) 500 mg, 500 mg, Intravenous, Q8H, Elisa Living, Last Rate: 200 mL/hr at 12/05/18 0135, 500 mg at 12/05/18 0135    ondansetron (ZOFRAN) injection 4 mg, 4 mg, Intravenous, Q4H PRN, Elisa Living    oxyCODONE-acetaminophen (PERCOCET) 5-325 mg per tablet 1 tablet, 1 tablet, Oral, Q4H PRN, Elisa Living, 1 tablet at 12/04/18 1834    potassium chloride (K-DUR,KLOR-CON) CR tablet 10 mEq, 10 mEq, Oral, BID, Yandy Tinajero PA-C, 10 mEq at 12/04/18 1723              Lab, Imaging and other studies:  Recent Labs      12/03/18   0620  12/03/18   1214  12/04/18   0447  12/05/18   0635   WBC  10 79*   --   10 52*  10 13   HGB  11 0*   --   10 0*  10 8*   PLT  490*  475*  446*  504*     Recent Labs      12/03/18   0126  12/03/18   0620  12/04/18   0447  12/05/18   0635   K  3 3*  3 5  3 7  4 2   CL  98*  102  104  107   CO2  28  27  28  27   BUN  14  13  11  15   CREATININE  0 98  0 87  0 82  0 88   PHOS  2 3*   --    --    --    MG  1 6  1 8   --    --    CALCIUM  8 1*  7 7*  7 6*  8 0*             VTE Mechanical Prophylaxis: sequential compression device    Assessment:    46year old male presenting with colitis, lower GI bleed    Plan:    - CLD  - Continue steroids  - DC flagyl  - encourage oob/ambulation  - monitor bowel movements  - monitor hgb- Stable       Patient Active Problem List   Diagnosis    Essential hypertension    Gout    Impaired fasting glucose    Colitis with rectal bleeding    Generalized abdominal pain    Hypokalemia    Abdominal distension    Colitis          This text is generated with voice recognition software  There may be translation, syntax,  or grammatical errors  If you have any questions, please contact the dictating provider      Eliud Araiza MD

## 2018-12-05 NOTE — PROGRESS NOTES
Progress Note - Faviola Sandhu 46 y o  male MRN: [id number]    Unit/Bed#: -Young Encounter: [id number]        Subjective:   Patient says he did pass some bloody stool earlier today  He thinks he feels a little better than yesterday; still with generalized abdominal discomfort but slightly less  Objective:     Vitals: Blood pressure 139/78, pulse 77, temperature 98 1 °F (36 7 °C), temperature source Oral, resp  rate 18, height 5' 7" (1 702 m), weight 98 5 kg (217 lb 3 2 oz), SpO2 95 %  ,Body mass index is 34 02 kg/m²  Intake/Output Summary (Last 24 hours) at 12/05/18 1452  Last data filed at 12/05/18 0800   Gross per 24 hour   Intake          2978 75 ml   Output                0 ml   Net          2978 75 ml       Physical Exam:   General appearance: alert, appears stated age and cooperative  Lungs: clear to auscultation bilaterally, no labored breathing/accessory muscle use  Heart: regular rate and rhythm, S1, S2 normal, no murmur, click, rub or gallop  Abdomen: soft, obese, some generalized tenderness without guarding or peritoneal signs, non-tender; bowel sounds normal; no masses,  no organomegaly  Extremities: no edema    Invasive Devices     Peripheral Intravenous Line            Peripheral IV 12/02/18 Right Antecubital 2 days    Peripheral IV 12/04/18 Left Forearm less than 1 day    Peripheral IV 12/04/18 Left Hand less than 1 day                Lab, Imaging and other studies: I have personally reviewed pertinent reports      Admission on 12/02/2018   Component Date Value    WBC 12/03/2018 12 00*    RBC 12/03/2018 3 78*    Hemoglobin 12/03/2018 11 5*    Hematocrit 12/03/2018 34 7*    MCV 12/03/2018 92     MCH 12/03/2018 30 4     MCHC 12/03/2018 33 1     RDW 12/03/2018 12 8     MPV 12/03/2018 8 7*    Platelets 74/07/0096 558*    nRBC 12/03/2018 0     Sodium 12/03/2018 135*    Potassium 12/03/2018 3 3*    Chloride 12/03/2018 98*    CO2 12/03/2018 28     ANION GAP 12/03/2018 9     BUN 12/03/2018 14     Creatinine 12/03/2018 0 98     Glucose 12/03/2018 126     Calcium 12/03/2018 8 1*    AST 12/03/2018 14     ALT 12/03/2018 26     Alkaline Phosphatase 12/03/2018 70     Total Protein 12/03/2018 5 8*    Albumin 12/03/2018 2 1*    Total Bilirubin 12/03/2018 0 40     eGFR 12/03/2018 87     Total CK 12/03/2018 52     LACTIC ACID 12/03/2018 1 1     Lipase 12/03/2018 36*    Magnesium 12/03/2018 1 6     Phosphorus 12/03/2018 2 3*    Segmented % 12/03/2018 48     Bands % 12/03/2018 19*    Lymphocytes % 12/03/2018 12*    Monocytes % 12/03/2018 18*    Eosinophils, % 12/03/2018 2     Basophils % 12/03/2018 0     Atypical Lymphocytes % 12/03/2018 1*    Absolute Neutrophils 12/03/2018 8 04*    Lymphocytes Absolute 12/03/2018 1 44     Monocytes Absolute 12/03/2018 2 16*    Eosinophils Absolute 12/03/2018 0 24     Basophils Absolute 12/03/2018 0 00     Total Counted 12/03/2018 100     Polychromasia 12/03/2018 Present     Tear Drop Cells 12/03/2018 Present     Platelet Estimate 53/29/1820 Increased*    C difficile toxin by PCR 12/03/2018 NEGATIVE for C difficle toxin by PCR        Salmonella sp PCR 12/03/2018 None Detected     Shigella sp/Enteroinvasi* 12/03/2018 None Detected     Campylobacter sp (jejuni* 12/03/2018 None Detected     Shiga toxin 1/Shiga toxi* 12/03/2018 None Detected     Platelets 92/84/0739 475*    MPV 12/03/2018 8 0*    WBC 12/03/2018 10 79*    RBC 12/03/2018 3 59*    Hemoglobin 12/03/2018 11 0*    Hematocrit 12/03/2018 32 4*    MCV 12/03/2018 90     MCH 12/03/2018 30 6     MCHC 12/03/2018 34 0     RDW 12/03/2018 13 0     MPV 12/03/2018 8 0*    Platelets 11/45/2025 490*    nRBC 12/03/2018 0     Sodium 12/03/2018 137     Potassium 12/03/2018 3 5     Chloride 12/03/2018 102     CO2 12/03/2018 27     ANION GAP 12/03/2018 8     BUN 12/03/2018 13     Creatinine 12/03/2018 0 87     Glucose 12/03/2018 125     Calcium 12/03/2018 7 7*    eGFR 12/03/2018 98     Magnesium 12/03/2018 1 8     Segmented % 12/03/2018 61     Bands % 12/03/2018 5     Lymphocytes % 12/03/2018 16     Monocytes % 12/03/2018 17*    Eosinophils, % 12/03/2018 1     Basophils % 12/03/2018 0     Absolute Neutrophils 12/03/2018 7 12     Lymphocytes Absolute 12/03/2018 1 73     Monocytes Absolute 12/03/2018 1 83*    Eosinophils Absolute 12/03/2018 0 11     Basophils Absolute 12/03/2018 0 00     Total Counted 12/03/2018 100     Dohle Bodies 12/03/2018 Present     Toxic Granulation 12/03/2018 Present     Polychromasia 12/03/2018 Present     Platelet Estimate 98/06/7255 Increased*    CRP 12/03/2018 >90 0*    Sed Rate 12/03/2018 38*    Ventricular Rate 12/03/2018 80     Atrial Rate 12/03/2018 80     [place] Interval 12/03/2018 126     QRSD Interval 12/03/2018 96     QT Interval 12/03/2018 348     QTC Interval 12/03/2018 401     P Axis 12/03/2018 25     QRS Axis 12/03/2018 35     T Wave [place] 12/03/2018 57     WBC 12/04/2018 10 52*    RBC 12/04/2018 3 26*    Hemoglobin 12/04/2018 10 0*    Hematocrit 12/04/2018 30 0*    MCV 12/04/2018 92     MCH 12/04/2018 30 7     MCHC 12/04/2018 33 3     RDW 12/04/2018 13 3     MPV 12/04/2018 8 1*    Platelets 88/74/9594 446*    nRBC 12/04/2018 0     Sodium 12/04/2018 137     Potassium 12/04/2018 3 7     Chloride 12/04/2018 104     CO2 12/04/2018 28     ANION GAP 12/04/2018 5     BUN 12/04/2018 11     Creatinine 12/04/2018 0 82     Glucose 12/04/2018 135     Calcium 12/04/2018 7 6*    eGFR 12/04/2018 100     Segmented % 12/04/2018 51     Bands % 12/04/2018 12*    Lymphocytes % 12/04/2018 18     Monocytes % 12/04/2018 8     Eosinophils, % 12/04/2018 11*    Basophils % 12/04/2018 0     Absolute Neutrophils 12/04/2018 6 63     Lymphocytes Absolute 12/04/2018 1 89     Monocytes Absolute 12/04/2018 0 84     Eosinophils Absolute 12/04/2018 1 16*    Basophils Absolute 12/04/2018 0 00     Total Counted 12/04/2018 100     Anisocytosis 12/04/2018 Present     Polychromasia 12/04/2018 Present     Platelet Estimate 47/46/4820 Increased*    WBC 12/05/2018 10 13     RBC 12/05/2018 3 55*    Hemoglobin 12/05/2018 10 8*    Hematocrit 12/05/2018 33 2*    MCV 12/05/2018 94     MCH 12/05/2018 30 4     MCHC 12/05/2018 32 5     RDW 12/05/2018 13 3     MPV 12/05/2018 8 0*    Platelets 94/00/8613 504*    nRBC 12/05/2018 0     Sodium 12/05/2018 139     Potassium 12/05/2018 4 2     Chloride 12/05/2018 107     CO2 12/05/2018 27     ANION GAP 12/05/2018 5     BUN 12/05/2018 15     Creatinine 12/05/2018 0 88     Glucose 12/05/2018 158*    Calcium 12/05/2018 8 0*    eGFR 12/05/2018 97     Segmented % 12/05/2018 61     Bands % 12/05/2018 21*    Lymphocytes % 12/05/2018 10*    Monocytes % 12/05/2018 3*    Eosinophils, % 12/05/2018 0     Basophils % 12/05/2018 0     Atypical Lymphocytes % 12/05/2018 5*    Absolute Neutrophils 12/05/2018 8 31*    Lymphocytes Absolute 12/05/2018 1 01     Monocytes Absolute 12/05/2018 0 30     Eosinophils Absolute 12/05/2018 0 00     Basophils Absolute 12/05/2018 0 00     Total Counted 12/05/2018 100     Polychromasia 12/05/2018 Present     Platelet Estimate 29/46/0304 Increased*     Results for Toby Rivera (MRN [id number]) as of 12/5/2018 14:52   Ref   Range 12/3/2018 06:20 12/3/2018 10:37 12/3/2018 12:14 12/4/2018 04:47 12/4/2018 16:19 12/5/2018 06:35   eGFR Latest Units: ml/min/1 73sq m 98   100  97   Sodium Latest Ref Range: 136 - 145 mmol/L 137   137  139   Potassium Latest Ref Range: 3 5 - 5 3 mmol/L 3 5   3 7  4 2   Chloride Latest Ref Range: 100 - 108 mmol/L 102   104  107   CO2 Latest Ref Range: 21 - 32 mmol/L 27   28  27   Anion Gap Latest Ref Range: 4 - 13 mmol/L 8   5  5   BUN Latest Ref Range: 5 - 25 mg/dL 13   11  15   Creatinine Latest Ref Range: 0 60 - 1 30 mg/dL 0 87   0 82  0 88   Glucose, Random Latest Ref Range: 65 - 140 mg/dL 125   135  158 (H)   Calcium Latest Ref Range: 8 3 - 10 1 mg/dL 7 7 (L)   7 6 (L)  8 0 (L)   Magnesium Latest Ref Range: 1 6 - 2 6 mg/dL 1 8        WBC Latest Ref Range: 4 31 - 10 16 Thousand/uL 10 79 (H)   10 52 (H)  10 13   Red Blood Cell Count Latest Ref Range: 3 88 - 5 62 Million/uL 3 59 (L)   3 26 (L)  3 55 (L)   Hemoglobin Latest Ref Range: 12 0 - 17 0 g/dL 11 0 (L)   10 0 (L)  10 8 (L)   HCT Latest Ref Range: 36 5 - 49 3 % 32 4 (L)   30 0 (L)  33 2 (L)   MCV Latest Ref Range: 82 - 98 fL 90   92  94   MCH Latest Ref Range: 26 8 - 34 3 pg 30 6   30 7  30 4   MCHC Latest Ref Range: 31 4 - 37 4 g/dL 34 0   33 3  32 5   RDW Latest Ref Range: 11 6 - 15 1 % 13 0   13 3  13 3   Platelet Count Latest Ref Range: 149 - 390 Thousands/uL 490 (H)  475 (H) 446 (H)  504 (H)   MPV Latest Ref Range: 8 9 - 12 7 fL 8 0 (L)  8 0 (L) 8 1 (L)  8 0 (L)   Platelet Estimate Latest Ref Range: Adequate  Increased (A)   Increased (A)  Increased (A)   nRBC Latest Units: /100 WBCs 0   0  0   Segs Relative Latest Ref Range: 43 - 75 % 61   51  61   Abs Neutrophils Latest Ref Range: 1 85 - 7 62 Thousand/uL 7 12   6 63  8 31 (H)   Bands Relative Latest Ref Range: 0 - 8 % 5   12 (H)  21 (H)   Lymphocytes % Latest Ref Range: 14 - 44 % 16   18  10 (L)   Monocytes Latest Ref Range: 4 - 12 % 17 (H)   8  3 (L)   Eosinophils Latest Ref Range: 0 - 6 % 1   11 (H)  0   Basophils Relative Latest Ref Range: 0 - 1 % 0   0  0   Atypical Lymphocytes % Latest Ref Range: <=0 %      5 (H)   Absolute Eosinophils Latest Ref Range: 0 00 - 0 40 Thousand/uL 0 11   1 16 (H)  0 00   Basophils Absolute Latest Ref Range: 0 00 - 0 10 Thousand/uL 0 00   0 00  0 00   Total Counted Unknown 100   100  100   Polychromasia Unknown Present   Present  Present   Dohle Bodies Unknown Present        Toxic Granulation Unknown Present        Anisocytosis Unknown    Present     Sed Rate Latest Ref Range: 0 - 10 mm/hour   38 (H)      TISSUE EXAM Unknown     Rpt ((NONE))    C-REACTIVE PROTEIN Latest Ref Range: <3 0 mg/L >90 0 (H)        LYMPHOCYTES ABSOLUTE Latest Ref Range: 0 60 - 4 47 Thousand/uL 1 73   1 89  1 01   Monocytes Absolute Latest Ref Range: 0 00 - 1 22 Thousand/uL 1 83 (H)   0 84  0 30   XR ABDOMEN OBSTRUCTION SERIES Unknown  Rpt           Colonoscopy, yesterday, Dr Seema Stapleton  Findings:  Hemorrhagic mucosa with Deep ulcers noted starting from rectum extending to the transverse colon, mucosa within the ascending colon appeared dusky, the scope was not further advanced into the cecum  Colon was decompressed on withdrawal   Several biopsies were taken from the rectum to assess for ulcerative colitis  Complications: None; patient tolerated the procedure well  Impression:    See above  Recommendations: Follow-up biopsy results  Stat surgical evaluation given dusky right-sided colonic mucosa  Would continue aggressive IV fluid resuscitation, IV antibiotics and Solu-Medrol 20 mg Q 8 hr for presumed diagnosis of ulcerative colitis  Keep patient NPO  Assessment/Plan:    1  Pancolitis with abdominal distention, colonic ileus and rectal bleeding, with findings on colonoscopy yesterday including deep ulcers extending from the rectum to the transverse colon, with dusky mucosa of the ascending colon  Findings and clinical presentation are most consistent with ulcerative pancolitis with megacolon  Decompression was performed during procedure yesterday      - Continue IV steroids; 20 mg Solumedrol IV q 8 hours  - Monitor vital signs, monitor exam closely for peritoneal signs, increased abdominal distention, absence of flatus/BM; recommend close surgery followup  - Bowel rest; diet as per surgery  - Continue IV antibiotics as well  - Hopefully patient will improve with IV steroid treatment; if patient's abdominal exam improves and patient begins to tolerate dietary advancement, then we can consider tapering to oral steroids and planning outpatient followup including initiation of maintenance therapy

## 2018-12-06 LAB
ANION GAP SERPL CALCULATED.3IONS-SCNC: 8 MMOL/L (ref 4–13)
BASOPHILS # BLD MANUAL: 0 THOUSAND/UL (ref 0–0.1)
BASOPHILS NFR MAR MANUAL: 0 % (ref 0–1)
BUN SERPL-MCNC: 17 MG/DL (ref 5–25)
CALCIUM SERPL-MCNC: 7.9 MG/DL (ref 8.3–10.1)
CALPROTECTIN STL-MCNT: 486 UG/G (ref 0–120)
CHLORIDE SERPL-SCNC: 105 MMOL/L (ref 100–108)
CO2 SERPL-SCNC: 26 MMOL/L (ref 21–32)
CREAT SERPL-MCNC: 0.8 MG/DL (ref 0.6–1.3)
EOSINOPHIL # BLD MANUAL: 0 THOUSAND/UL (ref 0–0.4)
EOSINOPHIL NFR BLD MANUAL: 0 % (ref 0–6)
ERYTHROCYTE [DISTWIDTH] IN BLOOD BY AUTOMATED COUNT: 13.5 % (ref 11.6–15.1)
GFR SERPL CREATININE-BSD FRML MDRD: 101 ML/MIN/1.73SQ M
GLUCOSE SERPL-MCNC: 144 MG/DL (ref 65–140)
HCT VFR BLD AUTO: 32 % (ref 36.5–49.3)
HGB BLD-MCNC: 10.7 G/DL (ref 12–17)
LYMPHOCYTES # BLD AUTO: 1.64 THOUSAND/UL (ref 0.6–4.47)
LYMPHOCYTES # BLD AUTO: 11 % (ref 14–44)
MCH RBC QN AUTO: 30.9 PG (ref 26.8–34.3)
MCHC RBC AUTO-ENTMCNC: 33.4 G/DL (ref 31.4–37.4)
MCV RBC AUTO: 93 FL (ref 82–98)
METAMYELOCYTES NFR BLD MANUAL: 1 % (ref 0–1)
MONOCYTES # BLD AUTO: 1.93 THOUSAND/UL (ref 0–1.22)
MONOCYTES NFR BLD: 13 % (ref 4–12)
NEUTROPHILS # BLD MANUAL: 10.86 THOUSAND/UL (ref 1.85–7.62)
NEUTS BAND NFR BLD MANUAL: 12 % (ref 0–8)
NEUTS SEG NFR BLD AUTO: 61 % (ref 43–75)
NRBC BLD AUTO-RTO: 0 /100 WBCS
NRBC BLD AUTO-RTO: 2 /100 WBC (ref 0–2)
PLATELET # BLD AUTO: 565 THOUSANDS/UL (ref 149–390)
PLATELET BLD QL SMEAR: ABNORMAL
PMV BLD AUTO: 8.2 FL (ref 8.9–12.7)
POLYCHROMASIA BLD QL SMEAR: PRESENT
POTASSIUM SERPL-SCNC: 3.9 MMOL/L (ref 3.5–5.3)
RBC # BLD AUTO: 3.46 MILLION/UL (ref 3.88–5.62)
SODIUM SERPL-SCNC: 139 MMOL/L (ref 136–145)
TOTAL CELLS COUNTED SPEC: 100
VARIANT LYMPHS # BLD AUTO: 2 %
WBC # BLD AUTO: 14.88 THOUSAND/UL (ref 4.31–10.16)

## 2018-12-06 PROCEDURE — 85007 BL SMEAR W/DIFF WBC COUNT: CPT | Performed by: SURGERY

## 2018-12-06 PROCEDURE — 85027 COMPLETE CBC AUTOMATED: CPT | Performed by: SURGERY

## 2018-12-06 PROCEDURE — 80048 BASIC METABOLIC PNL TOTAL CA: CPT | Performed by: SURGERY

## 2018-12-06 PROCEDURE — 99232 SBSQ HOSP IP/OBS MODERATE 35: CPT | Performed by: INTERNAL MEDICINE

## 2018-12-06 RX ADMIN — SODIUM CHLORIDE 3 G: 9 INJECTION, SOLUTION INTRAVENOUS at 17:03

## 2018-12-06 RX ADMIN — POTASSIUM CHLORIDE, DEXTROSE MONOHYDRATE AND SODIUM CHLORIDE 75 ML/HR: 150; 5; 900 INJECTION, SOLUTION INTRAVENOUS at 22:24

## 2018-12-06 RX ADMIN — METHYLPREDNISOLONE SODIUM SUCCINATE 20 MG: 40 INJECTION, POWDER, FOR SOLUTION INTRAMUSCULAR; INTRAVENOUS at 08:51

## 2018-12-06 RX ADMIN — POTASSIUM CHLORIDE 10 MEQ: 750 TABLET, EXTENDED RELEASE ORAL at 17:03

## 2018-12-06 RX ADMIN — POTASSIUM CHLORIDE 10 MEQ: 750 TABLET, EXTENDED RELEASE ORAL at 08:47

## 2018-12-06 RX ADMIN — METHYLPREDNISOLONE SODIUM SUCCINATE 20 MG: 40 INJECTION, POWDER, FOR SOLUTION INTRAMUSCULAR; INTRAVENOUS at 17:03

## 2018-12-06 RX ADMIN — SODIUM CHLORIDE 3 G: 9 INJECTION, SOLUTION INTRAVENOUS at 22:24

## 2018-12-06 RX ADMIN — METHYLPREDNISOLONE SODIUM SUCCINATE 20 MG: 40 INJECTION, POWDER, FOR SOLUTION INTRAMUSCULAR; INTRAVENOUS at 23:39

## 2018-12-06 RX ADMIN — SODIUM CHLORIDE 3 G: 9 INJECTION, SOLUTION INTRAVENOUS at 09:25

## 2018-12-06 RX ADMIN — METHYLPREDNISOLONE SODIUM SUCCINATE 20 MG: 40 INJECTION, POWDER, FOR SOLUTION INTRAMUSCULAR; INTRAVENOUS at 00:14

## 2018-12-06 RX ADMIN — LOSARTAN POTASSIUM 50 MG: 50 TABLET, FILM COATED ORAL at 08:47

## 2018-12-06 RX ADMIN — SODIUM CHLORIDE 3 G: 9 INJECTION, SOLUTION INTRAVENOUS at 03:18

## 2018-12-06 NOTE — PROGRESS NOTES
Progress Note - Ady Martino 46 y o  male MRN: [id number]    Unit/Bed#: -Young Encounter: [id number]        Subjective:   Patient reports feeling better today, he says he had 4 bowel movements since last night, and noted 1 did not appear to have any blood in it  He says he has been passing gas  He feels like his abdomen is a little softer today  Objective:     Vitals: Blood pressure 135/84, pulse 72, temperature 97 9 °F (36 6 °C), temperature source Oral, resp  rate 18, height 5' 7" (1 702 m), weight 100 kg (220 lb 12 8 oz), SpO2 97 %  ,Body mass index is 34 58 kg/m²  Intake/Output Summary (Last 24 hours) at 12/06/18 1247  Last data filed at 12/06/18 0000   Gross per 24 hour   Intake             1000 ml   Output                0 ml   Net             1000 ml       Physical Exam:   General appearance: alert, appears stated age and cooperative  Lungs: clear to auscultation bilaterally, no labored breathing/accessory muscle use  Heart: regular rate and rhythm, S1, S2 normal, no murmur, click, rub or gallop  Abdomen: soft, distended with generalized tenderness although appears less pronounced than yesterday; bowel sounds normal; no masses,  no organomegaly  Extremities: no edema    Invasive Devices     Peripheral Intravenous Line            Peripheral IV 12/02/18 Right Antecubital 3 days    Peripheral IV 12/04/18 Left Forearm 1 day                Lab, Imaging and other studies: I have personally reviewed pertinent reports      Admission on 12/02/2018   Component Date Value    WBC 12/03/2018 12 00*    RBC 12/03/2018 3 78*    Hemoglobin 12/03/2018 11 5*    Hematocrit 12/03/2018 34 7*    MCV 12/03/2018 92     MCH 12/03/2018 30 4     MCHC 12/03/2018 33 1     RDW 12/03/2018 12 8     MPV 12/03/2018 8 7*    Platelets 20/55/9056 558*    nRBC 12/03/2018 0     Sodium 12/03/2018 135*    Potassium 12/03/2018 3 3*    Chloride 12/03/2018 98*    CO2 12/03/2018 28     ANION GAP 12/03/2018 9     BUN 12/03/2018 14     Creatinine 12/03/2018 0 98     Glucose 12/03/2018 126     Calcium 12/03/2018 8 1*    AST 12/03/2018 14     ALT 12/03/2018 26     Alkaline Phosphatase 12/03/2018 70     Total Protein 12/03/2018 5 8*    Albumin 12/03/2018 2 1*    Total Bilirubin 12/03/2018 0 40     eGFR 12/03/2018 87     Total CK 12/03/2018 52     LACTIC ACID 12/03/2018 1 1     Lipase 12/03/2018 36*    Magnesium 12/03/2018 1 6     Phosphorus 12/03/2018 2 3*    Segmented % 12/03/2018 48     Bands % 12/03/2018 19*    Lymphocytes % 12/03/2018 12*    Monocytes % 12/03/2018 18*    Eosinophils, % 12/03/2018 2     Basophils % 12/03/2018 0     Atypical Lymphocytes % 12/03/2018 1*    Absolute Neutrophils 12/03/2018 8 04*    Lymphocytes Absolute 12/03/2018 1 44     Monocytes Absolute 12/03/2018 2 16*    Eosinophils Absolute 12/03/2018 0 24     Basophils Absolute 12/03/2018 0 00     Total Counted 12/03/2018 100     Polychromasia 12/03/2018 Present     Tear Drop Cells 12/03/2018 Present     Platelet Estimate 46/37/1155 Increased*    C difficile toxin by PCR 12/03/2018 NEGATIVE for C difficle toxin by PCR        Salmonella sp PCR 12/03/2018 None Detected     Shigella sp/Enteroinvasi* 12/03/2018 None Detected     Campylobacter sp (jejuni* 12/03/2018 None Detected     Shiga toxin 1/Shiga toxi* 12/03/2018 None Detected     Platelets 95/85/3954 475*    MPV 12/03/2018 8 0*    WBC 12/03/2018 10 79*    RBC 12/03/2018 3 59*    Hemoglobin 12/03/2018 11 0*    Hematocrit 12/03/2018 32 4*    MCV 12/03/2018 90     MCH 12/03/2018 30 6     MCHC 12/03/2018 34 0     RDW 12/03/2018 13 0     MPV 12/03/2018 8 0*    Platelets 59/89/9171 490*    nRBC 12/03/2018 0     Sodium 12/03/2018 137     Potassium 12/03/2018 3 5     Chloride 12/03/2018 102     CO2 12/03/2018 27     ANION GAP 12/03/2018 8     BUN 12/03/2018 13     Creatinine 12/03/2018 0 87     Glucose 12/03/2018 125     Calcium 12/03/2018 7 7*    eGFR 12/03/2018 98     Magnesium 12/03/2018 1 8     Segmented % 12/03/2018 61     Bands % 12/03/2018 5     Lymphocytes % 12/03/2018 16     Monocytes % 12/03/2018 17*    Eosinophils, % 12/03/2018 1     Basophils % 12/03/2018 0     Absolute Neutrophils 12/03/2018 7 12     Lymphocytes Absolute 12/03/2018 1 73     Monocytes Absolute 12/03/2018 1 83*    Eosinophils Absolute 12/03/2018 0 11     Basophils Absolute 12/03/2018 0 00     Total Counted 12/03/2018 100     Dohle Bodies 12/03/2018 Present     Toxic Granulation 12/03/2018 Present     Polychromasia 12/03/2018 Present     Platelet Estimate 83/53/6230 Increased*    CRP 12/03/2018 >90 0*    Sed Rate 12/03/2018 38*    Ventricular Rate 12/03/2018 80     Atrial Rate 12/03/2018 80     [place] Interval 12/03/2018 126     QRSD Interval 12/03/2018 96     QT Interval 12/03/2018 348     QTC Interval 12/03/2018 401     P Axis 12/03/2018 25     QRS Axis 12/03/2018 35     T Wave [place] 12/03/2018 57     WBC 12/04/2018 10 52*    RBC 12/04/2018 3 26*    Hemoglobin 12/04/2018 10 0*    Hematocrit 12/04/2018 30 0*    MCV 12/04/2018 92     MCH 12/04/2018 30 7     MCHC 12/04/2018 33 3     RDW 12/04/2018 13 3     MPV 12/04/2018 8 1*    Platelets 78/21/5979 446*    nRBC 12/04/2018 0     Sodium 12/04/2018 137     Potassium 12/04/2018 3 7     Chloride 12/04/2018 104     CO2 12/04/2018 28     ANION GAP 12/04/2018 5     BUN 12/04/2018 11     Creatinine 12/04/2018 0 82     Glucose 12/04/2018 135     Calcium 12/04/2018 7 6*    eGFR 12/04/2018 100     Segmented % 12/04/2018 51     Bands % 12/04/2018 12*    Lymphocytes % 12/04/2018 18     Monocytes % 12/04/2018 8     Eosinophils, % 12/04/2018 11*    Basophils % 12/04/2018 0     Absolute Neutrophils 12/04/2018 6 63     Lymphocytes Absolute 12/04/2018 1 89     Monocytes Absolute 12/04/2018 0 84     Eosinophils Absolute 12/04/2018 1 16*    Basophils Absolute 12/04/2018 0 00     Total Counted 12/04/2018 100     Anisocytosis 12/04/2018 Present     Polychromasia 12/04/2018 Present     Platelet Estimate 81/44/5866 Increased*    WBC 12/05/2018 10 13     RBC 12/05/2018 3 55*    Hemoglobin 12/05/2018 10 8*    Hematocrit 12/05/2018 33 2*    MCV 12/05/2018 94     MCH 12/05/2018 30 4     MCHC 12/05/2018 32 5     RDW 12/05/2018 13 3     MPV 12/05/2018 8 0*    Platelets 34/71/0459 504*    nRBC 12/05/2018 0     Sodium 12/05/2018 139     Potassium 12/05/2018 4 2     Chloride 12/05/2018 107     CO2 12/05/2018 27     ANION GAP 12/05/2018 5     BUN 12/05/2018 15     Creatinine 12/05/2018 0 88     Glucose 12/05/2018 158*    Calcium 12/05/2018 8 0*    eGFR 12/05/2018 97     Segmented % 12/05/2018 61     Bands % 12/05/2018 21*    Lymphocytes % 12/05/2018 10*    Monocytes % 12/05/2018 3*    Eosinophils, % 12/05/2018 0     Basophils % 12/05/2018 0     Atypical Lymphocytes % 12/05/2018 5*    Absolute Neutrophils 12/05/2018 8 31*    Lymphocytes Absolute 12/05/2018 1 01     Monocytes Absolute 12/05/2018 0 30     Eosinophils Absolute 12/05/2018 0 00     Basophils Absolute 12/05/2018 0 00     Total Counted 12/05/2018 100     Polychromasia 12/05/2018 Present     Platelet Estimate 90/75/8596 Increased*     OBSTRUCTION SERIES     INDICATION:   colonic distention      COMPARISON:  Acute abdominal series 12/3/2018     EXAM PERFORMED/VIEWS:  XR ABDOMEN OBSTRUCTION SERIES        FINDINGS:     Air filled, dilated small and large bowel loops with air fluid levels findings suggestive of adynamic ileus       No free air beneath the hemidiaphragms       No pathologic calcifications or soft tissue masses evident       Osseous structures are unremarkable      Examination of the chest reveals a normal cardiomediastinal silhouette  Lungs are clear      IMPRESSION:     Findings suggestive of adynamic ileus            Assessment/Plan:    1   Adynamic ileus in the setting of ulcerative pancolitis; colonoscopy 2 days ago showed some dusky appearing mucosa in the right-sided colon; fortunately patient appears to be clinically improving with decreased rectal bleeding, continuing to pass flatus, abdomen appears softer on exam today    - bowel rest, diet as per surgery  - continue high-dose steroids, Solu-Medrol 20 mg IV Q 8 hours  - serial abdominal exams, monitor temperature and white blood cell count  - continue antibiotics  - monitor electrolytes closely

## 2018-12-06 NOTE — PROGRESS NOTES
Progress Note - General Surgery   Sabas Luna 46 y o  male MRN: [id number]  Unit/Bed#: -Young Encounter: [id number]    Assessment:  45 yo M with acute colonic dilation and colitis     - improved this am   - bowel movement last night, soft no gross blood   - abdomen less distended    Plan:  Continue clears this morning, may advance to toast and crackers this afternoon if continues to feel well   CBC and BMP pending for this am, monitor leukocytosis and bandemia   Keep electrolytes repleted K>4, Phos>3, Mg>2  Serial abodminal exams   Continue Unasyn and steroids   Follow up pathology from colonoscopy biopsy     Subjective/Objective   Chief Complaint:     Subjective: no overnight events  No nausea/vomiting  No shortness of breath or chest pain     Objective:     Blood pressure 134/75, pulse 64, temperature 98 4 °F (36 9 °C), temperature source Oral, resp  rate 18, height 5' 7" (1 702 m), weight 100 kg (220 lb 12 8 oz), SpO2 94 %  ,Body mass index is 34 58 kg/m²  Intake/Output Summary (Last 24 hours) at 12/06/18 0640  Last data filed at 12/06/18 0000   Gross per 24 hour   Intake          2273 33 ml   Output                0 ml   Net          2273 33 ml       Invasive Devices     Peripheral Intravenous Line            Peripheral IV 12/02/18 Right Antecubital 3 days    Peripheral IV 12/04/18 Left Forearm 1 day    Peripheral IV 12/04/18 Left Hand 1 day                Physical Exam: General: AAOx3  Respiratory: BS b/l  Abdomen: Soft, distended, minimally tympanitic  Overall less distention than previously   Heart: RRR, S1s2  Ext: Warm no cyanosis       Lab, Imaging and other studies:I have personally reviewed pertinent lab results    , CBC: No results found for: WBC, HGB, HCT, MCV, PLT, ADJUSTEDWBC, MCH, MCHC, RDW, MPV, NRBC, CMP: No results found for: SODIUM, K, CL, CO2, ANIONGAP, BUN, CREATININE, GLUCOSE, CALCIUM, AST, ALT, ALKPHOS, PROT, BILITOT, EGFR  VTE Pharmacologic Prophylaxis: Sequential compression device Brigid Barahona   VTE Mechanical Prophylaxis: sequential compression device

## 2018-12-07 LAB
ANION GAP SERPL CALCULATED.3IONS-SCNC: 4 MMOL/L (ref 4–13)
ANISOCYTOSIS BLD QL SMEAR: PRESENT
BASOPHILS # BLD MANUAL: 0 THOUSAND/UL (ref 0–0.1)
BASOPHILS NFR MAR MANUAL: 0 % (ref 0–1)
BUN SERPL-MCNC: 13 MG/DL (ref 5–25)
CALCIUM SERPL-MCNC: 7.2 MG/DL (ref 8.3–10.1)
CHLORIDE SERPL-SCNC: 110 MMOL/L (ref 100–108)
CO2 SERPL-SCNC: 23 MMOL/L (ref 21–32)
CREAT SERPL-MCNC: 0.7 MG/DL (ref 0.6–1.3)
EOSINOPHIL # BLD MANUAL: 0 THOUSAND/UL (ref 0–0.4)
EOSINOPHIL NFR BLD MANUAL: 0 % (ref 0–6)
ERYTHROCYTE [DISTWIDTH] IN BLOOD BY AUTOMATED COUNT: 13.9 % (ref 11.6–15.1)
GFR SERPL CREATININE-BSD FRML MDRD: 107 ML/MIN/1.73SQ M
GLUCOSE SERPL-MCNC: 137 MG/DL (ref 65–140)
HCT VFR BLD AUTO: 28.4 % (ref 36.5–49.3)
HGB BLD-MCNC: 9.3 G/DL (ref 12–17)
LYMPHOCYTES # BLD AUTO: 1.02 THOUSAND/UL (ref 0.6–4.47)
LYMPHOCYTES # BLD AUTO: 11 % (ref 14–44)
MCH RBC QN AUTO: 30.5 PG (ref 26.8–34.3)
MCHC RBC AUTO-ENTMCNC: 32.7 G/DL (ref 31.4–37.4)
MCV RBC AUTO: 93 FL (ref 82–98)
METAMYELOCYTES NFR BLD MANUAL: 2 % (ref 0–1)
MONOCYTES # BLD AUTO: 0.46 THOUSAND/UL (ref 0–1.22)
MONOCYTES NFR BLD: 5 % (ref 4–12)
MYELOCYTES NFR BLD MANUAL: 1 % (ref 0–1)
NEUTROPHILS # BLD MANUAL: 7.5 THOUSAND/UL (ref 1.85–7.62)
NEUTS BAND NFR BLD MANUAL: 16 % (ref 0–8)
NEUTS SEG NFR BLD AUTO: 65 % (ref 43–75)
NRBC BLD AUTO-RTO: 0 /100 WBCS
PLATELET # BLD AUTO: 320 THOUSANDS/UL (ref 149–390)
PLATELET BLD QL SMEAR: ABNORMAL
PMV BLD AUTO: 9.1 FL (ref 8.9–12.7)
POLYCHROMASIA BLD QL SMEAR: PRESENT
POTASSIUM SERPL-SCNC: 4.5 MMOL/L (ref 3.5–5.3)
RBC # BLD AUTO: 3.05 MILLION/UL (ref 3.88–5.62)
SODIUM SERPL-SCNC: 137 MMOL/L (ref 136–145)
TOTAL CELLS COUNTED SPEC: 100
WBC # BLD AUTO: 9.26 THOUSAND/UL (ref 4.31–10.16)

## 2018-12-07 PROCEDURE — 85027 COMPLETE CBC AUTOMATED: CPT | Performed by: SURGERY

## 2018-12-07 PROCEDURE — 85007 BL SMEAR W/DIFF WBC COUNT: CPT | Performed by: SURGERY

## 2018-12-07 PROCEDURE — 99232 SBSQ HOSP IP/OBS MODERATE 35: CPT | Performed by: PHYSICIAN ASSISTANT

## 2018-12-07 PROCEDURE — 80048 BASIC METABOLIC PNL TOTAL CA: CPT | Performed by: SURGERY

## 2018-12-07 RX ADMIN — SODIUM CHLORIDE 3 G: 9 INJECTION, SOLUTION INTRAVENOUS at 10:12

## 2018-12-07 RX ADMIN — POTASSIUM CHLORIDE 10 MEQ: 750 TABLET, EXTENDED RELEASE ORAL at 08:26

## 2018-12-07 RX ADMIN — POTASSIUM CHLORIDE 10 MEQ: 750 TABLET, EXTENDED RELEASE ORAL at 18:02

## 2018-12-07 RX ADMIN — LOSARTAN POTASSIUM 50 MG: 50 TABLET, FILM COATED ORAL at 08:27

## 2018-12-07 RX ADMIN — POTASSIUM CHLORIDE, DEXTROSE MONOHYDRATE AND SODIUM CHLORIDE 50 ML/HR: 150; 5; 900 INJECTION, SOLUTION INTRAVENOUS at 13:07

## 2018-12-07 RX ADMIN — SODIUM CHLORIDE 3 G: 9 INJECTION, SOLUTION INTRAVENOUS at 04:36

## 2018-12-07 RX ADMIN — ENOXAPARIN SODIUM 40 MG: 40 INJECTION SUBCUTANEOUS at 08:26

## 2018-12-07 RX ADMIN — SODIUM CHLORIDE 3 G: 9 INJECTION, SOLUTION INTRAVENOUS at 18:02

## 2018-12-07 RX ADMIN — METHYLPREDNISOLONE SODIUM SUCCINATE 20 MG: 40 INJECTION, POWDER, FOR SOLUTION INTRAMUSCULAR; INTRAVENOUS at 18:02

## 2018-12-07 RX ADMIN — SODIUM CHLORIDE 3 G: 9 INJECTION, SOLUTION INTRAVENOUS at 23:21

## 2018-12-07 RX ADMIN — METHYLPREDNISOLONE SODIUM SUCCINATE 20 MG: 40 INJECTION, POWDER, FOR SOLUTION INTRAMUSCULAR; INTRAVENOUS at 08:26

## 2018-12-07 NOTE — UTILIZATION REVIEW
Continued Stay Review    Date: 12/7/2018     Vital Signs: /70 (BP Location: Right arm)   Pulse 56   Temp 98 4 °F (36 9 °C) (Oral)   Resp 20   Ht 5' 7" (1 702 m)   Wt 99 8 kg (220 lb 0 3 oz)   SpO2 98%   BMI 34 46 kg/m²     Medications:   Scheduled Meds:   Current Facility-Administered Medications:  acetaminophen 650 mg Oral Q6H PRN    ampicillin-sulbactam 3 g Intravenous Q6H Last Rate: 3 g (12/07/18 1012)   dextrose 5 % and sodium chloride 0 9 % with KCl 20 mEq/L 50 mL/hr Intravenous Continuous Last Rate: 50 mL/hr (12/07/18 1307)   docusate sodium 100 mg Oral BID PRN    enoxaparin 40 mg Subcutaneous Q24H CARLOTA    HYDROmorphone 0 5 mg Intravenous Q4H PRN    losartan 50 mg Oral Daily    methylPREDNISolone sodium succinate 20 mg Intravenous Q8H    ondansetron 4 mg Intravenous Q4H PRN    oxyCODONE-acetaminophen 1 tablet Oral Q4H PRN    potassium chloride 10 mEq Oral BID      Continuous Infusions:   dextrose 5 % and sodium chloride 0 9 % with KCl 20 mEq/L 50 mL/hr Last Rate: 50 mL/hr (12/07/18 1307)     PRN Meds: not used     Abnormal Labs/Diagnostic Results:   wbc 9 26, hgb 9 3, hct 28 4  Bands 16%  Cl 110  Calcium 7 2  Calprotectin,Fecal [[id number]] (Abnormal) Collected: 12/03/18 1126   Lab Status: Final result Specimen: Stool from Rectum Updated: 12/06/18 2008    Calprotectin 486 (H) ug/g     Comment: Concentration     Interpretation   Follow-Up   <16 - 50 ug/g     Normal           None   >50 -120 ug/g     Borderline       Re-evaluate in 4-6 weeks       >120 ug/g     Abnormal         Repeat as clinically                                      indicated          Procedure 12/4/2018- colonoscopy - Hemorrhagic mucosa with Deep ulcers noted starting from rectum extending to the transverse colon, mucosa within the ascending colon appeared dusky, the scope was not further advanced into the cecum    Colon was decompressed on withdrawal   Several biopsies were taken from the rectum to assess for ulcerative colitis    Age/Sex: 46 y o  male     Assessment/Plan: 46year old male presenting with colitis     Plan:  - will advance diet to surgical soft  - continue abx/steroids  - encourage oob    Discharge Plan: to be determined  [facility]  Utilization Review Department  Phone: [phone]; Fax [phone]  [email or web address]  ATTENTION: Please call with any questions or concerns to [phone]  and carefully listen to the prompts so that you are directed to the right person  Send all requests for admission clinical reviews, approved or denied determinations and any other requests to fax [phone]   All voicemails are confidential

## 2018-12-07 NOTE — PROGRESS NOTES
Progress Note -Surgery PA  Hali Vasquez 46 y o  male MRN: [id number]  Unit/Bed#: -01 Encounter: [id number]      Subjective/Objective     Subjective:   Patient states he is feeling little bit better  Continues to have blood in stool  Diarrhea overnight  White count down to 9 from 15 yesterday      Objective:     /70 (BP Location: Right arm)   Pulse 56   Temp 98 4 °F (36 9 °C) (Oral)   Resp 20   Ht 5' 7" (1 702 m)   Wt 99 8 kg (220 lb 0 3 oz)   SpO2 98%   BMI 34 46 kg/m²       Intake/Output Summary (Last 24 hours) at 12/07/18 7669  Last data filed at 12/07/18 4966   Gross per 24 hour   Intake                0 ml   Output             1500 ml   Net            -1500 ml       Invasive Devices     Peripheral Intravenous Line            Peripheral IV 12/04/18 Left Forearm 2 days    Long-Dwell Peripheral IV (Midline) 89/97/84 Left Basilic less than 1 day                Physical Exam:  General appearance: alert and oriented, in no acute distress  Lungs: clear to auscultation bilaterally  Heart: regular rate and rhythm, S1, S2 normal, no murmur, click, rub or gallop  Abdomen: soft, non tender, hyperactive bowel sounds      Current Facility-Administered Medications:     acetaminophen (TYLENOL) tablet 650 mg, 650 mg, Oral, Q6H PRN, Manju Staton    ampicillin-sulbactam (UNASYN) 3 g in sodium chloride 0 9 % 100 mL IVPB, 3 g, Intravenous, Q6H, Manju Staton, Last Rate: 200 mL/hr at 12/07/18 0436, 3 g at 12/07/18 0436    dextrose 5 % and sodium chloride 0 9 % with KCl 20 mEq/L infusion (premix), 75 mL/hr, Intravenous, Continuous, Cony Nelson MD, Last Rate: 75 mL/hr at 12/06/18 2224, 75 mL/hr at 12/06/18 2224    docusate sodium (COLACE) capsule 100 mg, 100 mg, Oral, BID PRN, Manju Staton    enoxaparin (LOVENOX) subcutaneous injection 40 mg, 40 mg, Subcutaneous, Q24H [facility], Esther Pepper PA-C, 40 mg at 12/07/18 0826    HYDROmorphone (DILAUDID) injection 0 5 mg, 0 5 mg, Intravenous, Q4H PRN, Magdalene Romberg, 0 5 mg at 12/04/18 1122    losartan (COZAAR) tablet 50 mg, 50 mg, Oral, Daily, Yuly Trevor, PA-C, 50 mg at 12/07/18 0827    methylPREDNISolone sodium succinate (Solu-MEDROL) injection 20 mg, 20 mg, Intravenous, Q8H, Esther Pepper, PA-C, 20 mg at 12/07/18 0826    ondansetron (ZOFRAN) injection 4 mg, 4 mg, Intravenous, Q4H PRN, Sammy Beagle    oxyCODONE-acetaminophen (PERCOCET) 5-325 mg per tablet 1 tablet, 1 tablet, Oral, Q4H PRN, Sammy Beagle, 1 tablet at 12/05/18 1625    potassium chloride (K-DUR,KLOR-CON) CR tablet 10 mEq, 10 mEq, Oral, BID, Yuly Curly, PA-C, 10 mEq at 12/07/18 1916              Lab, Imaging and other studies:  I have personally reviewed pertinent lab results  , CBC:   Lab Results   Component Value Date    WBC 9 26 12/07/2018    HGB 9 3 (L) 12/07/2018    HCT 28 4 (L) 12/07/2018    MCV 93 12/07/2018     12/07/2018    MCH 30 5 12/07/2018    MCHC 32 7 12/07/2018    RDW 13 9 12/07/2018    MPV 9 1 12/07/2018    NRBC 0 12/07/2018   , CMP:   Lab Results   Component Value Date    SODIUM 137 12/07/2018    K 4 5 12/07/2018     (H) 12/07/2018    CO2 23 12/07/2018    BUN 13 12/07/2018    CREATININE 0 70 12/07/2018    CALCIUM 7 2 (L) 12/07/2018    EGFR 107 12/07/2018     Labs in chart were reviewed    Lab Results   Component Value Date    WBC 9 26 12/07/2018    HGB 9 3 (L) 12/07/2018    HCT 28 4 (L) 12/07/2018     12/07/2018     Lab Results   Component Value Date     12/16/2017    K 4 5 12/07/2018    K 4 0 11/30/2018     (H) 12/07/2018     11/30/2018    CO2 23 12/07/2018    CO2 30 11/30/2018    BUN 13 12/07/2018    BUN 11 11/30/2018    CREATININE 0 70 12/07/2018    CREATININE 0 97 12/16/2017       VTE Pharmacologic Prophylaxis: Enoxaparin (Lovenox)  VTE Mechanical Prophylaxis: sequential compression device    Assessment:    46year old male presenting with colitis    Plan:    - will advance diet to surgical soft  - continue abx/steroids  - encourage oob    Patient Active Problem List   Diagnosis    Essential hypertension    Gout    Impaired fasting glucose    Colitis with rectal bleeding    Generalized abdominal pain    Hypokalemia    Abdominal distension    Colitis          This text is generated with voice recognition software  There may be translation, syntax,  or grammatical errors  If you have any questions, please contact the dictating provider      Elizabeth Hills PA-C

## 2018-12-07 NOTE — PROGRESS NOTES
Progress Note - Florence Curry 46 y o  male MRN: [id number]    Unit/Bed#: -01 Encounter: [id number]        Subjective:   Patient reports he has had about 5 small bowel movements since last night, he thinks there may be less blood than before although there is a small amount of blood mixed with each bowel movement  He says overall he is feeling better, he is having less abdominal pain and feeling less bloated  He reports to passing flatus  Tolerating diet without postprandial pain, nausea or vomiting  Objective:     Vitals: Blood pressure 130/70, pulse 56, temperature 98 4 °F (36 9 °C), temperature source Oral, resp  rate 20, height 5' 7" (1 702 m), weight 99 8 kg (220 lb 0 3 oz), SpO2 98 %  ,Body mass index is 34 46 kg/m²  Intake/Output Summary (Last 24 hours) at 12/07/18 1214  Last data filed at 12/07/18 1034   Gross per 24 hour   Intake              120 ml   Output             1920 ml   Net            -1800 ml       Physical Exam:   General appearance: alert, appears stated age and cooperative  Lungs: clear to auscultation bilaterally, no labored breathing/accessory muscle use  Heart: regular rate and rhythm, S1, S2 normal, no murmur, click, rub or gallop  Abdomen: soft, distended with some tympany to percussion, mild generalized tenderness with no guarding or peritoneal signs; bowel sounds normal; no masses,  no organomegaly  Extremities: no edema    Invasive Devices     Peripheral Intravenous Line            Peripheral IV 12/04/18 Left Forearm 2 days    Long-Dwell Peripheral IV (Midline) 61/87/89 Left Basilic less than 1 day                Lab, Imaging and other studies: I have personally reviewed pertinent reports  Admission on 12/02/2018   No results displayed because visit has over 200 results  Results for Anisha Lima (MRN [id number]) as of 12/7/2018 12:14   Ref   Range 12/5/2018 06:35 12/5/2018 19:16 12/6/2018 13:04 12/7/2018 04:49 12/7/2018 06:30   eGFR Latest Units: ml/min/1 73sq m 97  101 107    Sodium Latest Ref Range: 136 - 145 mmol/L 139  139 137    Potassium Latest Ref Range: 3 5 - 5 3 mmol/L 4 2  3 9 4 5    Chloride Latest Ref Range: 100 - 108 mmol/L 107  105 110 (H)    CO2 Latest Ref Range: 21 - 32 mmol/L 27  26 23    Anion Gap Latest Ref Range: 4 - 13 mmol/L 5  8 4    BUN Latest Ref Range: 5 - 25 mg/dL 15  17 13    Creatinine Latest Ref Range: 0 60 - 1 30 mg/dL 0 88  0 80 0 70    Glucose, Random Latest Ref Range: 65 - 140 mg/dL 158 (H)  144 (H) 137    Calcium Latest Ref Range: 8 3 - 10 1 mg/dL 8 0 (L)  7 9 (L) 7 2 (L)    WBC Latest Ref Range: 4 31 - 10 16 Thousand/uL 10 13  14 88 (H)  9 26   Red Blood Cell Count Latest Ref Range: 3 88 - 5 62 Million/uL 3 55 (L)  3 46 (L)  3 05 (L)   Hemoglobin Latest Ref Range: 12 0 - 17 0 g/dL 10 8 (L)  10 7 (L)  9 3 (L)   HCT Latest Ref Range: 36 5 - 49 3 % 33 2 (L)  32 0 (L)  28 4 (L)   MCV Latest Ref Range: 82 - 98 fL 94  93  93   MCH Latest Ref Range: 26 8 - 34 3 pg 30 4  30 9  30 5   MCHC Latest Ref Range: 31 4 - 37 4 g/dL 32 5  33 4  32 7   RDW Latest Ref Range: 11 6 - 15 1 % 13 3  13 5  13 9   Platelet Count Latest Ref Range: 149 - 390 Thousands/uL 504 (H)  565 (H)  320   MPV Latest Ref Range: 8 9 - 12 7 fL 8 0 (L)  8 2 (L)  9 1   Platelet Estimate Latest Ref Range: Adequate  Increased (A)  Increased (A)  Increased (A)   nRBC Latest Units: /100 WBCs 0  0  0   Segs Relative Latest Ref Range: 43 - 75 % 61  61  65   Abs Neutrophils Latest Ref Range: 1 85 - 7 62 Thousand/uL 8 31 (H)  10 86 (H)  7 50   Bands Relative Latest Ref Range: 0 - 8 % 21 (H)  12 (H)  16 (H)   Lymphocytes % Latest Ref Range: 14 - 44 % 10 (L)  11 (L)  11 (L)   Monocytes Latest Ref Range: 4 - 12 % 3 (L)  13 (H)  5   Eosinophils Latest Ref Range: 0 - 6 % 0  0  0   Basophils Relative Latest Ref Range: 0 - 1 % 0  0  0   Myelocytes % Latest Ref Range: 0 - 1 %     1   Metamyelocytes% Latest Ref Range: 0 - 1 %   1  2 (H)   Atypical Lymphocytes % Latest Ref Range: <=0 % 5 (H)  2 (H)     Absolute Eosinophils Latest Ref Range: 0 00 - 0 40 Thousand/uL 0 00  0 00  0 00   Basophils Absolute Latest Ref Range: 0 00 - 0 10 Thousand/uL 0 00  0 00  0 00   Total Counted Unknown 100  100  100   NRBC's Latest Ref Range: 0 - 2 /100 WBC   2     Polychromasia Unknown Present  Present  Present   Anisocytosis Unknown     Present   LYMPHOCYTES ABSOLUTE Latest Ref Range: 0 60 - 4 47 Thousand/uL 1 01  1 64  1 02   Monocytes Absolute Latest Ref Range: 0 00 - 1 22 Thousand/uL 0 30  1 93 (H)  0 46   XR ABDOMEN OBSTRUCTION SERIES Unknown  Rpt        Biopsy from colonoscopy 12/4/18  Final Diagnosis   A  Colon, rectum, biopsy:             - Chronic active proctitis with crypt architectural distortion, cryptitis and crypt abscesses  - No granulomas are identified              - No dysplasia or malignancy is identified  Assessment/Plan:    1  Severe pancolitis resulting in ileus, most likely secondary to ulcerative colitis; biopsies showed findings consistent with inflammatory bowel disease    Patient appears to be gradually improving clinically after introduction of IV steroids    - continue IV steroids for now  - continue IV Unasyn  - bowel rest; gradual advancement of diet as per surgery  - monitor abdominal exam closely, if patient develops increased abdominal distention/pain, absence of flatus, etc, then would recommend repeating stat obstruction series

## 2018-12-08 LAB
ANION GAP SERPL CALCULATED.3IONS-SCNC: 4 MMOL/L (ref 4–13)
BASOPHILS # BLD MANUAL: 0 THOUSAND/UL (ref 0–0.1)
BASOPHILS NFR MAR MANUAL: 0 % (ref 0–1)
BUN SERPL-MCNC: 13 MG/DL (ref 5–25)
CALCIUM SERPL-MCNC: 7.8 MG/DL (ref 8.3–10.1)
CHLORIDE SERPL-SCNC: 107 MMOL/L (ref 100–108)
CO2 SERPL-SCNC: 28 MMOL/L (ref 21–32)
CREAT SERPL-MCNC: 0.83 MG/DL (ref 0.6–1.3)
EOSINOPHIL # BLD MANUAL: 0 THOUSAND/UL (ref 0–0.4)
EOSINOPHIL NFR BLD MANUAL: 0 % (ref 0–6)
ERYTHROCYTE [DISTWIDTH] IN BLOOD BY AUTOMATED COUNT: 13.8 % (ref 11.6–15.1)
GFR SERPL CREATININE-BSD FRML MDRD: 100 ML/MIN/1.73SQ M
GLUCOSE SERPL-MCNC: 134 MG/DL (ref 65–140)
HCT VFR BLD AUTO: 28.4 % (ref 36.5–49.3)
HGB BLD-MCNC: 9.4 G/DL (ref 12–17)
LYMPHOCYTES # BLD AUTO: 1.37 THOUSAND/UL (ref 0.6–4.47)
LYMPHOCYTES # BLD AUTO: 13 % (ref 14–44)
MCH RBC QN AUTO: 30.9 PG (ref 26.8–34.3)
MCHC RBC AUTO-ENTMCNC: 33.1 G/DL (ref 31.4–37.4)
MCV RBC AUTO: 93 FL (ref 82–98)
MONOCYTES # BLD AUTO: 1.37 THOUSAND/UL (ref 0–1.22)
MONOCYTES NFR BLD: 13 % (ref 4–12)
NEUTROPHILS # BLD MANUAL: 7.69 THOUSAND/UL (ref 1.85–7.62)
NEUTS BAND NFR BLD MANUAL: 6 % (ref 0–8)
NEUTS SEG NFR BLD AUTO: 67 % (ref 43–75)
NRBC BLD AUTO-RTO: 0 /100 WBCS
PLATELET # BLD AUTO: 545 THOUSANDS/UL (ref 149–390)
PLATELET BLD QL SMEAR: ABNORMAL
PMV BLD AUTO: 8.4 FL (ref 8.9–12.7)
POLYCHROMASIA BLD QL SMEAR: PRESENT
POTASSIUM SERPL-SCNC: 4.5 MMOL/L (ref 3.5–5.3)
RBC # BLD AUTO: 3.04 MILLION/UL (ref 3.88–5.62)
SODIUM SERPL-SCNC: 139 MMOL/L (ref 136–145)
TOTAL CELLS COUNTED SPEC: 100
TOXIC GRANULES BLD QL SMEAR: PRESENT
VARIANT LYMPHS # BLD AUTO: 1 %
WBC # BLD AUTO: 10.53 THOUSAND/UL (ref 4.31–10.16)

## 2018-12-08 PROCEDURE — 85027 COMPLETE CBC AUTOMATED: CPT | Performed by: PHYSICIAN ASSISTANT

## 2018-12-08 PROCEDURE — 80048 BASIC METABOLIC PNL TOTAL CA: CPT | Performed by: PHYSICIAN ASSISTANT

## 2018-12-08 PROCEDURE — 99232 SBSQ HOSP IP/OBS MODERATE 35: CPT | Performed by: INTERNAL MEDICINE

## 2018-12-08 PROCEDURE — 85007 BL SMEAR W/DIFF WBC COUNT: CPT | Performed by: PHYSICIAN ASSISTANT

## 2018-12-08 RX ORDER — MESALAMINE 400 MG/1
1600 CAPSULE, DELAYED RELEASE ORAL 3 TIMES DAILY
Status: DISCONTINUED | OUTPATIENT
Start: 2018-12-08 | End: 2018-12-09 | Stop reason: HOSPADM

## 2018-12-08 RX ORDER — PREDNISONE 20 MG/1
40 TABLET ORAL DAILY
Status: DISCONTINUED | OUTPATIENT
Start: 2018-12-08 | End: 2018-12-08

## 2018-12-08 RX ADMIN — PREDNISONE 50 MG: 20 TABLET ORAL at 14:02

## 2018-12-08 RX ADMIN — POTASSIUM CHLORIDE 10 MEQ: 750 TABLET, EXTENDED RELEASE ORAL at 08:58

## 2018-12-08 RX ADMIN — ENOXAPARIN SODIUM 40 MG: 40 INJECTION SUBCUTANEOUS at 08:58

## 2018-12-08 RX ADMIN — MESALAMINE 1600 MG: 400 CAPSULE, DELAYED RELEASE ORAL at 21:08

## 2018-12-08 RX ADMIN — POTASSIUM CHLORIDE 10 MEQ: 750 TABLET, EXTENDED RELEASE ORAL at 17:34

## 2018-12-08 RX ADMIN — METHYLPREDNISOLONE SODIUM SUCCINATE 20 MG: 40 INJECTION, POWDER, FOR SOLUTION INTRAMUSCULAR; INTRAVENOUS at 01:42

## 2018-12-08 RX ADMIN — METHYLPREDNISOLONE SODIUM SUCCINATE 20 MG: 40 INJECTION, POWDER, FOR SOLUTION INTRAMUSCULAR; INTRAVENOUS at 08:58

## 2018-12-08 RX ADMIN — SODIUM CHLORIDE 3 G: 9 INJECTION, SOLUTION INTRAVENOUS at 05:40

## 2018-12-08 RX ADMIN — POTASSIUM CHLORIDE, DEXTROSE MONOHYDRATE AND SODIUM CHLORIDE 50 ML/HR: 150; 5; 900 INJECTION, SOLUTION INTRAVENOUS at 08:59

## 2018-12-08 RX ADMIN — MESALAMINE 1600 MG: 400 CAPSULE, DELAYED RELEASE ORAL at 17:34

## 2018-12-08 RX ADMIN — LOSARTAN POTASSIUM 50 MG: 50 TABLET, FILM COATED ORAL at 08:59

## 2018-12-08 NOTE — PROGRESS NOTES
Progress Note - Erik Le 46 y o  male MRN: [id number]    Unit/Bed#: -01 Encounter: [id number]        Subjective:   Patient reports feeling relatively well, still having small amounts of diarrhea every few hours with bright red blood mixed in, denies any abdominal pain at this time  Says his bloating feels a little better  No nausea or vomiting  No subjective fevers or chills  Passing flatus  Objective:     Vitals: Blood pressure 116/81, pulse 60, temperature 98 4 °F (36 9 °C), temperature source Oral, resp  rate 18, height 5' 7" (1 702 m), weight 99 2 kg (218 lb 9 6 oz), SpO2 96 %  ,Body mass index is 34 24 kg/m²  Intake/Output Summary (Last 24 hours) at 12/08/18 1304  Last data filed at 12/08/18 0057   Gross per 24 hour   Intake                0 ml   Output             1825 ml   Net            -1825 ml       Physical Exam:   General appearance: alert, appears stated age and cooperative  Lungs: clear to auscultation bilaterally, no labored breathing/accessory muscle use  Heart: regular rate and rhythm, S1, S2 normal, no murmur, click, rub or gallop  Abdomen: soft, mild distention, nontender, bowel sounds normal; no masses,  no organomegaly  Extremities: no edema    Invasive Devices     Peripheral Intravenous Line            Long-Dwell Peripheral IV (Midline) 91/25/07 Left Basilic 2 days                Lab, Imaging and other studies: I have personally reviewed pertinent reports  Admission on 12/02/2018   No results displayed because visit has over 200 results  Results for Audrey Saucedo (MRN [id number]) as of 12/8/2018 13:04   Ref   Range 12/6/2018 13:04 12/7/2018 04:49 12/7/2018 06:30 12/8/2018 05:42   eGFR Latest Units: ml/min/1 73sq m 101 107  100   Sodium Latest Ref Range: 136 - 145 mmol/L 139 137  139   Potassium Latest Ref Range: 3 5 - 5 3 mmol/L 3 9 4 5  4 5   Chloride Latest Ref Range: 100 - 108 mmol/L 105 110 (H)  107   CO2 Latest Ref Range: 21 - 32 mmol/L 26 23  28   Anion Gap Latest Ref Range: 4 - 13 mmol/L 8 4  4   BUN Latest Ref Range: 5 - 25 mg/dL 17 13  13   Creatinine Latest Ref Range: 0 60 - 1 30 mg/dL 0 80 0 70  0 83   Glucose, Random Latest Ref Range: 65 - 140 mg/dL 144 (H) 137  134   Calcium Latest Ref Range: 8 3 - 10 1 mg/dL 7 9 (L) 7 2 (L)  7 8 (L)   WBC Latest Ref Range: 4 31 - 10 16 Thousand/uL 14 88 (H)  9 26 10 53 (H)   Red Blood Cell Count Latest Ref Range: 3 88 - 5 62 Million/uL 3 46 (L)  3 05 (L) 3 04 (L)   Hemoglobin Latest Ref Range: 12 0 - 17 0 g/dL 10 7 (L)  9 3 (L) 9 4 (L)   HCT Latest Ref Range: 36 5 - 49 3 % 32 0 (L)  28 4 (L) 28 4 (L)   MCV Latest Ref Range: 82 - 98 fL 93  93 93   MCH Latest Ref Range: 26 8 - 34 3 pg 30 9  30 5 30 9   MCHC Latest Ref Range: 31 4 - 37 4 g/dL 33 4  32 7 33 1   RDW Latest Ref Range: 11 6 - 15 1 % 13 5  13 9 13 8   Platelet Count Latest Ref Range: 149 - 390 Thousands/uL 565 (H)  320 545 (H)   MPV Latest Ref Range: 8 9 - 12 7 fL 8 2 (L)  9 1 8 4 (L)   Platelet Estimate Latest Ref Range: Adequate  Increased (A)  Increased (A) Increased (A)   nRBC Latest Units: /100 WBCs 0  0 0   Segs Relative Latest Ref Range: 43 - 75 % 61  65 67   Abs Neutrophils Latest Ref Range: 1 85 - 7 62 Thousand/uL 10 86 (H)  7 50 7 69 (H)   Bands Relative Latest Ref Range: 0 - 8 % 12 (H)  16 (H) 6   Lymphocytes % Latest Ref Range: 14 - 44 % 11 (L)  11 (L) 13 (L)   Monocytes Latest Ref Range: 4 - 12 % 13 (H)  5 13 (H)   Eosinophils Latest Ref Range: 0 - 6 % 0  0 0   Basophils Relative Latest Ref Range: 0 - 1 % 0  0 0   Myelocytes % Latest Ref Range: 0 - 1 %   1    Metamyelocytes% Latest Ref Range: 0 - 1 % 1  2 (H)    Atypical Lymphocytes % Latest Ref Range: <=0 % 2 (H)   1 (H)   Absolute Eosinophils Latest Ref Range: 0 00 - 0 40 Thousand/uL 0 00  0 00 0 00   Basophils Absolute Latest Ref Range: 0 00 - 0 10 Thousand/uL 0 00  0 00 0 00   Total Counted Unknown 100  100 100   NRBC's Latest Ref Range: 0 - 2 /100 WBC 2      Polychromasia Unknown Present  Present Present   Toxic Granulation Unknown    Present   Anisocytosis Unknown   Present    LYMPHOCYTES ABSOLUTE Latest Ref Range: 0 60 - 4 47 Thousand/uL 1 64  1 02 1 37   Monocytes Absolute Latest Ref Range: 0 00 - 1 22 Thousand/uL 1 93 (H)  0 46 1 37 (H)       Assessment/Plan:    1  Severe pancolitis which appears to represent ulcerative colitis, biopsies appear consistent with this  Presented with megacolon although appears to be improving since introduction of IV steroids, he appears to be tolerating soft diet at this time    He does report some ongoing diarrhea and rectal bleeding which does not appear to be worsening over the last couple of days    - will transition to oral steroids; discontinue IV Solu-Medrol and start prednisone 50 mg with plan for long taper (recommend 50 mg daily for 1 week, then taper by 5 mg each week)  - monitor abdominal exam closely, diet as per surgery  - will reassess tomorrow  - also start maintenance medications; start Delzicol 1600 mg 3 times daily

## 2018-12-08 NOTE — PROGRESS NOTES
Progress Note -Surgery PA Asa Cowden 46 y o  male MRN: [id number]  Unit/Bed#: -01 Encounter: [id number]      Assessment:  46year old male with colitis  Plan:  Ambulate  Soft diet  Transition to oral steroids as per GI  Pain control as needed  Continue IV abx      Subjective/Objective     Subjective: Feels well but bloated today  No BM  Tolerating soft diet  Objective:     /81 (BP Location: Right arm)   Pulse 60   Temp 98 4 °F (36 9 °C) (Oral)   Resp 18   Ht 5' 7" (1 702 m)   Wt 99 2 kg (218 lb 9 6 oz)   SpO2 96%   BMI 34 24 kg/m²   I/O last 24 hours: In: 120 [P O :120]  Out: 2245 [Urine:2245]        Intake/Output Summary (Last 24 hours) at 12/08/18 0919  Last data filed at 12/08/18 0057   Gross per 24 hour   Intake              120 ml   Output             1825 ml   Net            -1705 ml       Invasive Devices     Peripheral Intravenous Line            Peripheral IV 12/04/18 Left Forearm 3 days    Long-Dwell Peripheral IV (Midline) 35/05/70 Left Basilic 1 day                Physical Exam:  /81 (BP Location: Right arm)   Pulse 60   Temp 98 4 °F (36 9 °C) (Oral)   Resp 18   Ht 5' 7" (1 702 m)   Wt 99 2 kg (218 lb 9 6 oz)   SpO2 96%   BMI 34 24 kg/m²   General appearance: alert and oriented, in no acute distress and alert  Lungs: clear to auscultation bilaterally  Heart: regular rate and rhythm, S1, S2 normal, no murmur, click, rub or gallop  Abdomen: distended abdomen, no specific tenderness  soft       Current Facility-Administered Medications:     acetaminophen (TYLENOL) tablet 650 mg, 650 mg, Oral, Q6H PRN, Manju Staton    ampicillin-sulbactam (UNASYN) 3 g in sodium chloride 0 9 % 100 mL IVPB, 3 g, Intravenous, Q6H, Manju Staton, Last Rate: 200 mL/hr at 12/08/18 0540, 3 g at 12/08/18 0540    dextrose 5 % and sodium chloride 0 9 % with KCl 20 mEq/L infusion (premix), 50 mL/hr, Intravenous, Continuous, Jose Raul Maldonado DO, Last Rate: 50 mL/hr at 12/08/18 0859, 50 mL/hr at 12/08/18 0859    docusate sodium (COLACE) capsule 100 mg, 100 mg, Oral, BID PRN, Manju Staton    enoxaparin (LOVENOX) subcutaneous injection 40 mg, 40 mg, Subcutaneous, Q24H [facility], Esther Pepper PA-C, 40 mg at 12/08/18 0858    HYDROmorphone (DILAUDID) injection 0 5 mg, 0 5 mg, Intravenous, Q4H PRN, Ana Annas, 0 5 mg at 12/04/18 1122    losartan (COZAAR) tablet 50 mg, 50 mg, Oral, Daily, CLAYTON BURNS, 50 mg at 12/08/18 0859    methylPREDNISolone sodium succinate (Solu-MEDROL) injection 20 mg, 20 mg, Intravenous, Q8H, Esther Pepper PA-C, 20 mg at 12/08/18 0858    ondansetron (ZOFRAN) injection 4 mg, 4 mg, Intravenous, Q4H PRN, Ana Carpenter    oxyCODONE-acetaminophen (PERCOCET) 5-325 mg per tablet 1 tablet, 1 tablet, Oral, Q4H PRN, Ana Carpenter, 1 tablet at 12/05/18 1625    potassium chloride (K-DUR,KLOR-CON) CR tablet 10 mEq, 10 mEq, Oral, BID, CLAYTON BURNS, 10 mEq at 12/08/18 0858           Lab, Imaging and other studies:  CBC:   Lab Results   Component Value Date    WBC 10 53 (H) 12/08/2018    HGB 9 4 (L) 12/08/2018    HCT 28 4 (L) 12/08/2018    MCV 93 12/08/2018     (H) 12/08/2018    MCH 30 9 12/08/2018    MCHC 33 1 12/08/2018    RDW 13 8 12/08/2018    MPV 8 4 (L) 12/08/2018    NRBC 0 12/08/2018   , CMP:   Lab Results   Component Value Date    SODIUM 139 12/08/2018    K 4 5 12/08/2018     12/08/2018    CO2 28 12/08/2018    BUN 13 12/08/2018    CREATININE 0 83 12/08/2018    CALCIUM 7 8 (L) 12/08/2018    EGFR 100 12/08/2018         VTE Pharmacologic Prophylaxis: Sequential compression device (Venodyne)  and Enoxaparin (Lovenox)  VTE Mechanical Prophylaxis: sequential compression device    Rounds performed with nursing

## 2018-12-09 VITALS
OXYGEN SATURATION: 96 % | WEIGHT: 218.5 LBS | SYSTOLIC BLOOD PRESSURE: 125 MMHG | DIASTOLIC BLOOD PRESSURE: 75 MMHG | HEART RATE: 88 BPM | BODY MASS INDEX: 34.29 KG/M2 | HEIGHT: 67 IN | TEMPERATURE: 98 F | RESPIRATION RATE: 18 BRPM

## 2018-12-09 RX ORDER — MESALAMINE 400 MG/1
1600 CAPSULE, DELAYED RELEASE ORAL 3 TIMES DAILY
Qty: 360 CAPSULE | Refills: 0 | Status: SHIPPED | OUTPATIENT
Start: 2018-12-09 | End: 2019-01-18

## 2018-12-09 RX ORDER — PREDNISONE 10 MG/1
10 TABLET ORAL DAILY
Qty: 140 TABLET | Refills: 0 | Status: SHIPPED | OUTPATIENT
Start: 2018-12-09 | End: 2019-03-22 | Stop reason: ALTCHOICE

## 2018-12-09 RX ORDER — PREDNISONE 1 MG/1
5 TABLET ORAL DAILY
Qty: 35 TABLET | Refills: 0 | Status: SHIPPED | OUTPATIENT
Start: 2018-12-09 | End: 2019-03-22 | Stop reason: ALTCHOICE

## 2018-12-09 RX ADMIN — POTASSIUM CHLORIDE, DEXTROSE MONOHYDRATE AND SODIUM CHLORIDE 50 ML/HR: 150; 5; 900 INJECTION, SOLUTION INTRAVENOUS at 05:35

## 2018-12-09 RX ADMIN — PREDNISONE 50 MG: 20 TABLET ORAL at 08:12

## 2018-12-09 RX ADMIN — MESALAMINE 1600 MG: 400 CAPSULE, DELAYED RELEASE ORAL at 08:13

## 2018-12-09 RX ADMIN — LOSARTAN POTASSIUM 50 MG: 50 TABLET, FILM COATED ORAL at 08:12

## 2018-12-09 NOTE — PLAN OF CARE
Problem: Potential for Falls  Goal: Patient will remain free of falls  INTERVENTIONS:  - Assess patient frequently for physical needs  -  Identify cognitive and physical deficits and behaviors that affect risk of falls    -  [place] fall precautions as indicated by assessment   - Educate patient/family on patient safety including physical limitations  - Instruct patient to call for assistance with activity based on assessment  - Modify environment to reduce risk of injury  - Consider OT/PT consult to assist with strengthening/mobility   Outcome: Completed Date Met: 12/09/18      Problem: PAIN - ADULT  Goal: Verbalizes/displays adequate comfort level or baseline comfort level  Interventions:  - Encourage patient to monitor pain and request assistance  - Assess pain using appropriate pain scale  - Administer analgesics based on type and severity of pain and evaluate response  - Implement non-pharmacological measures as appropriate and evaluate response  - Consider cultural and social influences on pain and pain management  - Notify physician/advanced practitioner if interventions unsuccessful or patient reports new pain   Outcome: Completed Date Met: 12/09/18      Problem: INFECTION - ADULT  Goal: Absence or prevention of progression during hospitalization  INTERVENTIONS:  - Assess and monitor for signs and symptoms of infection  - Monitor lab/diagnostic results  - Monitor all insertion sites, i e  indwelling lines, tubes, and drains  - Monitor endotracheal (as able) and nasal secretions for changes in amount and color  - [place] appropriate cooling/warming therapies per order  - Administer medications as ordered  - Instruct and encourage patient and family to use good hand hygiene technique  - Identify and instruct in appropriate isolation precautions for identified infection/condition   Outcome: Completed Date Met: 12/09/18    Goal: Absence of fever/infection during neutropenic period  INTERVENTIONS:  - Monitor WBC  - Implement neutropenic guidelines   Outcome: Progressing      Problem: SAFETY ADULT  Goal: Maintain or return to baseline ADL function  INTERVENTIONS:  -  Assess patient's ability to carry out ADLs; assess patient's baseline for ADL function and identify physical deficits which impact ability to perform ADLs (bathing, care of mouth/teeth, toileting, grooming, dressing, etc )  - Assess/evaluate cause of self-care deficits   - Assess range of motion  - Assess patient's mobility; develop plan if impaired  - Assess patient's need for assistive devices and provide as appropriate  - Encourage maximum independence but intervene and supervise when necessary  ¯ Involve family in performance of ADLs  ¯ Assess for home care needs following discharge   ¯ Request OT consult to assist with ADL evaluation and planning for discharge  ¯ Provide patient education as appropriate   Outcome: Completed Date Met: 12/09/18

## 2018-12-09 NOTE — PROGRESS NOTES
Progress Note -Surgery ELOY Kwok 46 y o  male MRN: [id number]  Unit/Bed#: -01 Encounter: [id number]      Assessment:  46year old male with colitis   Plan:  -dc today with prednisone taper and mesalamine  -follow up with GI as outpatient  Subjective/Objective     Subjective: Feels well  Had BM  Tolerating diet    Objective:     /75 (BP Location: Right arm)   Pulse 88   Temp 98 °F (36 7 °C) (Oral)   Resp 18   Ht 5' 7" (1 702 m)   Wt 99 1 kg (218 lb 8 oz)   SpO2 96%   BMI 34 22 kg/m²   I/O last 24 hours:   In: 250 [P O :250]  Out: 325 [Urine:325]        Intake/Output Summary (Last 24 hours) at 12/09/18 0919  Last data filed at 12/09/18 0600   Gross per 24 hour   Intake              250 ml   Output              325 ml   Net              -75 ml       Invasive Devices     Peripheral Intravenous Line            Long-Dwell Peripheral IV (Midline) 86/05/68 Left Basilic 2 days                Physical Exam:  /75 (BP Location: Right arm)   Pulse 88   Temp 98 °F (36 7 °C) (Oral)   Resp 18   Ht 5' 7" (1 702 m)   Wt 99 1 kg (218 lb 8 oz)   SpO2 96%   BMI 34 22 kg/m²   General appearance: alert and oriented, in no acute distress and alert  Lungs: clear to auscultation bilaterally  Heart: regular rate and rhythm, S1, S2 normal, no murmur, click, rub or gallop  Abdomen: soft, non-tender; bowel sounds normal; no masses,  no organomegaly      Current Facility-Administered Medications:     acetaminophen (TYLENOL) tablet 650 mg, 650 mg, Oral, Q6H PRN, Manju Staton    dextrose 5 % and sodium chloride 0 9 % with KCl 20 mEq/L infusion (premix), 50 mL/hr, Intravenous, Continuous, Jose Raul Maldonado DO, Last Rate: 50 mL/hr at 12/09/18 0535, 50 mL/hr at 12/09/18 0535    docusate sodium (COLACE) capsule 100 mg, 100 mg, Oral, BID PRN, Ana Carpenter    enoxaparin (LOVENOX) subcutaneous injection 40 mg, 40 mg, Subcutaneous, Q24H CARLOTA, Esther Pepper PA-C, 40 mg at 12/08/18 0858    HYDROmorphone (DILAUDID) injection 0 5 mg, 0 5 mg, Intravenous, Q4H PRN, Ana Annas, 0 5 mg at 12/04/18 1122    losartan (COZAAR) tablet 50 mg, 50 mg, Oral, Daily, [employer], PA-C, 50 mg at 12/09/18 0846    mesalamine (DELZICOL) delayed release capsule 1,600 mg, 1,600 mg, Oral, TID, Meipoli Pepper, PA-C, 1,600 mg at 12/09/18 0813    ondansetron (ZOFRAN) injection 4 mg, 4 mg, Intravenous, Q4H PRN, Ana Annas    oxyCODONE-acetaminophen (PERCOCET) 5-325 mg per tablet 1 tablet, 1 tablet, Oral, Q4H PRN, Ana Carpenter, 1 tablet at 12/05/18 1625    potassium chloride (K-DUR,KLOR-CON) CR tablet 10 mEq, 10 mEq, Oral, BID, [employer], PA-C, 10 mEq at 12/08/18 1734    predniSONE tablet 50 mg, 50 mg, Oral, Daily, Meipoli Cheos, PA-C, 50 mg at 12/09/18 6467           Lab, Imaging and other studies:CBC: No results found for: WBC, HGB, HCT, MCV, PLT, ADJUSTEDWBC, MCH, MCHC, RDW, MPV, NRBC, CMP: No results found for: SODIUM, K, CL, CO2, ANIONGAP, BUN, CREATININE, GLUCOSE, CALCIUM, AST, ALT, ALKPHOS, PROT, BILITOT, EGFR      VTE Pharmacologic Prophylaxis: Sequential compression device (Venodyne)  and Enoxaparin (Lovenox)  VTE Mechanical Prophylaxis: sequential compression device    Rounds performed with nursing

## 2018-12-10 ENCOUNTER — TRANSITIONAL CARE MANAGEMENT (OUTPATIENT)
Dept: FAMILY MEDICINE CLINIC | Facility: CLINIC | Age: 53
End: 2018-12-10

## 2018-12-11 ENCOUNTER — TRANSITIONAL CARE MANAGEMENT (OUTPATIENT)
Dept: FAMILY MEDICINE CLINIC | Facility: CLINIC | Age: 53
End: 2018-12-11

## 2018-12-11 ENCOUNTER — TELEPHONE (OUTPATIENT)
Dept: FAMILY MEDICINE CLINIC | Facility: CLINIC | Age: 53
End: 2018-12-11

## 2018-12-11 NOTE — TELEPHONE ENCOUNTER
Patient scheduled 12/13/18 at 2:45 PM for [facility] / [address] [place] [address], Admitted: 12/3/18, Discharged: 12/9/18, Dx: Diverticulitis     As per Dr Ashley Ambrocio

## 2018-12-11 NOTE — DISCHARGE SUMMARY
Discharge Summary - Zeina Angel 46 y o  male MRN: [id number]    Unit/Bed#: -01 Encounter: [id number]    Admission Date: 12/2/2018   Discharge Date: 12/10/2018    Admitting Diagnosis:   Abdominal pain [R10 9]    Discharge Diagnoses: Principal Problem:    Colitis with rectal bleeding  Active Problems:    Essential hypertension    Generalized abdominal pain    Hypokalemia    Abdominal distension    Colitis      Consultations: GI    Procedures Performed: none    Hospital Course: Patient came to the ED with intermittent pain and diarrhea over the last month  He also had abdominal distention and rectal bleeding  Workup in ED showed nonspecific diffuse inflammatory or infectious colitis so he was admitted to the surgical service for further treatment  He was placed on antibiotics and GI was consulted who ordered stool cultures, and placed the patient on steroids  He was kept NPO until his pain improved  He had a flex sig with GI that showed dusky right sided colonic mucosa  His labs were monitored closely  When his pain improved his diet as slowly advanced as tolerated  Stool cultures were negative  He remained on antibiotics during the course of his hospital stay and they were stopped at discharge  His steroid taper continued at discharge and he was discharged in stable condition with the directions to follow up with GI as an outpatient  Condition at Discharge: fair     Discharge instructions/Information to patient and family:   See after visit summary for information provided to patient and family  Provisions for Follow-Up Care:  See after visit summary for information related to follow-up care and any pertinent home health orders  Disposition: Home    Planned Readmission: No    Discharge Statement   I spent 30 minutes discharging the patient  This time was spent on the day of discharge  I had direct contact with the patient on the day of discharge   Additional documentation is required if more than 30 minutes were spent on discharge  Discharge Medications:  See after visit summary for reconciled discharge medications provided to patient and family

## 2018-12-12 ENCOUNTER — TRANSITIONAL CARE MANAGEMENT (OUTPATIENT)
Dept: FAMILY MEDICINE CLINIC | Facility: CLINIC | Age: 53
End: 2018-12-12

## 2018-12-13 ENCOUNTER — APPOINTMENT (OUTPATIENT)
Dept: LAB | Facility: CLINIC | Age: 53
End: 2018-12-13
Payer: COMMERCIAL

## 2018-12-13 ENCOUNTER — TRANSCRIBE ORDERS (OUTPATIENT)
Dept: LAB | Facility: CLINIC | Age: 53
End: 2018-12-13

## 2018-12-13 ENCOUNTER — TELEPHONE (OUTPATIENT)
Dept: GASTROENTEROLOGY | Facility: AMBULARY SURGERY CENTER | Age: 53
End: 2018-12-13

## 2018-12-13 ENCOUNTER — OFFICE VISIT (OUTPATIENT)
Dept: FAMILY MEDICINE CLINIC | Facility: CLINIC | Age: 53
End: 2018-12-13
Payer: COMMERCIAL

## 2018-12-13 VITALS
RESPIRATION RATE: 15 BRPM | HEIGHT: 67 IN | WEIGHT: 195 LBS | SYSTOLIC BLOOD PRESSURE: 128 MMHG | TEMPERATURE: 98.2 F | OXYGEN SATURATION: 98 % | BODY MASS INDEX: 30.61 KG/M2 | DIASTOLIC BLOOD PRESSURE: 82 MMHG | HEART RATE: 91 BPM

## 2018-12-13 DIAGNOSIS — K52.9 COLITIS WITH RECTAL BLEEDING: ICD-10-CM

## 2018-12-13 DIAGNOSIS — D50.8 OTHER IRON DEFICIENCY ANEMIA: ICD-10-CM

## 2018-12-13 DIAGNOSIS — Z09 HOSPITAL DISCHARGE FOLLOW-UP: Primary | ICD-10-CM

## 2018-12-13 DIAGNOSIS — K62.5 COLITIS WITH RECTAL BLEEDING: ICD-10-CM

## 2018-12-13 DIAGNOSIS — E87.6 HYPOKALEMIA: ICD-10-CM

## 2018-12-13 DIAGNOSIS — I10 ESSENTIAL HYPERTENSION: ICD-10-CM

## 2018-12-13 DIAGNOSIS — K51.811 OTHER ULCERATIVE COLITIS WITH RECTAL BLEEDING (HCC): ICD-10-CM

## 2018-12-13 PROBLEM — K51.90 ULCERATIVE COLITIS (HCC): Status: ACTIVE | Noted: 2018-12-13

## 2018-12-13 LAB
ALBUMIN SERPL BCP-MCNC: 2.5 G/DL (ref 3.5–5)
ALP SERPL-CCNC: 120 U/L (ref 46–116)
ALT SERPL W P-5'-P-CCNC: 84 U/L (ref 12–78)
ANION GAP SERPL CALCULATED.3IONS-SCNC: 9 MMOL/L (ref 4–13)
AST SERPL W P-5'-P-CCNC: 28 U/L (ref 5–45)
BASOPHILS # BLD AUTO: 0.03 THOUSANDS/ΜL (ref 0–0.1)
BASOPHILS NFR BLD AUTO: 0 % (ref 0–1)
BILIRUB SERPL-MCNC: 0.2 MG/DL (ref 0.2–1)
BUN SERPL-MCNC: 21 MG/DL (ref 5–25)
CALCIUM SERPL-MCNC: 8.6 MG/DL (ref 8.3–10.1)
CHLORIDE SERPL-SCNC: 102 MMOL/L (ref 100–108)
CO2 SERPL-SCNC: 27 MMOL/L (ref 21–32)
CREAT SERPL-MCNC: 1.06 MG/DL (ref 0.6–1.3)
EOSINOPHIL # BLD AUTO: 0.01 THOUSAND/ΜL (ref 0–0.61)
EOSINOPHIL NFR BLD AUTO: 0 % (ref 0–6)
ERYTHROCYTE [DISTWIDTH] IN BLOOD BY AUTOMATED COUNT: 14.3 % (ref 11.6–15.1)
GFR SERPL CREATININE-BSD FRML MDRD: 79 ML/MIN/1.73SQ M
GLUCOSE SERPL-MCNC: 122 MG/DL (ref 65–140)
HCT VFR BLD AUTO: 35 % (ref 36.5–49.3)
HGB BLD-MCNC: 11.1 G/DL (ref 12–17)
IMM GRANULOCYTES # BLD AUTO: 0.15 THOUSAND/UL (ref 0–0.2)
IMM GRANULOCYTES NFR BLD AUTO: 1 % (ref 0–2)
LYMPHOCYTES # BLD AUTO: 0.97 THOUSANDS/ΜL (ref 0.6–4.47)
LYMPHOCYTES NFR BLD AUTO: 7 % (ref 14–44)
MCH RBC QN AUTO: 30.6 PG (ref 26.8–34.3)
MCHC RBC AUTO-ENTMCNC: 31.7 G/DL (ref 31.4–37.4)
MCV RBC AUTO: 96 FL (ref 82–98)
MONOCYTES # BLD AUTO: 0.45 THOUSAND/ΜL (ref 0.17–1.22)
MONOCYTES NFR BLD AUTO: 3 % (ref 4–12)
NEUTROPHILS # BLD AUTO: 12.98 THOUSANDS/ΜL (ref 1.85–7.62)
NEUTS SEG NFR BLD AUTO: 89 % (ref 43–75)
NRBC BLD AUTO-RTO: 0 /100 WBCS
PLATELET # BLD AUTO: 602 THOUSANDS/UL (ref 149–390)
PMV BLD AUTO: 8.5 FL (ref 8.9–12.7)
POTASSIUM SERPL-SCNC: 4.7 MMOL/L (ref 3.5–5.3)
PROT SERPL-MCNC: 6.8 G/DL (ref 6.4–8.2)
RBC # BLD AUTO: 3.63 MILLION/UL (ref 3.88–5.62)
SODIUM SERPL-SCNC: 138 MMOL/L (ref 136–145)
WBC # BLD AUTO: 14.59 THOUSAND/UL (ref 4.31–10.16)

## 2018-12-13 PROCEDURE — 1111F DSCHRG MED/CURRENT MED MERGE: CPT | Performed by: FAMILY MEDICINE

## 2018-12-13 PROCEDURE — 80053 COMPREHEN METABOLIC PANEL: CPT | Performed by: FAMILY MEDICINE

## 2018-12-13 PROCEDURE — 99496 TRANSJ CARE MGMT HIGH F2F 7D: CPT | Performed by: FAMILY MEDICINE

## 2018-12-13 PROCEDURE — 85025 COMPLETE CBC W/AUTO DIFF WBC: CPT

## 2018-12-13 PROCEDURE — 36415 COLL VENOUS BLD VENIPUNCTURE: CPT | Performed by: FAMILY MEDICINE

## 2018-12-13 NOTE — TELEPHONE ENCOUNTER
----- Message from Liyah Snider MD sent at 12/9/2018  4:22 PM EST -----  Please inform the patient that the biopsies were suggestive of ulcerative colitis  He needs a follow-up in the office in two weeks  Repeat colonoscopy in 1 year

## 2018-12-13 NOTE — LETTER
December 13, 2018    [facility] [address] [address]      Dear Mr Aldo Turner:        We have attempted to contact you regarding your results  Please contact our office upon receipt of this letter            Sincerely,             Dr Chetna [facility]

## 2018-12-13 NOTE — PROGRESS NOTES
Assessment/Plan:          Diagnoses and all orders for this visit:    Hospital discharge follow-up    Colitis with rectal bleeding    Essential hypertension    Hypokalemia  -     Basic metabolic panel; Future    Other ulcerative colitis with rectal bleeding (HCC)    Other iron deficiency anemia  -     CBC and differential; Future     to f/u with GI  To recheck CBC  To continue mesalamine and prednisone taper       Subjective:     Patient ID: Hali Vasquez is a 46 y o  male  HPI   Here to /u hospital   Was admitted on 12/10/18 for diarrhea with rectal bleeding for 1 month  CT scan showed infectious colitis and was admitted under surgical service  GI was consulted and a flex sig showed dusky right sided colonic mucosa  Stool culture was negative for C  Diff  Biopsy confirmed ulcerative colitis and was sent home with steroid taper  Patient is still feeling tired today and no energy     Review of Systems   Constitutional: Positive for fatigue  Negative for fever  HENT: Negative  Eyes: Negative  Respiratory: Negative  Cardiovascular: Negative  Gastrointestinal: Negative  Endocrine: Negative  Genitourinary: Negative  Musculoskeletal: Negative  Neurological: Negative  Hematological: Negative  Psychiatric/Behavioral: Negative  Objective:     Physical Exam   Constitutional: He appears well-developed  HENT:   Head: Normocephalic  Mouth/Throat: No oropharyngeal exudate  Eyes: Pupils are equal, round, and reactive to light  Right eye exhibits no discharge  Left eye exhibits no discharge  No scleral icterus  Cardiovascular: Normal rate and regular rhythm  Pulmonary/Chest: Effort normal and breath sounds normal    Abdominal: He exhibits no distension  There is no tenderness  There is no rebound  Musculoskeletal: He exhibits no edema or tenderness  Neurological: He is alert  No cranial nerve deficit  Coordination normal    Skin: He is not diaphoretic     Psychiatric: He has a normal mood and affect  His behavior is normal          Vitals:    12/13/18 1432   BP: 128/82   BP Location: Left arm   Patient Position: Sitting   Cuff Size: Standard   Pulse: 91   Resp: 15   Temp: 98 2 °F (36 8 °C)   SpO2: 98%   Weight: 88 5 kg (195 lb)   Height: 5' 7" (1 702 m)       Transitional Care Management Review:  Erik Le is a 46 y o  male here for TCM follow up       During the TCM phone call patient stated:    TCM Call (since 11/12/2018)     Date and time call was made  12/11/2018 12:32 PM    Patient was hospitialized at  [facility]    Date of Admission  12/03/18    Date of discharge  12/09/18    Diagnosis  colitis    Disposition  Home    Current Symptoms  None      TCM Call (since 11/12/2018)     Post hospital issues  None    Did you obtain your prescribed medications  Yes    Do you need help managing your prescriptions or medications  No    Is transportation to your appointment needed  No    I have advised the patient to call PCP with any new or worsening symptoms  Lawrance Castleman, MA Refugia Laura, MD

## 2018-12-17 ENCOUNTER — TELEPHONE (OUTPATIENT)
Dept: FAMILY MEDICINE CLINIC | Facility: CLINIC | Age: 53
End: 2018-12-17

## 2018-12-17 NOTE — TELEPHONE ENCOUNTER
Patient called back and wanted to know If you can write him a work note to go back to work Wednesday full duty   Please advise

## 2018-12-19 NOTE — UTILIZATION REVIEW
Notification of Discharge  This is a Notification of Discharge from our facility [address]  Please be advised that this patient has been discharge from our facility  Below you will find the admission and discharge date and time including the patients disposition  PRESENTATION DATE: 12/2/2018  8:00 PM  IP ADMISSION DATE: 12/3/18 0236  DISCHARGE DATE: 12/9/2018 11:16 AM  DISPOSITION: Home/Self Care    [address]  Utilization Review Department  Phone: [phone]; Fax [phone]  ATTENTION: Please call with any questions or concerns to [phone]  and carefully listen to the prompts so that you are directed to the right person  Send all requests for admission clinical reviews, approved or denied determinations and any other requests to fax [phone]   All voicemails are confidential   Reference #[id number]

## 2018-12-23 DIAGNOSIS — I10 ESSENTIAL HYPERTENSION: Primary | ICD-10-CM

## 2018-12-25 RX ORDER — LOSARTAN POTASSIUM 50 MG/1
TABLET ORAL
Qty: 90 TABLET | Refills: 0 | Status: SHIPPED | OUTPATIENT
Start: 2018-12-25 | End: 2019-03-26 | Stop reason: SDUPTHER

## 2019-01-17 ENCOUNTER — OFFICE VISIT (OUTPATIENT)
Dept: GASTROENTEROLOGY | Facility: AMBULARY SURGERY CENTER | Age: 54
End: 2019-01-17
Payer: COMMERCIAL

## 2019-01-17 ENCOUNTER — TELEPHONE (OUTPATIENT)
Dept: GASTROENTEROLOGY | Facility: AMBULARY SURGERY CENTER | Age: 54
End: 2019-01-17

## 2019-01-17 VITALS
HEIGHT: 67 IN | BODY MASS INDEX: 31.58 KG/M2 | RESPIRATION RATE: 16 BRPM | HEART RATE: 70 BPM | SYSTOLIC BLOOD PRESSURE: 116 MMHG | DIASTOLIC BLOOD PRESSURE: 70 MMHG | TEMPERATURE: 98 F | WEIGHT: 201.2 LBS

## 2019-01-17 DIAGNOSIS — K51.30 ULCERATIVE RECTOSIGMOIDITIS WITHOUT COMPLICATION (HCC): Primary | ICD-10-CM

## 2019-01-17 DIAGNOSIS — K64.9 BLEEDING HEMORRHOID: ICD-10-CM

## 2019-01-17 PROCEDURE — 99214 OFFICE O/P EST MOD 30 MIN: CPT | Performed by: PHYSICIAN ASSISTANT

## 2019-01-17 RX ORDER — PREDNISONE 10 MG/1
10 TABLET ORAL DAILY
Qty: 28 TABLET | Refills: 0 | Status: SHIPPED | OUTPATIENT
Start: 2019-01-17 | End: 2019-03-22 | Stop reason: ALTCHOICE

## 2019-01-17 RX ORDER — PREDNISONE 10 MG/1
5 TABLET ORAL DAILY
Qty: 14 TABLET | Refills: 0 | Status: SHIPPED | OUTPATIENT
Start: 2019-01-17 | End: 2019-03-22 | Stop reason: ALTCHOICE

## 2019-01-17 NOTE — LETTER
January 17, 2019     Gabriela Teresa MD  [address]  [address] [place], [place] [address]  [address]    Patient: Mary Anne Wood   YOB: 1965   Date of Visit: 1/17/2019       Dear Dr Lundberg Board:    Thank you for referring Dennie Learn to me for evaluation  Below are my notes for this consultation  If you have questions, please do not hesitate to call me  I look forward to following your patient along with you  Sincerely,        Doug Obrien PA-C        CC: No Recipients  Doug Obrien PA-C  1/17/2019 10:31 AM  Sign at close encounter  Assessment and Plan    #1  Left sided ulcerative colitis: new diagnosis as onf December  Had colonoscopy which showed ulcerations from rectum to transverse colon and a dusky appearing ascending colon  Was evaluated by surgery  Sed rate was 38, CRP>90, fecal calpro 486  Patient currently is asymptomatic from a UC standpoint  On 25 mg daily of steroids and mesalamine which he ran out of a few days ago  -Complete steroid taper, tapering down 5 mg daily  Provided two written scripts for 10 mg and 5 mg for patient in case he needs more pills to complete this   -Will renew mesalamine  Will call pharmacy/insurance to determine which mesalamine is cheapest (delzicol was 100 dollars for 30 day supply)   -Discussed that he will need to remain on mesalamine for maintenance of his UC   -Patient was informed to call right away if he has any recurrence of abdominal pain, diarrhea, bloody stools as he continues to taper off his steroids    -Lo residue diet  -Avoid excessive NSAIDs and alcohol    -Can continue ASA 81 mg as long as he is not bleeding    -Discussed increased risk of colon cancer with diagnosis  Needs repeat colonoscopy in 1 year    -At next visit, will check TPMT, hepatitis panel, and Quantiferon gold   Did not check at this visit due to patient being on steroids and risk for indeterminate Quantiferon    -Discussed with patient the possibility of needing to escalate therapy to biologic agents if symptoms are not controlled on oral therapies  -will check a sed rate and CRP at this time to document improvement on therapies   -Follow up in 3 months with physician  Call with any worsening symptoms in the meantime  #2   Bleeding hemorrhoid: reports hemorrhoidal irritation and pressure last month  Today he had blood in his underwear upon waking up and blood drops in the shower but had a normal formed stool without blood  suspect ruptured hemorrhoid  No pain currently   -Advised patient to continue to monitor  -If bleeding is excessive, he develops CP, SOB, lightheadedness I advised he go to ED  -Can use OTC suppositories  -If bleeding becomes a chronic problem, can refer to colorectal for removal of hemorrhoids  --------------------------------------------------------------------------------------------------------------------    Chief Complaint:   Follow-up ulcerative colitis, bleeding hemorrhoid    HPI: Lyla Marsh is a 53 y o  male who presents today for follow up for ulcerative colitis, bleeding hemorrhoid  The patient was recently diagnosed with ulcerative colitis during a hospitalization as beginning of December  Prior to that he had been treated twice for what was presumed to be diverticulitis  However on his 3rd presentation, he underwent a flexible sigmoidoscopy due to worsening colitis on CT scan as well as gassy is distention of the colon  This showed hemorrhagic ulcers from the rectum to the transverse colon with a dusky ascending colon  He was evaluated by surgery but did not require any surgical intervention at that time  He was placed on a steroid taper as well as mesalamine and was ultimately discharged home  Since his discharge, the patient reports that he has been doing very well  He denies any significant GI symptoms  He denies any abdominal pain  He reports his bowel movements have been normal, formed, and brown  He denies any blood in the stool    He denies any diarrhea  He denies any nausea or vomiting  He is currently taking 25 mg a day of steroids and is about correction through his taper  He completed a 30 day course of mesalamine which she reports was 100 dollars  This was completed a few days ago so he is completely out of this medication at this time as he did not have any refills  The patient also reports that last week he was having some hemorrhoidal discomfort and pressure especially when sitting down  He reports that this morning when he woke up from his sleep he had some blood in his underwear  He reports he then had a normal formed bowel movement without any signs of bleeding  He then took a shower and had a few more drops of blood in the shower and on the towel  The blood was bright red and he denies any black tarry stools  Patient denies any unexpected weight loss, diarrhea, constipation, blood in stool, or black tarry stools  Patient will be due for repeat colonoscopy in 1 year  He denies any significant alcohol use or NSAID use  He does take a baby aspirin daily but did not take it this morning secondary to the rectal bleeding      Review of Systems:   General: negative for fatigue, fever, night sweats or unexpected weight loss  Psychological: negative for anxiety or depression  Ophthalmic: negative for blurry vision or scleral icterus  ENT: negative for headaches, oral lesions, sore throat, vocal changes or dysphagia  Hematological and Lymphatic: negative for pallor or swollen lymph nodes  Respiratory: negative for cough, shortness of breath or wheezing  Cardiovascular: negative for chest pain, edema or murmur  Gastrointestinal: as mentioned in HPI  Genito-Urinary: negative for dysuria or incontinence  Musculoskeletal: negative for joint pain, joint stiffness or joint swelling  Dermatological: negative for pruritus, rash, or jaundice    Current Medications  Current Outpatient Prescriptions   Medication Sig Dispense Refill    losartan (COZAAR) 50 mg tablet TAKE 1 TABLET BY MOUTH EVERY DAY AS DIRECTED 90 tablet 0    MULTIPLE VITAMIN PO Take by mouth   predniSONE 10 mg tablet Take 1 tablet (10 mg total) by mouth daily 140 tablet 0    predniSONE 5 mg tablet Take 1 tablet (5 mg total) by mouth daily 35 tablet 0    aspirin 81 MG tablet Take 81 mg by mouth daily   mesalamine (DELZICOL) 400 mg Take 4 capsules (1,600 mg total) by mouth 3 (three) times a day for 30 days 360 capsule 0    ondansetron (ZOFRAN-ODT) 4 mg disintegrating tablet Take 1 tablet (4 mg total) by mouth every 6 (six) hours as needed for nausea or vomiting (Patient not taking: Reported on 1/17/2019 ) 20 tablet 0    potassium chloride (K-DUR,KLOR-CON) 10 mEq tablet Take 1 tablet (10 mEq total) by mouth 2 (two) times a day (Patient not taking: Reported on 1/17/2019 ) 10 tablet 0    predniSONE 10 mg tablet Take 0 5 tablets (5 mg total) by mouth daily To complete steroid taper per other 10 mg script 14 tablet 0    predniSONE 10 mg tablet Take 1 tablet (10 mg total) by mouth daily To complete taper  20 mg daily x 1 week, 15 mg daily x 1 week, 10 mg x 1 week, and 5 mg x 1 week  With other 5mg script  28 tablet 0     No current facility-administered medications for this visit  Past Medical History  Past Medical History:   Diagnosis Date    Hypertension        Past Surgical History  Past Surgical History:   Procedure Laterality Date    CARDIAC CATHETERIZATION      MI COLONOSCOPY FLX DX W/COLLJ SPEC WHEN PFRMD N/A 4/25/2016    Procedure: COLONOSCOPY;  Surgeon: Jyoti Llamas MD;  [facility]: [facility]; Service: Gastroenterology    MI SIGMOIDOSCOPY FLX DX W/COLLJ SPEC BR/WA IF PFRMD N/A 12/4/2018    Procedure: Rachid Griffin;  Surgeon: Irene So MD;  Location: [facility];   Service: Gastroenterology       Past Social History   Social History     Social History    Marital status: /Civil Union     Spouse name: N/A    Number of children: N/A  Years of education: N/A     Social History Main Topics    Smoking status: Former Smoker    Smokeless tobacco: Current User    Alcohol use Yes      Comment: social    Drug use: No    Sexual activity: Not Asked     Other Topics Concern    None     Social History Narrative    Caffeine use: 2 servings daily           The following portions of the patient's history were reviewed and updated as appropriate: allergies, current medications, past family history, past medical history, past social history, past surgical history and problem list     Vital Signs  Vitals:    01/17/19 0936   BP: 116/70   BP Location: Left arm   Patient Position: Sitting   Cuff Size: Standard   Pulse: 70   Resp: 16   Temp: 98 °F (36 7 °C)   TempSrc: Tympanic   Weight: 91 3 kg (201 lb 3 2 oz)   Height: 5' 7" (1 702 m)       Physical Exam:  General appearance: alert, cooperative, no distress  HEENT: normocephalic, anicteric, no eye erythema or discharge, no oropharyngeal thrush  Neck: supple, trachea midline, no adenopathy  Lungs: CTA b/l, no rales, rhonchi, or wheezing, unlabored respirations  Heart: RRR, no murmur, rubs, or gallops  Abdomen: soft, obese, non-tender, non-distended, normal bowel sounds, no masses or organomegaly  Rectal: deferred  Extremities: no cyanosis, clubbing, or edema  Musculoskeletal: normal gait  Skin: color and texture normal, no jaundice, no rashes or lesions  Psychiatric: alert and oriented, normal affect and behavior

## 2019-01-17 NOTE — PROGRESS NOTES
Assessment and Plan    #1  Left sided ulcerative colitis: new diagnosis as onf December  Had colonoscopy which showed ulcerations from rectum to transverse colon and a dusky appearing ascending colon  Was evaluated by surgery  Sed rate was 38, CRP>90, fecal calpro 486  Patient currently is asymptomatic from a UC standpoint  On 25 mg daily of steroids and mesalamine which he ran out of a few days ago  -Complete steroid taper, tapering down 5 mg daily  Provided two written scripts for 10 mg and 5 mg for patient in case he needs more pills to complete this   -Will renew mesalamine  Will call pharmacy/insurance to determine which mesalamine is cheapest (delzicol was 100 dollars for 30 day supply)   -Discussed that he will need to remain on mesalamine for maintenance of his UC   -Patient was informed to call right away if he has any recurrence of abdominal pain, diarrhea, bloody stools as he continues to taper off his steroids    -Lo residue diet  -Avoid excessive NSAIDs and alcohol    -Can continue ASA 81 mg as long as he is not bleeding    -Discussed increased risk of colon cancer with diagnosis  Needs repeat colonoscopy in 1 year    -At next visit, will check TPMT, hepatitis panel, and Quantiferon gold  Did not check at this visit due to patient being on steroids and risk for indeterminate Quantiferon    -Discussed with patient the possibility of needing to escalate therapy to biologic agents if symptoms are not controlled on oral therapies  -will check a sed rate and CRP at this time to document improvement on therapies   -Follow up in 3 months with physician  Call with any worsening symptoms in the meantime  #2   Bleeding hemorrhoid: reports hemorrhoidal irritation and pressure last month  Today he had blood in his underwear upon waking up and blood drops in the shower but had a normal formed stool without blood  suspect ruptured hemorrhoid   No pain currently   -Advised patient to continue to monitor  -If bleeding is excessive, he develops CP, SOB, lightheadedness I advised he go to ED  -Can use OTC suppositories  -If bleeding becomes a chronic problem, can refer to colorectal for removal of hemorrhoids  --------------------------------------------------------------------------------------------------------------------    Chief Complaint:   Follow-up ulcerative colitis, bleeding hemorrhoid    HPI: Brittany Bradley is a 53 y o  male who presents today for follow up for ulcerative colitis, bleeding hemorrhoid  The patient was recently diagnosed with ulcerative colitis during a hospitalization as beginning of December  Prior to that he had been treated twice for what was presumed to be diverticulitis  However on his 3rd presentation, he underwent a flexible sigmoidoscopy due to worsening colitis on CT scan as well as gassy is distention of the colon  This showed hemorrhagic ulcers from the rectum to the transverse colon with a dusky ascending colon  He was evaluated by surgery but did not require any surgical intervention at that time  He was placed on a steroid taper as well as mesalamine and was ultimately discharged home  Since his discharge, the patient reports that he has been doing very well  He denies any significant GI symptoms  He denies any abdominal pain  He reports his bowel movements have been normal, formed, and brown  He denies any blood in the stool  He denies any diarrhea  He denies any nausea or vomiting  He is currently taking 25 mg a day of steroids and is about [facility] through his taper  He completed a 30 day course of mesalamine which she reports was 100 dollars  This was completed a few days ago so he is completely out of this medication at this time as he did not have any refills  The patient also reports that last week he was having some hemorrhoidal discomfort and pressure especially when sitting down    He reports that this morning when he woke up from his sleep he had some blood in his underwear  He reports he then had a normal formed bowel movement without any signs of bleeding  He then took a shower and had a few more drops of blood in the shower and on the towel  The blood was bright red and he denies any black tarry stools  Patient denies any unexpected weight loss, diarrhea, constipation, blood in stool, or black tarry stools  Patient will be due for repeat colonoscopy in 1 year  He denies any significant alcohol use or NSAID use  He does take a baby aspirin daily but did not take it this morning secondary to the rectal bleeding  Review of Systems:   General: negative for fatigue, fever, night sweats or unexpected weight loss  Psychological: negative for anxiety or depression  Ophthalmic: negative for blurry vision or scleral icterus  ENT: negative for headaches, oral lesions, sore throat, vocal changes or dysphagia  Hematological and Lymphatic: negative for pallor or swollen lymph nodes  Respiratory: negative for cough, shortness of breath or wheezing  Cardiovascular: negative for chest pain, edema or murmur  Gastrointestinal: as mentioned in HPI  Genito-Urinary: negative for dysuria or incontinence  Musculoskeletal: negative for joint pain, joint stiffness or joint swelling  Dermatological: negative for pruritus, rash, or jaundice    Current Medications  Current Outpatient Prescriptions   Medication Sig Dispense Refill    losartan (COZAAR) 50 mg tablet TAKE 1 TABLET BY MOUTH EVERY DAY AS DIRECTED 90 tablet 0    MULTIPLE VITAMIN PO Take by mouth   predniSONE 10 mg tablet Take 1 tablet (10 mg total) by mouth daily 140 tablet 0    predniSONE 5 mg tablet Take 1 tablet (5 mg total) by mouth daily 35 tablet 0    aspirin 81 MG tablet Take 81 mg by mouth daily        mesalamine (DELZICOL) 400 mg Take 4 capsules (1,600 mg total) by mouth 3 (three) times a day for 30 days 360 capsule 0    ondansetron (ZOFRAN-ODT) 4 mg disintegrating tablet Take 1 tablet (4 mg total) by mouth every 6 (six) hours as needed for nausea or vomiting (Patient not taking: Reported on 1/17/2019 ) 20 tablet 0    potassium chloride (K-DUR,KLOR-CON) 10 mEq tablet Take 1 tablet (10 mEq total) by mouth 2 (two) times a day (Patient not taking: Reported on 1/17/2019 ) 10 tablet 0    predniSONE 10 mg tablet Take 0 5 tablets (5 mg total) by mouth daily To complete steroid taper per other 10 mg script 14 tablet 0    predniSONE 10 mg tablet Take 1 tablet (10 mg total) by mouth daily To complete taper  20 mg daily x 1 week, 15 mg daily x 1 week, 10 mg x 1 week, and 5 mg x 1 week  With other 5mg script  28 tablet 0     No current facility-administered medications for this visit  Past Medical History  Past Medical History:   Diagnosis Date    Hypertension        Past Surgical History  Past Surgical History:   Procedure Laterality Date    CARDIAC CATHETERIZATION      MA COLONOSCOPY FLX DX W/COLLJ SPEC WHEN PFRMD N/A 4/25/2016    Procedure: COLONOSCOPY;  Surgeon: Ashanti Cabrera MD;  Location: [facility]; Service: Gastroenterology    MA SIGMOIDOSCOPY FLX DX W/COLLJ SPEC BR/WA IF PFRMD N/A 12/4/2018    Procedure: Erlene Heap;  Surgeon: Flaquita Gramajo MD;  Location: [facility];   Service: Gastroenterology       Past Social History   Social History     Social History    Marital status: /Civil Union     Spouse name: N/A    Number of children: N/A    Years of education: N/A     Social History Main Topics    Smoking status: Former Smoker    Smokeless tobacco: Current User    Alcohol use Yes      Comment: social    Drug use: No    Sexual activity: Not Asked     Other Topics Concern    None     Social History Narrative    Caffeine use: 2 servings daily           The following portions of the patient's history were reviewed and updated as appropriate: allergies, current medications, past family history, past medical history, past social history, past surgical history and problem list     Vital Signs  Vitals:    01/17/19 0936   BP: 116/70   BP Location: Left arm   Patient Position: Sitting   Cuff Size: Standard   Pulse: 70   Resp: 16   Temp: 98 °F (36 7 °C)   TempSrc: Tympanic   Weight: 91 3 kg (201 lb 3 2 oz)   Height: 5' 7" (1 702 m)       Physical Exam:  General appearance: alert, cooperative, no distress  HEENT: normocephalic, anicteric, no eye erythema or discharge, no oropharyngeal thrush  Neck: supple, trachea midline, no adenopathy  Lungs: CTA b/l, no rales, rhonchi, or wheezing, unlabored respirations  Heart: RRR, no murmur, rubs, or gallops  Abdomen: soft, obese, non-tender, non-distended, normal bowel sounds, no masses or organomegaly  Rectal: deferred  Extremities: no cyanosis, clubbing, or edema  Musculoskeletal: normal gait  Skin: color and texture normal, no jaundice, no rashes or lesions  Psychiatric: alert and oriented, normal affect and behavior

## 2019-01-18 DIAGNOSIS — K51.80 OTHER ULCERATIVE COLITIS WITHOUT COMPLICATION (HCC): Primary | ICD-10-CM

## 2019-01-18 RX ORDER — MESALAMINE 800 MG/1
1600 TABLET, DELAYED RELEASE ORAL 3 TIMES DAILY
Qty: 180 TABLET | Refills: 5 | Status: SHIPPED | OUTPATIENT
Start: 2019-01-18 | End: 2019-07-31 | Stop reason: SDUPTHER

## 2019-01-18 NOTE — TELEPHONE ENCOUNTER
I spoke with Bryant TORRES at [facility]  The covered medications for UC would be sulfasalizine and mesalamine generic  This would have a $15/month copay until pt meeds his medical/pharmacy combined out of [facility] which is $6400  I spoke with Pilar Purcell and she is sending Asacol to [facility]    I will call [employer] around noon to verify the copay would only be $15  Then I will notify patient

## 2019-01-25 ENCOUNTER — APPOINTMENT (OUTPATIENT)
Dept: LAB | Facility: CLINIC | Age: 54
End: 2019-01-25
Payer: COMMERCIAL

## 2019-01-25 DIAGNOSIS — K51.30 ULCERATIVE RECTOSIGMOIDITIS WITHOUT COMPLICATION (HCC): ICD-10-CM

## 2019-01-25 LAB
CRP SERPL QL: 8.4 MG/L
ERYTHROCYTE [SEDIMENTATION RATE] IN BLOOD: 16 MM/HOUR (ref 0–10)

## 2019-01-25 PROCEDURE — 85652 RBC SED RATE AUTOMATED: CPT

## 2019-01-25 PROCEDURE — 36415 COLL VENOUS BLD VENIPUNCTURE: CPT

## 2019-01-25 PROCEDURE — 86140 C-REACTIVE PROTEIN: CPT

## 2019-01-30 ENCOUNTER — TELEPHONE (OUTPATIENT)
Dept: GASTROENTEROLOGY | Facility: AMBULARY SURGERY CENTER | Age: 54
End: 2019-01-30

## 2019-01-30 NOTE — LETTER
January 30, 2019    [address] [place] [address] [address] [id number]            Dear Mr Blaine Schmidt:              We have attempted to reach you regarding your results  Upon receipt of this letter if you can call our office back                Sincerely,  Migue Santiago, [address] [facility]  [phone]

## 2019-01-30 NOTE — TELEPHONE ENCOUNTER
Attempted to contact pt, lvm, letter sent           ----- Message from Tri Tenorio PA-C sent at 1/28/2019  8:34 AM EST -----  Please inform patient that his inflammatory markers are still slightly elevated but are much iproved compared to 1 month ago  Please let me know how he is doing and if he has had any recurrence in symptoms as he is tapering off the steroids   Thanks

## 2019-01-31 NOTE — TELEPHONE ENCOUNTER
It appears he is doing quite well   Please let him know if he feels somewhat constipated he can trial a fiber supplement such as benefiber

## 2019-01-31 NOTE — TELEPHONE ENCOUNTER
Spoke to patient, notified of results  Pt states he is feeling okay, he just isnt having as many bm  He use to go twice a day like clock work  Now he is going once a day, pt states they are solid

## 2019-03-22 ENCOUNTER — OFFICE VISIT (OUTPATIENT)
Dept: FAMILY MEDICINE CLINIC | Facility: CLINIC | Age: 54
End: 2019-03-22
Payer: COMMERCIAL

## 2019-03-22 VITALS
HEIGHT: 67 IN | BODY MASS INDEX: 33.12 KG/M2 | WEIGHT: 211 LBS | OXYGEN SATURATION: 99 % | SYSTOLIC BLOOD PRESSURE: 128 MMHG | HEART RATE: 97 BPM | RESPIRATION RATE: 18 BRPM | TEMPERATURE: 97.7 F | DIASTOLIC BLOOD PRESSURE: 92 MMHG

## 2019-03-22 DIAGNOSIS — I10 ESSENTIAL HYPERTENSION: ICD-10-CM

## 2019-03-22 DIAGNOSIS — Z11.59 ENCOUNTER FOR HEPATITIS C SCREENING TEST FOR LOW RISK PATIENT: ICD-10-CM

## 2019-03-22 DIAGNOSIS — F17.200 TOBACCO DEPENDENCE: ICD-10-CM

## 2019-03-22 DIAGNOSIS — G47.33 OBSTRUCTIVE SLEEP APNEA SYNDROME: ICD-10-CM

## 2019-03-22 DIAGNOSIS — Z00.00 ANNUAL PHYSICAL EXAM: Primary | ICD-10-CM

## 2019-03-22 DIAGNOSIS — Z12.5 PROSTATE CANCER SCREENING: ICD-10-CM

## 2019-03-22 PROBLEM — E87.6 HYPOKALEMIA: Status: RESOLVED | Noted: 2018-12-03 | Resolved: 2019-03-22

## 2019-03-22 PROBLEM — K52.9 COLITIS: Status: RESOLVED | Noted: 2018-12-02 | Resolved: 2019-03-22

## 2019-03-22 PROBLEM — R14.0 ABDOMINAL DISTENSION: Status: RESOLVED | Noted: 2018-12-02 | Resolved: 2019-03-22

## 2019-03-22 PROBLEM — R10.84 GENERALIZED ABDOMINAL PAIN: Status: RESOLVED | Noted: 2018-12-03 | Resolved: 2019-03-22

## 2019-03-22 PROCEDURE — 99396 PREV VISIT EST AGE 40-64: CPT | Performed by: FAMILY MEDICINE

## 2019-03-22 NOTE — PATIENT INSTRUCTIONS
Obesity   AMBULATORY CARE:   Obesity  is when your body mass index (BMI) is greater than 30  Your healthcare provider will use your height and weight to measure your BMI  The risks of obesity include  many health problems, such as injuries or physical disability  You may need tests to check for the following:  · Diabetes     · High blood pressure or high cholesterol     · Heart disease     · Gallbladder or liver disease     · Cancer of the colon, breast, prostate, liver, or kidney     · Sleep apnea     · Arthritis or gout  Seek care immediately if:   · You have a severe headache, confusion, or difficulty speaking  · You have weakness on one side of your body  · You have chest pain, sweating, or shortness of breath  Contact your healthcare provider if:   · You have symptoms of gallbladder or liver disease, such as pain in your upper abdomen  · You have knee or hip pain and discomfort while walking  · You have symptoms of diabetes, such as intense hunger and thirst, and frequent urination  · You have symptoms of sleep apnea, such as snoring or daytime sleepiness  · You have questions or concerns about your condition or care  Treatment for obesity  focuses on helping you lose weight to improve your health  Even a small decrease in BMI can reduce the risk for many health problems  Your healthcare provider will help you set a weight-loss goal   · Lifestyle changes  are the first step in treating obesity  These include making healthy food choices and getting regular physical activity  Your healthcare provider may suggest a weight-loss program that involves coaching, education, and therapy  · Medicine  may help you lose weight when it is used with a healthy diet and physical activity  · Surgery  can help you lose weight if you are very obese and have other health problems  There are several types of weight-loss surgery  Ask your healthcare provider for more information    Be successful losing weight:   · Set small, realistic goals  An example of a small goal is to walk for 20 minutes 5 days a week  Anther goal is to lose 5% of your body weight  · Tell friends, family members, and coworkers about your goals  and ask for their support  Ask a friend to lose weight with you, or join a weight-loss support group  · Identify foods or triggers that may cause you to overeat , and find ways to avoid them  Remove tempting high-calorie foods from your home and workplace  Place a bowl of fresh fruit on your kitchen counter  If stress causes you to eat, then find other ways to cope with stress  · Keep a diary to track what you eat and drink  Also write down how many minutes of physical activity you do each day  Weigh yourself once a week and record it in your diary  Eating changes: You will need to eat 500 to 1,000 fewer calories each day than you currently eat to lose 1 to 2 pounds a week  The following changes will help you cut calories:  · Eat smaller portions  Use small plates, no larger than 9 inches in diameter  Fill your plate half full of fruits and vegetables  Measure your food using measuring cups until you know what a serving size looks like  · Eat 3 meals and 1 or 2 snacks each day  Plan your meals in advance  Genoveva Fischer and eat at home most of the time  Eat slowly  · Eat fruits and vegetables at every meal   They are low in calories and high in fiber, which makes you feel full  Do not add butter, margarine, or cream sauce to vegetables  Use herbs to season steamed vegetables  · Eat less fat and fewer fried foods  Eat more baked or grilled chicken and fish  These protein sources are lower in calories and fat than red meat  Limit fast food  Dress your salads with olive oil and vinegar instead of bottled dressing  · Limit the amount of sugar you eat  Do not drink sugary beverages  Limit alcohol  Activity changes:  Physical activity is good for your body in many ways   It helps you burn calories and build strong muscles  It decreases stress and depression, and improves your mood  It can also help you sleep better  Talk to your healthcare provider before you begin an exercise program   · Exercise for at least 30 minutes 5 days a week  Start slowly  Set aside time each day for physical activity that you enjoy and that is convenient for you  It is best to do both weight training and an activity that increases your heart rate, such as walking, bicycling, or swimming  · Find ways to be more active  Do yard work and housecleaning  Walk up the stairs instead of using elevators  Spend your leisure time going to events that require walking, such as outdoor festivals or fairs  This extra physical activity can help you lose weight and keep it off  Follow up with your healthcare provider as directed: You may need to meet with a dietitian  Write down your questions so you remember to ask them during your visits  © 2017 [employer] Information is for End User's use only and may not be sold, redistributed or otherwise used for commercial purposes  All illustrations and images included in CareNotes® are the copyrighted property of [email or web address] , [employer]  or Philipp Milan  The above information is an  only  It is not intended as medical advice for individual conditions or treatments  Talk to your doctor, nurse or pharmacist before following any medical regimen to see if it is safe and effective for you  Calorie Counting Diet   WHAT YOU NEED TO KNOW:   What is a calorie counting diet? It is a meal plan based on counting calories each day to reach a healthy body weight  You will need to eat fewer calories if you are trying to lose weight  Weight loss may decrease your risk for certain health problems or improve your health if you have health problems  Some of these health problems include heart disease, high blood pressure, and diabetes     What foods should I avoid? Your dietitian will tell you if you need to avoid certain foods based on your body weight and health condition  You may need to avoid high-fat foods if you are at risk for or have heart disease  You may need to eat fewer foods from the breads and starches food group if you have diabetes  How many calories are in foods? The following is a list of foods and drinks with the approximate number of calories in each  Check the food label to find the exact number of calories  A dietitian can tell you how many calories you should have from each food group each day    · Carbohydrate:      ¨ ½ of a 3-inch bagel, 1 slice of bread, or ½ of a hamburger bun or hot dog bun (80)    ¨ 1 (8-inch) flour tortilla or ½ cup of cooked rice (100)    ¨ 1 (6-inch) corn tortilla (80)    ¨ 1 (6-inch) pancake or 1 cup of bran flakes cereal (110)    ¨ ½ cup of cooked cereal (80)    ¨ ½ cup of cooked pasta (85)    ¨ 1 ounce of pretzels (100)    ¨ 3 cups of air-popped popcorn without butter or oil (80)    · Dairy:      ¨ 1 cup of skim or 1% milk (90)    ¨ 1 cup of 2% milk (120)    ¨ 1 cup of whole milk (160)    ¨ 1 cup of 2% chocolate milk (220)    ¨ 1 ounce of low-fat cheese with 3 grams of fat per ounce (70)    ¨ 1 ounce of cheddar cheese (114)    ¨ ½ cup of 1% fat cottage cheese (80)    ¨ 1 cup of plain or sugar-free, fat-free yogurt (90)    · Protein foods:      ¨ 3 ounces of fish (not breaded or fried) (95)    ¨ 3 ounces of breaded, fried fish (195)    ¨ ¾ cup of tuna canned in water (105)    ¨ 3 ounces of chicken breast without skin (105)    ¨ 1 fried chicken breast with skin (350)    ¨ ¼ cup of fat free egg substitute (40)    ¨ 1 large egg (75)    ¨ 3 ounces of lean beef or pork (165)    ¨ 3 ounces of fried pork chop or ham (185)    ¨ ½ cup of cooked dried beans, such as kidney, calderón, lentils, or navy (115)    ¨ 3 ounces of bologna or lunch meat (225)    ¨ 2 links of breakfast sausage (140)    · Vegetables:      ¨ ½ cup of sliced mushrooms (10)    ¨ 1 cup of salad greens, such as lettuce, spinach, or aniyah (15)    ¨ ½ cup of steamed asparagus (20)    ¨ ½ cup of cooked summer squash, zucchini squash, or green or wax beans (25)    ¨ 1 cup of broccoli or cauliflower florets, or 1 medium tomato (25)    ¨ 1 large raw carrot or ½ cup of cooked carrots (40)    ¨ ? of a medium cucumber or 1 stalk of celery (5)    ¨ 1 small baked potato (160)    ¨ 1 cup of breaded, fried vegetables (230)    · Fruit:      ¨ 1 (6-inch) banana (55)     ¨ ½ of a 4-inch grapefruit (55)    ¨ 15 grapes (60)    ¨ 1 medium orange or apple (70)    ¨ 1 large peach (65)    ¨ 1 cup of fresh pineapple chunks (75)    ¨ 1 cup of melon cubes (50)    ¨ 1¼ cups of whole strawberries (45)    ¨ ½ cup of fruit canned in juice (55)    ¨ ½ cup of fruit canned in heavy syrup (110)    ¨ ?  cup of raisins (130)    ¨ ½ cup of unsweetened fruit juice (60)    ¨ ½ cup of grape, cranberry, or prune juice (90)    · Fat:      ¨ 10 peanuts or 2 teaspoons of peanut butter (55)    ¨ 2 tablespoons of avocado or 1 tablespoon of regular salad dressing (45)    ¨ 2 slices of mclaughlin (90)    ¨ 1 teaspoon of oil, such as safflower, canola, corn, or olive oil (45)    ¨ 2 teaspoons of low-fat margarine, or 1 tablespoon of low-fat mayonnaise (50)    ¨ 1 teaspoon of regular margarine (40)    ¨ 1 tablespoon of regular mayonnaise (135)    ¨ 1 tablespoon of cream cheese or 2 tablespoons of low-fat cream cheese (45)    ¨ 2 tablespoons of vegetable shortening (215)    · Dessert and sweets:      ¨ 8 animal crackers or 5 vanilla wafers (80)    ¨ 1 frozen fruit juice bar (80)    ¨ ½ cup of ice milk or low-fat frozen yogurt (90)    ¨ ½ cup of sherbet or sorbet (125)    ¨ ½ cup of sugar-free pudding or custard (60)    ¨ ½ cup of ice cream (140)    ¨ ½ cup of pudding or custard (175)    ¨ 1 (2-inch) square chocolate brownie (185)    · Combination foods:      ¨ Bean burrito made with an 8-inch tortilla, without cheese (275)    ¨ Chicken breast sandwich with lettuce and tomato (325)    ¨ 1 cup of chicken noodle soup (60)    ¨ 1 beef taco (175)    ¨ Regular hamburger with lettuce and tomato (310)    ¨ Regular cheeseburger with lettuce and tomato (410)     ¨ ¼ of a 12-inch cheese pizza (280)    ¨ Fried fish sandwich with lettuce and tomato (425)    ¨ Hot dog and bun (275)    ¨ 1½ cups of macaroni and cheese (310)    ¨ Taco salad with a fried tortilla shell (870)    · Low-calorie foods:      ¨ 1 tablespoon of ketchup or 1 tablespoon of fat free sour cream (15)    ¨ 1 teaspoon of mustard (5)    ¨ ¼ cup of salsa (20)    ¨ 1 large dill pickle (15)    ¨ 1 tablespoon of fat free salad dressing (10)    ¨ 2 teaspoons of low-sugar, light jam or jelly, or 1 tablespoon of sugar-free syrup (15)    ¨ 1 sugar-free popsicle (15)    ¨ 1 cup of club soda, seltzer water, or diet soda (0)  CARE AGREEMENT:   You have the right to help plan your care  Discuss treatment options with your caregivers to decide what care you want to receive  You always have the right to refuse treatment  The above information is an  only  It is not intended as medical advice for individual conditions or treatments  Talk to your doctor, nurse or pharmacist before following any medical regimen to see if it is safe and effective for you  © 2017 [employer]  Information is for End User's use only and may not be sold, redistributed or otherwise used for commercial purposes  All illustrations and images included in CareNotes® are the copyrighted property of [employer]  or Philipp Milan  Wellness Visit for Adults   AMBULATORY CARE:   A wellness visit  is when you see your healthcare provider to get screened for health problems  You can also get advice on how to stay healthy  Write down your questions so you remember to ask them  Ask your healthcare provider how often you should have a wellness visit     What happens at a wellness visit:  Your healthcare provider will ask about your health, and your family history of health problems  This includes high blood pressure, heart disease, and cancer  He or she will ask if you have symptoms that concern you, if you smoke, and about your mood  You may also be asked about your intake of medicines, supplements, food, and alcohol  Any of the following may be done:  · Your weight  will be checked  Your height may also be checked so your body mass index (BMI) can be calculated  Your BMI shows if you are at a healthy weight  · Your blood pressure  and heart rate will be checked  Your temperature may also be checked  · Blood and urine tests  may be done  Blood tests may be done to check your cholesterol levels  Abnormal cholesterol levels increase your risk for heart disease and stroke  You may also need a blood or urine test to check for diabetes if you are at increased risk  Urine tests may be done to look for signs of an infection or kidney disease  · A physical exam  includes checking your heartbeat and lungs with a stethoscope  Your healthcare provider may also check your skin to look for sun damage  · Screening tests  may be recommended  A screening test is done to check for diseases that may not cause symptoms  The screening tests you may need depend on your age, gender, family history, and lifestyle habits  For example, colorectal screening may be recommended if you are 48years old or older  Screening tests you need if you are a woman:   · A Pap smear  is used to screen for cervical cancer  Pap smears are usually done every 3 to 5 years depending on your age  You may need them more often if you have had abnormal Pap smear test results in the past  Ask your healthcare provider how often you should have a Pap smear  · A mammogram  is an x-ray of your breasts to screen for breast cancer  Experts recommend mammograms every 2 years starting at age 48 years   You may need a mammogram at age 52 years or younger if you have an increased risk for breast cancer  Talk to your healthcare provider about when you should start having mammograms and how often you need them  Vaccines you may need:   · Get an influenza vaccine  every year  The influenza vaccine protects you from the flu  Several types of viruses cause the flu  The viruses change over time, so new vaccines are made each year  · Get a tetanus-diphtheria (Td) booster vaccine  every 10 years  This vaccine protects you against tetanus and diphtheria  Tetanus is a severe infection that may cause painful muscle spasms and lockjaw  Diphtheria is a severe bacterial infection that causes a thick covering in the back of your mouth and throat  · Get a human papillomavirus (HPV) vaccine  if you are female and aged 23 to 32 or male 23 to 24 and never received it  This vaccine protects you from HPV infection  HPV is the most common infection spread by sexual contact  HPV may also cause vaginal, penile, and anal cancers  · Get a pneumococcal vaccine  if you are aged 72 years or older  The pneumococcal vaccine is an injection given to protect you from pneumococcal disease  Pneumococcal disease is an infection caused by pneumococcal bacteria  The infection may cause pneumonia, meningitis, or an ear infection  · Get a shingles vaccine  if you are aged 61 or older, even if you have had shingles before  The shingles vaccine is an injection to protect you from the varicella-zoster virus  This is the same virus that causes chickenpox  Shingles is a painful rash that develops in people who had chickenpox or have been exposed to the virus  How to eat healthy:  My Plate is a model for planning healthy meals  It shows the types and amounts of foods that should go on your plate  Fruits and vegetables make up about half of your plate, and grains and protein make up the other half  A serving of dairy is included on the side of your plate   The amount of calories and serving sizes you need depends on your age, gender, weight, and height  Examples of healthy foods are listed below:  · Eat a variety of vegetables  such as dark green, red, and orange vegetables  You can also include canned vegetables low in sodium (salt) and frozen vegetables without added butter or sauces  · Eat a variety of fresh fruits , canned fruit in 100% juice, frozen fruit, and dried fruit  · Include whole grains  At least half of the grains you eat should be whole grains  Examples include whole-wheat bread, wheat pasta, brown rice, and whole-grain cereals such as oatmeal     · Eat a variety of protein foods such as seafood (fish and shellfish), lean meat, and poultry without skin (turkey and chicken)  Examples of lean meats include pork leg, shoulder, or tenderloin, and beef round, sirloin, tenderloin, and extra lean ground beef  Other protein foods include eggs and egg substitutes, beans, peas, soy products, nuts, and seeds  · Choose low-fat dairy products such as skim or 1% milk or low-fat yogurt, cheese, and cottage cheese  · Limit unhealthy fats  such as butter, hard margarine, and shortening  Exercise:  Exercise at least 30 minutes per day on most days of the week  Some examples of exercise include walking, biking, dancing, and swimming  You can also fit in more physical activity by taking the stairs instead of the elevator or parking farther away from stores  Include muscle strengthening activities 2 days each week  Regular exercise provides many health benefits  It helps you manage your weight, and decreases your risk for type 2 diabetes, heart disease, stroke, and high blood pressure  Exercise can also help improve your mood  Ask your healthcare provider about the best exercise plan for you  General health and safety guidelines:   · Do not smoke  Nicotine and other chemicals in cigarettes and cigars can cause lung damage   Ask your healthcare provider for information if you currently smoke and need help to quit  E-cigarettes or smokeless tobacco still contain nicotine  Talk to your healthcare provider before you use these products  · Limit alcohol  A drink of alcohol is 12 ounces of beer, 5 ounces of wine, or 1½ ounces of liquor  · Lose weight, if needed  Being overweight increases your risk of certain health conditions  These include heart disease, high blood pressure, type 2 diabetes, and certain types of cancer  · Protect your skin  Do not sunbathe or use tanning beds  Use sunscreen with a SPF 15 or higher  Apply sunscreen at least 15 minutes before you go outside  Reapply sunscreen every 2 hours  Wear protective clothing, hats, and sunglasses when you are outside  · Drive safely  Always wear your seatbelt  Make sure everyone in your car wears a seatbelt  A seatbelt can save your life if you are in an accident  Do not use your cell phone when you are driving  This could distract you and cause an accident  Pull over if you need to make a call or send a text message  · Practice safe sex  Use latex condoms if are sexually active and have more than one partner  Your healthcare provider may recommend screening tests for sexually transmitted infections (STIs)  · Wear helmets, lifejackets, and protective gear  Always wear a helmet when you ride a bike or motorcycle, go skiing, or play sports that could cause a head injury  Wear protective equipment when you play sports  Wear a lifejacket when you are on a boat or doing water sports  © 2017 [address] [facility] Information is for End User's use only and may not be sold, redistributed or otherwise used for commercial purposes  All illustrations and images included in CareNotes® are the copyrighted property of [employer]  or Philipp Milan  The above information is an  only  It is not intended as medical advice for individual conditions or treatments   Talk to your doctor, nurse or pharmacist before following any medical regimen to see if it is safe and effective for you

## 2019-03-26 DIAGNOSIS — I10 ESSENTIAL HYPERTENSION: ICD-10-CM

## 2019-03-26 RX ORDER — LOSARTAN POTASSIUM 50 MG/1
TABLET ORAL
Qty: 90 TABLET | Refills: 0 | Status: SHIPPED | OUTPATIENT
Start: 2019-03-26 | End: 2019-06-29 | Stop reason: SDUPTHER

## 2019-06-29 DIAGNOSIS — I10 ESSENTIAL HYPERTENSION: ICD-10-CM

## 2019-07-01 RX ORDER — LOSARTAN POTASSIUM 50 MG/1
TABLET ORAL
Qty: 90 TABLET | Refills: 0 | Status: SHIPPED | OUTPATIENT
Start: 2019-07-01 | End: 2019-09-28 | Stop reason: SDUPTHER

## 2019-07-31 DIAGNOSIS — K51.80 OTHER ULCERATIVE COLITIS WITHOUT COMPLICATION (HCC): ICD-10-CM

## 2019-08-01 ENCOUNTER — TELEPHONE (OUTPATIENT)
Dept: GASTROENTEROLOGY | Facility: AMBULARY SURGERY CENTER | Age: 54
End: 2019-08-01

## 2019-08-01 RX ORDER — MESALAMINE 800 MG/1
TABLET, DELAYED RELEASE ORAL
Qty: 180 TABLET | Refills: 5 | Status: SHIPPED | OUTPATIENT
Start: 2019-08-01 | End: 2020-07-08 | Stop reason: SDUPTHER

## 2019-08-01 NOTE — TELEPHONE ENCOUNTER
yasmin patient      Refill Request for Medication: mesalamine    Dose: 800 mg    Quantity: 180 tab    [facility]: [facility]

## 2019-08-02 ENCOUNTER — TELEPHONE (OUTPATIENT)
Dept: FAMILY MEDICINE CLINIC | Facility: CLINIC | Age: 54
End: 2019-08-02

## 2019-08-02 NOTE — TELEPHONE ENCOUNTER
Napoleon Castellanos has a sore throat which went away but now he has a cough  He is going on vacation & is asking if you would give him an antibiotic? I explained that he would normally have to be seen & suggested Urgent Care but he asked me to ask you anyway  Thank you!

## 2019-09-03 ENCOUNTER — APPOINTMENT (OUTPATIENT)
Dept: LAB | Facility: AMBULARY SURGERY CENTER | Age: 54
End: 2019-09-03
Payer: COMMERCIAL

## 2019-09-03 ENCOUNTER — OFFICE VISIT (OUTPATIENT)
Dept: GASTROENTEROLOGY | Facility: AMBULARY SURGERY CENTER | Age: 54
End: 2019-09-03
Payer: COMMERCIAL

## 2019-09-03 VITALS
HEART RATE: 76 BPM | DIASTOLIC BLOOD PRESSURE: 80 MMHG | RESPIRATION RATE: 16 BRPM | BODY MASS INDEX: 34.09 KG/M2 | WEIGHT: 217.2 LBS | TEMPERATURE: 98.9 F | SYSTOLIC BLOOD PRESSURE: 120 MMHG | HEIGHT: 67 IN

## 2019-09-03 DIAGNOSIS — R10.32 LEFT LOWER QUADRANT PAIN: ICD-10-CM

## 2019-09-03 DIAGNOSIS — K62.5 RECTAL BLEEDING: ICD-10-CM

## 2019-09-03 DIAGNOSIS — K51.90 ULCERATIVE COLITIS WITHOUT COMPLICATIONS, UNSPECIFIED LOCATION (HCC): ICD-10-CM

## 2019-09-03 DIAGNOSIS — K51.90 ULCERATIVE COLITIS WITHOUT COMPLICATIONS, UNSPECIFIED LOCATION (HCC): Primary | ICD-10-CM

## 2019-09-03 DIAGNOSIS — I10 ESSENTIAL HYPERTENSION: ICD-10-CM

## 2019-09-03 DIAGNOSIS — K51.80 OTHER ULCERATIVE COLITIS WITHOUT COMPLICATION (HCC): Primary | ICD-10-CM

## 2019-09-03 DIAGNOSIS — Z12.5 PROSTATE CANCER SCREENING: ICD-10-CM

## 2019-09-03 LAB
ALBUMIN SERPL BCP-MCNC: 3.5 G/DL (ref 3.5–5)
ALP SERPL-CCNC: 86 U/L (ref 46–116)
ALT SERPL W P-5'-P-CCNC: 32 U/L (ref 12–78)
ANION GAP SERPL CALCULATED.3IONS-SCNC: 4 MMOL/L (ref 4–13)
AST SERPL W P-5'-P-CCNC: 22 U/L (ref 5–45)
BASOPHILS # BLD AUTO: 0.08 THOUSANDS/ΜL (ref 0–0.1)
BASOPHILS NFR BLD AUTO: 1 % (ref 0–1)
BILIRUB SERPL-MCNC: 0.37 MG/DL (ref 0.2–1)
BUN SERPL-MCNC: 16 MG/DL (ref 5–25)
CALCIUM SERPL-MCNC: 8.8 MG/DL (ref 8.3–10.1)
CHLORIDE SERPL-SCNC: 110 MMOL/L (ref 100–108)
CHOLEST SERPL-MCNC: 165 MG/DL (ref 50–200)
CO2 SERPL-SCNC: 27 MMOL/L (ref 21–32)
CREAT SERPL-MCNC: 1.05 MG/DL (ref 0.6–1.3)
CRP SERPL QL: 5 MG/L
EOSINOPHIL # BLD AUTO: 0.31 THOUSAND/ΜL (ref 0–0.61)
EOSINOPHIL NFR BLD AUTO: 5 % (ref 0–6)
ERYTHROCYTE [DISTWIDTH] IN BLOOD BY AUTOMATED COUNT: 13.2 % (ref 11.6–15.1)
GFR SERPL CREATININE-BSD FRML MDRD: 80 ML/MIN/1.73SQ M
GLUCOSE P FAST SERPL-MCNC: 92 MG/DL (ref 65–99)
HBV CORE AB SER QL: NORMAL
HBV CORE IGM SER QL: NORMAL
HBV SURFACE AG SER QL: NORMAL
HCT VFR BLD AUTO: 43.5 % (ref 36.5–49.3)
HCV AB SER QL: NORMAL
HDLC SERPL-MCNC: 49 MG/DL (ref 40–60)
HGB BLD-MCNC: 14.3 G/DL (ref 12–17)
IMM GRANULOCYTES # BLD AUTO: 0.02 THOUSAND/UL (ref 0–0.2)
IMM GRANULOCYTES NFR BLD AUTO: 0 % (ref 0–2)
LDLC SERPL CALC-MCNC: 99 MG/DL (ref 0–100)
LYMPHOCYTES # BLD AUTO: 2.11 THOUSANDS/ΜL (ref 0.6–4.47)
LYMPHOCYTES NFR BLD AUTO: 33 % (ref 14–44)
MCH RBC QN AUTO: 30.2 PG (ref 26.8–34.3)
MCHC RBC AUTO-ENTMCNC: 32.9 G/DL (ref 31.4–37.4)
MCV RBC AUTO: 92 FL (ref 82–98)
MONOCYTES # BLD AUTO: 0.82 THOUSAND/ΜL (ref 0.17–1.22)
MONOCYTES NFR BLD AUTO: 13 % (ref 4–12)
NEUTROPHILS # BLD AUTO: 3.1 THOUSANDS/ΜL (ref 1.85–7.62)
NEUTS SEG NFR BLD AUTO: 48 % (ref 43–75)
NRBC BLD AUTO-RTO: 0 /100 WBCS
PLATELET # BLD AUTO: 302 THOUSANDS/UL (ref 149–390)
PMV BLD AUTO: 9.6 FL (ref 8.9–12.7)
POTASSIUM SERPL-SCNC: 4.3 MMOL/L (ref 3.5–5.3)
PROT SERPL-MCNC: 7.1 G/DL (ref 6.4–8.2)
RBC # BLD AUTO: 4.73 MILLION/UL (ref 3.88–5.62)
SODIUM SERPL-SCNC: 141 MMOL/L (ref 136–145)
TRIGL SERPL-MCNC: 85 MG/DL
WBC # BLD AUTO: 6.44 THOUSAND/UL (ref 4.31–10.16)

## 2019-09-03 PROCEDURE — 36415 COLL VENOUS BLD VENIPUNCTURE: CPT

## 2019-09-03 PROCEDURE — 86480 TB TEST CELL IMMUN MEASURE: CPT

## 2019-09-03 PROCEDURE — 87340 HEPATITIS B SURFACE AG IA: CPT

## 2019-09-03 PROCEDURE — 80061 LIPID PANEL: CPT

## 2019-09-03 PROCEDURE — 80053 COMPREHEN METABOLIC PANEL: CPT | Performed by: FAMILY MEDICINE

## 2019-09-03 PROCEDURE — 99213 OFFICE O/P EST LOW 20 MIN: CPT | Performed by: PHYSICIAN ASSISTANT

## 2019-09-03 PROCEDURE — 86140 C-REACTIVE PROTEIN: CPT

## 2019-09-03 PROCEDURE — 84154 ASSAY OF PSA FREE: CPT

## 2019-09-03 PROCEDURE — 86704 HEP B CORE ANTIBODY TOTAL: CPT

## 2019-09-03 PROCEDURE — 86705 HEP B CORE ANTIBODY IGM: CPT

## 2019-09-03 PROCEDURE — 86803 HEPATITIS C AB TEST: CPT

## 2019-09-03 PROCEDURE — 82542 COL CHROMOTOGRAPHY QUAL/QUAN: CPT

## 2019-09-03 PROCEDURE — 84153 ASSAY OF PSA TOTAL: CPT

## 2019-09-03 PROCEDURE — 85025 COMPLETE CBC W/AUTO DIFF WBC: CPT

## 2019-09-03 NOTE — PROGRESS NOTES
[facility] - Outpatient Follow-up Note  Ray Hernandez 53 y o  male MRN: [id number]  Encounter: [id number]  ASSESSMENT AND PLAN:      Left sided ulcerative colitis  -symptomatically he is doing very well on mesalamine  -we will check inflammatory markers   -If he has persistent elevation of inflammatory markers would recommend follow up colonoscopy sooner, otherwise we will plan for this in January 2020  -also check quantiferon gold, hep B serologies, tpmt enzyme in the event escalation of therapy is necessary in the future  -continue asacol  -recommended high fiber diet  ______________________________________________________________________    SUBJECTIVE:  Dary Kumar is a 52 yo male who presents for follow up of ulcerative colitis  He is currently doing very well on asacol 4600mg daily  He denies any abdominal pain, blood in stools, diarrhea  He actually sometimes feels constipated  He was diagnosed in 12/2018  He reports eating poorly and gaining weight  REVIEW OF SYSTEMS IS OTHERWISE NEGATIVE  Historical Information   Past Medical History:   Diagnosis Date    Hypertension      Past Surgical History:   Procedure Laterality Date    CARDIAC CATHETERIZATION      AR COLONOSCOPY FLX DX W/COLLJ SPEC WHEN PFRMD N/A 4/25/2016    Procedure: COLONOSCOPY;  Surgeon: Shira Bueno MD;  [facility]; Service: Gastroenterology    AR SIGMOIDOSCOPY FLX DX W/COLLJ SPEC BR/WA IF PFRMD N/A 12/4/2018    Procedure: Nicola Xiong;  Surgeon: Michael Salazar MD;  Location[facility];   Service: Gastroenterology     Social History   Social History     Substance and Sexual Activity   Alcohol Use Yes    Comment: social     Social History     Substance and Sexual Activity   Drug Use No     Social History     Tobacco Use   Smoking Status Former Smoker   Smokeless Tobacco Current User     Family History   Problem Relation Age of Onset    Diabetes Mother     Heart disease Father Cardiac Disorder    Heart disease Brother         Cardiac Disorder       Meds/Allergies       Current Outpatient Medications:     losartan (COZAAR) 50 mg tablet    mesalamine (ASACOL) 800 MG EC tablet    aspirin 81 MG tablet    MULTIPLE VITAMIN PO    No Known Allergies    Objective     Blood pressure 120/80, pulse 76, temperature 98 9 °F (37 2 °C), temperature source Tympanic, resp  rate 16, height 5' 7" (1 702 m), weight 98 5 kg (217 lb 3 2 oz)  Body mass index is 34 02 kg/m²  PHYSICAL EXAM:      General Appearance:   Alert, cooperative, no distress   HEENT:   Normocephalic, atraumatic, anicteric      Neck:  Supple, symmetrical, trachea midline   Lungs:   Clear to auscultation bilaterally   Heart[de-identified]   Regular rate and rhythm; no murmur, rub, or gallop  Abdomen:   Soft, non-tender, non-distended; normal bowel sounds; no masses, no organomegaly                            Lab Results:   No visits with results within 1 Day(s) from this visit  Latest known visit with results is:   Appointment on 01/25/2019   Component Date Value    Sed Rate 01/25/2019 16*    CRP 01/25/2019 8 4*     Radiology Results:   No results found

## 2019-09-04 ENCOUNTER — TELEPHONE (OUTPATIENT)
Dept: GASTROENTEROLOGY | Facility: AMBULARY SURGERY CENTER | Age: 54
End: 2019-09-04

## 2019-09-04 LAB
GAMMA INTERFERON BACKGROUND BLD IA-ACNC: 0.03 IU/ML
M TB IFN-G BLD-IMP: NEGATIVE
M TB IFN-G CD4+ BCKGRND COR BLD-ACNC: -0.01 IU/ML
M TB IFN-G CD4+ BCKGRND COR BLD-ACNC: 0 IU/ML
MITOGEN IGNF BCKGRD COR BLD-ACNC: 9.14 IU/ML
PSA FREE MFR SERPL: 15 %
PSA FREE SERPL-MCNC: 0.12 NG/ML
PSA SERPL-MCNC: 0.8 NG/ML (ref 0–4)

## 2019-09-04 NOTE — TELEPHONE ENCOUNTER
----- Message from Maninder Carrasquillo PA-C sent at 9/4/2019  1:27 PM EDT -----  Please let patient know his labs overall look good  His CRP is very mildly elevated at 5 but this has improved from 7 months ago   We will plan for colonoscopy in January

## 2019-09-09 ENCOUNTER — TELEPHONE (OUTPATIENT)
Dept: GASTROENTEROLOGY | Facility: AMBULARY SURGERY CENTER | Age: 54
End: 2019-09-09

## 2019-09-09 LAB
REF LAB TEST METHOD: NORMAL
TEST INTERPRETATION: NORMAL
TPMT RBC-CCNC: 14.9 UNITS/ML RBC

## 2019-09-09 NOTE — TELEPHONE ENCOUNTER
----- Message from Aime Foy PA-C sent at 9/9/2019  2:46 PM EDT -----  Please inform patient that his TPMT level is on the lower end which would mean remicard may not be a good choice for patient in the future  At this time, we are planning for colonoscopy in January and no need for escalation in his therapy  If he becomes symptomatic in between he should call us

## 2019-09-28 DIAGNOSIS — I10 ESSENTIAL HYPERTENSION: ICD-10-CM

## 2019-09-30 RX ORDER — LOSARTAN POTASSIUM 50 MG/1
TABLET ORAL
Qty: 90 TABLET | Refills: 0 | Status: SHIPPED | OUTPATIENT
Start: 2019-09-30 | End: 2020-03-06

## 2020-03-05 DIAGNOSIS — I10 ESSENTIAL HYPERTENSION: ICD-10-CM

## 2020-03-06 RX ORDER — LOSARTAN POTASSIUM 50 MG/1
TABLET ORAL
Qty: 90 TABLET | Refills: 0 | Status: SHIPPED | OUTPATIENT
Start: 2020-03-06 | End: 2020-06-02

## 2020-03-06 NOTE — TELEPHONE ENCOUNTER
Requested medication(s) are due for refill today: Yes  Patient has already received a courtesy refill: No  Other reason request has been forwarded to provider:    Per protocol

## 2020-04-20 ENCOUNTER — TELEPHONE (OUTPATIENT)
Dept: GASTROENTEROLOGY | Facility: AMBULARY SURGERY CENTER | Age: 55
End: 2020-04-20

## 2020-04-20 ENCOUNTER — TELEMEDICINE (OUTPATIENT)
Dept: GASTROENTEROLOGY | Facility: AMBULARY SURGERY CENTER | Age: 55
End: 2020-04-20
Payer: COMMERCIAL

## 2020-04-20 DIAGNOSIS — K62.5 RECTAL BLEEDING: ICD-10-CM

## 2020-04-20 DIAGNOSIS — K51.30 ULCERATIVE RECTOSIGMOIDITIS WITHOUT COMPLICATION (HCC): Primary | ICD-10-CM

## 2020-04-20 PROCEDURE — 99214 OFFICE O/P EST MOD 30 MIN: CPT | Performed by: INTERNAL MEDICINE

## 2020-04-25 ENCOUNTER — TRANSCRIBE ORDERS (OUTPATIENT)
Dept: LAB | Facility: CLINIC | Age: 55
End: 2020-04-25

## 2020-04-25 ENCOUNTER — APPOINTMENT (OUTPATIENT)
Dept: LAB | Facility: CLINIC | Age: 55
End: 2020-04-25
Payer: COMMERCIAL

## 2020-04-25 DIAGNOSIS — K62.5 RECTAL BLEEDING: ICD-10-CM

## 2020-04-25 LAB
ALBUMIN SERPL BCP-MCNC: 3.5 G/DL (ref 3.5–5)
ALP SERPL-CCNC: 83 U/L (ref 46–116)
ALT SERPL W P-5'-P-CCNC: 45 U/L (ref 12–78)
ANION GAP SERPL CALCULATED.3IONS-SCNC: 6 MMOL/L (ref 4–13)
AST SERPL W P-5'-P-CCNC: 22 U/L (ref 5–45)
BASOPHILS # BLD AUTO: 0.06 THOUSANDS/ΜL (ref 0–0.1)
BASOPHILS NFR BLD AUTO: 1 % (ref 0–1)
BILIRUB SERPL-MCNC: 0.29 MG/DL (ref 0.2–1)
BUN SERPL-MCNC: 13 MG/DL (ref 5–25)
CALCIUM SERPL-MCNC: 8.8 MG/DL (ref 8.3–10.1)
CHLORIDE SERPL-SCNC: 106 MMOL/L (ref 100–108)
CO2 SERPL-SCNC: 30 MMOL/L (ref 21–32)
CREAT SERPL-MCNC: 1.2 MG/DL (ref 0.6–1.3)
CRP SERPL QL: 6.7 MG/L
EOSINOPHIL # BLD AUTO: 0.45 THOUSAND/ΜL (ref 0–0.61)
EOSINOPHIL NFR BLD AUTO: 6 % (ref 0–6)
ERYTHROCYTE [DISTWIDTH] IN BLOOD BY AUTOMATED COUNT: 12.5 % (ref 11.6–15.1)
ERYTHROCYTE [SEDIMENTATION RATE] IN BLOOD: 2 MM/HOUR (ref 0–10)
GFR SERPL CREATININE-BSD FRML MDRD: 67 ML/MIN/1.73SQ M
GLUCOSE SERPL-MCNC: 91 MG/DL (ref 65–140)
HCT VFR BLD AUTO: 44.9 % (ref 36.5–49.3)
HGB BLD-MCNC: 14.7 G/DL (ref 12–17)
IMM GRANULOCYTES # BLD AUTO: 0.02 THOUSAND/UL (ref 0–0.2)
IMM GRANULOCYTES NFR BLD AUTO: 0 % (ref 0–2)
LYMPHOCYTES # BLD AUTO: 2.11 THOUSANDS/ΜL (ref 0.6–4.47)
LYMPHOCYTES NFR BLD AUTO: 28 % (ref 14–44)
MCH RBC QN AUTO: 30.6 PG (ref 26.8–34.3)
MCHC RBC AUTO-ENTMCNC: 32.7 G/DL (ref 31.4–37.4)
MCV RBC AUTO: 93 FL (ref 82–98)
MONOCYTES # BLD AUTO: 0.83 THOUSAND/ΜL (ref 0.17–1.22)
MONOCYTES NFR BLD AUTO: 11 % (ref 4–12)
NEUTROPHILS # BLD AUTO: 4.09 THOUSANDS/ΜL (ref 1.85–7.62)
NEUTS SEG NFR BLD AUTO: 54 % (ref 43–75)
NRBC BLD AUTO-RTO: 0 /100 WBCS
PLATELET # BLD AUTO: 323 THOUSANDS/UL (ref 149–390)
PMV BLD AUTO: 8.9 FL (ref 8.9–12.7)
POTASSIUM SERPL-SCNC: 4 MMOL/L (ref 3.5–5.3)
PROT SERPL-MCNC: 7.1 G/DL (ref 6.4–8.2)
RBC # BLD AUTO: 4.81 MILLION/UL (ref 3.88–5.62)
SODIUM SERPL-SCNC: 142 MMOL/L (ref 136–145)
WBC # BLD AUTO: 7.56 THOUSAND/UL (ref 4.31–10.16)

## 2020-04-25 PROCEDURE — 86140 C-REACTIVE PROTEIN: CPT

## 2020-04-25 PROCEDURE — 80053 COMPREHEN METABOLIC PANEL: CPT

## 2020-04-25 PROCEDURE — 36415 COLL VENOUS BLD VENIPUNCTURE: CPT

## 2020-04-25 PROCEDURE — 85652 RBC SED RATE AUTOMATED: CPT

## 2020-04-25 PROCEDURE — 85025 COMPLETE CBC W/AUTO DIFF WBC: CPT

## 2020-04-28 ENCOUNTER — TELEPHONE (OUTPATIENT)
Dept: GASTROENTEROLOGY | Facility: AMBULARY SURGERY CENTER | Age: 55
End: 2020-04-28

## 2020-04-30 ENCOUNTER — TELEPHONE (OUTPATIENT)
Dept: GASTROENTEROLOGY | Facility: CLINIC | Age: 55
End: 2020-04-30

## 2020-05-06 ENCOUNTER — ANESTHESIA EVENT (OUTPATIENT)
Dept: GASTROENTEROLOGY | Facility: HOSPITAL | Age: 55
End: 2020-05-06

## 2020-05-06 ENCOUNTER — HOSPITAL ENCOUNTER (OUTPATIENT)
Dept: GASTROENTEROLOGY | Facility: HOSPITAL | Age: 55
Setting detail: OUTPATIENT SURGERY
Discharge: HOME | End: 2020-05-06
Attending: INTERNAL MEDICINE | Admitting: INTERNAL MEDICINE
Payer: COMMERCIAL

## 2020-05-06 ENCOUNTER — ANESTHESIA (OUTPATIENT)
Dept: GASTROENTEROLOGY | Facility: HOSPITAL | Age: 55
End: 2020-05-06

## 2020-05-06 VITALS
TEMPERATURE: 96.4 F | HEART RATE: 52 BPM | HEIGHT: 67 IN | DIASTOLIC BLOOD PRESSURE: 86 MMHG | SYSTOLIC BLOOD PRESSURE: 140 MMHG | RESPIRATION RATE: 16 BRPM | OXYGEN SATURATION: 99 % | BODY MASS INDEX: 32.18 KG/M2 | WEIGHT: 205 LBS

## 2020-05-06 DIAGNOSIS — K62.5 RECTAL BLEEDING: ICD-10-CM

## 2020-05-06 DIAGNOSIS — K51.30 ULCERATIVE RECTOSIGMOIDITIS WITHOUT COMPLICATION (HCC): ICD-10-CM

## 2020-05-06 PROCEDURE — 88305 TISSUE EXAM BY PATHOLOGIST: CPT | Performed by: PATHOLOGY

## 2020-05-06 PROCEDURE — 45380 COLONOSCOPY AND BIOPSY: CPT | Performed by: INTERNAL MEDICINE

## 2020-05-06 RX ORDER — SODIUM CHLORIDE, SODIUM LACTATE, POTASSIUM CHLORIDE, CALCIUM CHLORIDE 600; 310; 30; 20 MG/100ML; MG/100ML; MG/100ML; MG/100ML
INJECTION, SOLUTION INTRAVENOUS CONTINUOUS PRN
Status: DISCONTINUED | OUTPATIENT
Start: 2020-05-06 | End: 2020-05-06 | Stop reason: SURG

## 2020-05-06 RX ORDER — LIDOCAINE HYDROCHLORIDE 10 MG/ML
INJECTION, SOLUTION EPIDURAL; INFILTRATION; INTRACAUDAL; PERINEURAL AS NEEDED
Status: DISCONTINUED | OUTPATIENT
Start: 2020-05-06 | End: 2020-05-06 | Stop reason: SURG

## 2020-05-06 RX ORDER — MESALAMINE 4 G/60ML
4 ENEMA RECTAL
Qty: 30 BOTTLE | Refills: 2 | Status: SHIPPED | OUTPATIENT
Start: 2020-05-06 | End: 2021-05-13 | Stop reason: ALTCHOICE

## 2020-05-06 RX ORDER — PROPOFOL 10 MG/ML
INJECTION, EMULSION INTRAVENOUS AS NEEDED
Status: DISCONTINUED | OUTPATIENT
Start: 2020-05-06 | End: 2020-05-06 | Stop reason: SURG

## 2020-05-06 RX ORDER — SODIUM CHLORIDE, SODIUM LACTATE, POTASSIUM CHLORIDE, CALCIUM CHLORIDE 600; 310; 30; 20 MG/100ML; MG/100ML; MG/100ML; MG/100ML
125 INJECTION, SOLUTION INTRAVENOUS CONTINUOUS
Status: CANCELLED | OUTPATIENT
Start: 2020-05-06

## 2020-05-06 RX ADMIN — PROPOFOL 50 MG: 10 INJECTION, EMULSION INTRAVENOUS at 10:58

## 2020-05-06 RX ADMIN — PROPOFOL 100 MG: 10 INJECTION, EMULSION INTRAVENOUS at 10:46

## 2020-05-06 RX ADMIN — LIDOCAINE HYDROCHLORIDE 50 MG: 10 INJECTION, SOLUTION EPIDURAL; INFILTRATION; INTRACAUDAL; PERINEURAL at 10:46

## 2020-05-06 RX ADMIN — PROPOFOL 50 MG: 10 INJECTION, EMULSION INTRAVENOUS at 10:50

## 2020-05-06 RX ADMIN — PROPOFOL 50 MG: 10 INJECTION, EMULSION INTRAVENOUS at 10:53

## 2020-05-06 RX ADMIN — SODIUM CHLORIDE, SODIUM LACTATE, POTASSIUM CHLORIDE, AND CALCIUM CHLORIDE: .6; .31; .03; .02 INJECTION, SOLUTION INTRAVENOUS at 10:39

## 2020-05-15 ENCOUNTER — TELEPHONE (OUTPATIENT)
Dept: GASTROENTEROLOGY | Facility: AMBULARY SURGERY CENTER | Age: 55
End: 2020-05-15

## 2020-05-30 DIAGNOSIS — I10 ESSENTIAL HYPERTENSION: ICD-10-CM

## 2020-06-02 RX ORDER — LOSARTAN POTASSIUM 50 MG/1
TABLET ORAL
Qty: 30 TABLET | Refills: 0 | Status: SHIPPED | OUTPATIENT
Start: 2020-06-02 | End: 2021-04-26 | Stop reason: SDUPTHER

## 2020-06-12 ENCOUNTER — TELEPHONE (OUTPATIENT)
Dept: GASTROENTEROLOGY | Facility: AMBULARY SURGERY CENTER | Age: 55
End: 2020-06-12

## 2020-07-02 ENCOUNTER — OFFICE VISIT (OUTPATIENT)
Dept: GASTROENTEROLOGY | Facility: AMBULARY SURGERY CENTER | Age: 55
End: 2020-07-02
Payer: COMMERCIAL

## 2020-07-02 ENCOUNTER — TELEPHONE (OUTPATIENT)
Dept: GASTROENTEROLOGY | Facility: AMBULARY SURGERY CENTER | Age: 55
End: 2020-07-02

## 2020-07-02 VITALS
SYSTOLIC BLOOD PRESSURE: 110 MMHG | TEMPERATURE: 97.9 F | WEIGHT: 213.8 LBS | DIASTOLIC BLOOD PRESSURE: 70 MMHG | HEIGHT: 67 IN | HEART RATE: 80 BPM | BODY MASS INDEX: 33.56 KG/M2 | RESPIRATION RATE: 18 BRPM

## 2020-07-02 DIAGNOSIS — D12.6 DYSPLASIA OF COLON: ICD-10-CM

## 2020-07-02 DIAGNOSIS — K51.919 ULCERATIVE COLITIS WITH COMPLICATION, UNSPECIFIED LOCATION (HCC): Primary | ICD-10-CM

## 2020-07-02 PROCEDURE — 3008F BODY MASS INDEX DOCD: CPT | Performed by: INTERNAL MEDICINE

## 2020-07-02 PROCEDURE — 3074F SYST BP LT 130 MM HG: CPT | Performed by: INTERNAL MEDICINE

## 2020-07-02 PROCEDURE — 99214 OFFICE O/P EST MOD 30 MIN: CPT | Performed by: INTERNAL MEDICINE

## 2020-07-02 PROCEDURE — 3078F DIAST BP <80 MM HG: CPT | Performed by: INTERNAL MEDICINE

## 2020-07-02 NOTE — PROGRESS NOTES
[facility]  Progress Note - Yoandy Adhikari 63 y o  male MRN: [id number]    Unit/Bed#:  Encounter: [id number]    Assessment/Plan:    1  Ulcerative colitis with rectal biopsies indefinite for dysplasia-appears to be pan colitis, quiescent disease in the right side of the colon and in the left side of the colon however indefinite for dysplasia in the rectum with active mild colitis  Patient is reluctant about taking Rowasa enema  I discussed with him that it is important to establish remission and to minimize disease activity  Additionally, he will need repeat colonoscopy in 3-6 months the to assess for dysplasia given mild disease activity with background of indefinite for dysplasia on rectal biopsies  Patient is in agreement with the plan and will start the enemas   -he has been taking Asacol 4 8 g on daily basis  Encouraged to continue doing this   -check sed rate, CRP and fecal calprotectin  Subjective: This is a 79-year-old male with history of ulcerative colitis diagnosed in 2018, who has been inconsistently taking Asacol in the past, presents for follow-up after recent colonoscopy  Patient was experiencing some abdominal pain and rectal bleeding prior to repeat colonoscopy  Colonoscopy was notable for mild distal colitis in the rectum, biopsies confirmed active mild colitis  He also had several subcentimeter pseudo polyps in the colon, biopsies from the cecum showed chronic, quiescent colitis without dysplasia, benign mucosa in the ascending colon, transverse colon, descending/sigmoid colon again with quiescent colitis, rectal biopsy was mild chronic active colitis, indefinite for dysplasia  Patient was subsequently started on Rowasa enema in addition to Asacol 4 8 g daily  Patient reports that since the last visit, he has been taking Asacol on a daily basis but has not tried the Rowasa enema  He reports that he does not feel comfortable taking this    He reports that he has been asymptomatic since taking Asacol regularly  Objective:     Vitals: Blood pressure 110/70, pulse 80, temperature 97 9 °F (36 6 °C), temperature source Tympanic, resp  rate 18, height 5' 7" (1 702 m), weight 97 kg (213 lb 12 8 oz)  ,Body mass index is 33 49 kg/m²  [unfilled]    Physical Exam:    GEN: wn/wd, NAD  HEENT: MMM, no cervical or supraclavicular LAD, anciteric  CV: RRR, no m/r/g  CHEST: CTA b/l, no w/r/r  ABD: +BS, soft, NT/ND, no hepatosplenomegaly  EXT: no c/c/e  SKIN: no rashes  NEURO: aaox3      Invasive Devices     Peripheral Intravenous Line            Long-Dwell Peripheral IV (Midline) 14/10/72 Left Basilic 337 days                        Lab, Imaging and other studies:     No visits with results within 1 Day(s) from this visit  Latest known visit with results is:   Hospital Outpatient Visit on 05/06/2020   Component Date Value    Case Report 05/06/2020                      Value:Surgical Pathology Report                         Case: [id number]                                   Authorizing Provider:  Jules Jin MD       Collected:           05/06/2020 1104              Ordering Location:     [facility]        Received:            05/06/2020 [address]  End[facility]                                                           Pathologist:           Kezia Ta MD                                                                 Specimens:   A) - Colon, cecal bx r/o dysplasia-hx U C  B) - Large Intestine, Right/Ascending Colon, ascending colon bx r/o dysplasia-hx U C  C) - Large Intestine, Transverse Colon, transverse colon bx r/o dysplasia-hx U C                     D) - Polyp, Colorectal, transverse colon polyp-psudo vs real polyp                                  E) - Large Intestine, Left/Descending Colon, descending colon bx r/o dysplasia-hx U C  F) - Large Intestine, Sigmoid Colon, sigmoid colon bx r/o dysplasia-hxU  C  G) - Large Intestine, Sigmoid Colon, recto-sigmoid colon bx r/o dysplasia-hx U C  H) - Colon, rectal bx r/o dysplasia-hx U C                                                 Final Diagnosis 05/06/2020                      Value: This result contains rich text formatting which cannot be displayed here   Additional Information 05/06/2020                      Value: This result contains rich text formatting which cannot be displayed here  Roderick De La Torre Gross Description 05/06/2020                      Value: This result contains rich text formatting which cannot be displayed here  I have personally reviewed pertinent films in PACS    No current facility-administered medications for this visit

## 2020-07-08 DIAGNOSIS — K51.80 OTHER ULCERATIVE COLITIS WITHOUT COMPLICATION (HCC): ICD-10-CM

## 2020-07-08 RX ORDER — MESALAMINE 800 MG/1
TABLET, DELAYED RELEASE ORAL
Qty: 180 TABLET | Refills: 5 | Status: SHIPPED | OUTPATIENT
Start: 2020-07-08 | End: 2021-01-12 | Stop reason: SDUPTHER

## 2020-07-10 DIAGNOSIS — I10 ESSENTIAL HYPERTENSION: ICD-10-CM

## 2020-07-10 RX ORDER — LOSARTAN POTASSIUM 50 MG/1
50 TABLET ORAL DAILY
Qty: 30 TABLET | Refills: 0 | Status: CANCELLED | OUTPATIENT
Start: 2020-07-10

## 2020-07-10 RX ORDER — MESALAMINE 800 MG/1
TABLET, DELAYED RELEASE ORAL
Qty: 180 TABLET | Refills: 5 | OUTPATIENT
Start: 2020-07-10

## 2021-01-12 DIAGNOSIS — K51.80 OTHER ULCERATIVE COLITIS WITHOUT COMPLICATION (HCC): ICD-10-CM

## 2021-01-12 RX ORDER — MESALAMINE 800 MG/1
TABLET, DELAYED RELEASE ORAL
Qty: 180 TABLET | Refills: 5 | Status: SHIPPED | OUTPATIENT
Start: 2021-01-12 | End: 2021-07-09 | Stop reason: SDUPTHER

## 2021-01-12 NOTE — TELEPHONE ENCOUNTER
GI Physician: Dr Zheng Bowden    Refill Request for Medication: mesalamine    Dose: 800 mg     Quantity: 180 tab w/ refills    Pharmacy and Location: [facility] off [facility] in [place]

## 2021-01-12 NOTE — TELEPHONE ENCOUNTER
Patients GI provider:  Dr Shruthi Baxter    Number to return call: [phone]    Reason for call: Pt calling requesting to speak with someone to discuss blood work order  would like to know if this need prior auth since he has updated insurance       Scheduled procedure/appointment date if applicable: Apt/procedure n/a

## 2021-01-13 ENCOUNTER — TELEPHONE (OUTPATIENT)
Dept: GASTROENTEROLOGY | Facility: AMBULARY SURGERY CENTER | Age: 56
End: 2021-01-13

## 2021-01-13 NOTE — TELEPHONE ENCOUNTER
Called and spoke with patient  He states that his insurance changed this month and he wanted to make sure that if he gets lab work done, it won't be a problem that the slips state his old insurance information  I let him know that this should not be an issue as he can simply present his new card when he gets blood work   He verbalized understanding and will call if there are any problems

## 2021-04-19 ENCOUNTER — IMMUNIZATIONS (OUTPATIENT)
Dept: FAMILY MEDICINE CLINIC | Facility: HOSPITAL | Age: 56
End: 2021-04-19

## 2021-04-19 DIAGNOSIS — Z23 ENCOUNTER FOR IMMUNIZATION: Primary | ICD-10-CM

## 2021-04-19 PROCEDURE — 91300 SARS-COV-2 / COVID-19 MRNA VACCINE (PFIZER-BIONTECH) 30 MCG: CPT

## 2021-04-19 PROCEDURE — 0001A SARS-COV-2 / COVID-19 MRNA VACCINE (PFIZER-BIONTECH) 30 MCG: CPT

## 2021-04-19 NOTE — PROGRESS NOTES
ADULT ANNUAL PHYSICAL  [facility]    NAME: Shaun Koehler  AGE: 53 y o  SEX: male  : 1965     DATE: 3/22/2019     Assessment and Plan:     Problem List Items Addressed This Visit        Cardiovascular and Mediastinum    Essential hypertension    Relevant Orders    Comprehensive metabolic panel    Lipid Panel with Direct LDL reflex      Other Visit Diagnoses     Annual physical exam    -  Primary    Tobacco dependence        Encounter for hepatitis C screening test for low risk patient        Relevant Orders    Hepatitis C antibody    Obstructive sleep apnea syndrome        Relevant Orders    Ambulatory referral to Sleep Medicine    Prostate cancer screening        Relevant Orders    PSA, total and free          Health maintenance and preventative care screenings were discussed with patient today  Appropriate education was printed on patient's after visit summary  · Discussed risks/benefits of screening for colorectal cancer, hepatitis c, high cholesterol and diabetes  Patient is up-to-date with their preventive screenings  · Immunizations were reviewed: patient is up-to-date with his immunizations  Counseling:  Dental Health: discussed importance of regular tooth brushing, flossing, and dental visits  BMI Counseling: Body mass index is 33 05 kg/m²  Discussed the patient's BMI with him  The BMI is above average  BMI counseling and education was provided to the patient  Nutrition recommendations include 3-5 servings of fruits/vegetables daily, decreasing soda and/or juice intake and increasing intake of lean protein  Exercise recommendations include moderate aerobic physical activity for 150 minutes/week  Sexual health: discussed sexually transmitted diseases, partner selection, use of condoms, avoidance of unintended pregnancy, and contraceptive alternatives    · Alcohol/drug use: discussed moderation in alcohol intake and avoidance of illicit drug use     No follow-ups on file  Chief Complaint:     Chief Complaint   Patient presents with    Physical Exam      History of Present Illness:     Adult Annual Physical   Patient here for a comprehensive physical exam  The patient reports no problems  Other than feeling tired and fatigue for the last few weeks  H/o sleep apnea and not using his CPAP machine     Diet and Physical Activity  · Diet/Nutrition: low calorie diet and consuming 3-5 servings of fruits/vegetables daily  · Weight concerns: patient has class 1 obesity (BMI 30-34 9)  · Exercise: no formal exercise  Depression Screening  PHQ-9 Depression Screening    PHQ-9:    Frequency of the following problems over the past two weeks:            General Health  · Sleep: sleeps well  · Hearing: normal - bilateral   · Vision: goes for regular eye exams  · Dental: regular dental visits   Health  · Erectile dysfunction: no        Review of Systems:     Review of Systems   Past Medical History:     Past Medical History:   Diagnosis Date    Hypertension       Past Surgical History:     Past Surgical History:   Procedure Laterality Date    CARDIAC CATHETERIZATION      OH COLONOSCOPY FLX DX W/COLLJ SPEC WHEN PFRMD N/A 4/25/2016    Procedure: COLONOSCOPY;  Surgeon: Lola Caruso MD;  [facility]; Service: Gastroenterology    OH SIGMOIDOSCOPY FLX DX W/COLLJ SPEC BR/WA IF PFRMD N/A 12/4/2018    Procedure: Tamea Krabbe;  Surgeon: Sri Rubio MD;  [facility];   Service: Gastroenterology      Social History:     Social History     Socioeconomic History    Marital status: /Civil Union     Spouse name: None    Number of children: None    Years of education: None    Highest education level: None   Occupational History    None   Social Needs    Financial resource strain: None    Food insecurity:     Worry: None     Inability: None    Transportation needs:     Medical: None     Non-medical: None Tobacco Use    Smoking status: Former Smoker    Smokeless tobacco: Current User   Substance and Sexual Activity    Alcohol use: Yes     Comment: social    Drug use: No    Sexual activity: None   Lifestyle    Physical activity:     Days per week: None     Minutes per session: None    Stress: None   Relationships    Social connections:     Talks on phone: None     Gets together: None     Attends Shinto service: None     Active member of club or organization: None     Attends meetings of clubs or organizations: None     Relationship status: None    Intimate partner violence:     Fear of current or ex partner: None     Emotionally abused: None     Physically abused: None     Forced sexual activity: None   Other Topics Concern    None   Social History Narrative    Caffeine use: 2 servings daily      Family History:     Family History   Problem Relation Age of Onset    Diabetes Mother     Heart disease Father         Cardiac Disorder    Heart disease Brother         Cardiac Disorder      Current Medications:     Current Outpatient Medications   Medication Sig Dispense Refill    losartan (COZAAR) 50 mg tablet TAKE 1 TABLET BY MOUTH EVERY DAY AS DIRECTED 90 tablet 0    mesalamine (ASACOL) 800 MG EC tablet Take 2 tablets (1,600 mg total) by mouth 3 (three) times a day 180 tablet 5    MULTIPLE VITAMIN PO Take by mouth   aspirin 81 MG tablet Take 81 mg by mouth daily  No current facility-administered medications for this visit  Allergies:     No Known Allergies   Objective:     /92 (BP Location: Left arm, Patient Position: Sitting, Cuff Size: Standard)   Pulse 97   Temp 97 7 °F (36 5 °C)   Resp 18   Ht 5' 7" (1 702 m)   Wt 95 7 kg (211 lb)   SpO2 99%   BMI 33 05 kg/m²     Physical Exam   Constitutional: He is oriented to person, place, and time  He appears well-developed and well-nourished  HENT:   Head: Normocephalic and atraumatic     Eyes: Pupils are equal, round, and reactive to light  Neck: Normal range of motion  Neck supple  Cardiovascular: Normal rate and regular rhythm  Pulmonary/Chest: Effort normal and breath sounds normal    Abdominal: Soft  Musculoskeletal: Normal range of motion  Neurological: He is alert and oriented to person, place, and time  Skin: Skin is warm  Capillary refill takes less than 2 seconds  Psychiatric: He has a normal mood and affect  His behavior is normal         Health Maintenance:     Health Maintenance   Topic Date Due    Hepatitis C Screening  1965    BMI: Followup Plan  01/01/1982    CRC Screening: Colonoscopy  04/25/2017    Depression Screening PHQ  11/30/2019    BMI: Adult  03/22/2020    DTaP,Tdap,and Td Vaccines (2 - Td) 06/09/2026    INFLUENZA VACCINE  Completed    HEPATITIS B VACCINES  Aged Out     Immunization History   Administered Date(s) Administered    INFLUENZA 12/22/2016, 12/21/2017, 12/03/2018    Influenza Quadrivalent Preservative Free 3 years and older IM 11/20/2014, 12/03/2015, 12/22/2016, 12/21/2017    Influenza, recombinant, quadrivalent,injectable, preservative free 12/03/2018    Pneumococcal Polysaccharide PPV23 05/22/2014    Tdap 06/09/2016   BMI Counseling: Body mass index is 33 05 kg/m²  Discussed the patient's BMI with him  The BMI is above average  BMI counseling and education was provided to the patient  Nutrition recommendations include decreasing overall calorie intake, 3-5 servings of fruits/vegetables daily, consuming healthier snacks, decreasing soda and/or juice intake and moderation in carbohydrate intake  Exercise recommendations include moderate aerobic physical activity for 150 minutes/week         Jose Jasso MD  [address] [facility] (On Arrival)

## 2021-04-26 DIAGNOSIS — I10 ESSENTIAL HYPERTENSION: ICD-10-CM

## 2021-04-26 RX ORDER — LOSARTAN POTASSIUM 50 MG/1
50 TABLET ORAL DAILY
Qty: 30 TABLET | Refills: 0 | Status: SHIPPED | OUTPATIENT
Start: 2021-04-26 | End: 2021-06-17 | Stop reason: SDUPTHER

## 2021-04-26 RX ORDER — LOSARTAN POTASSIUM 50 MG/1
50 TABLET ORAL DAILY
Qty: 30 TABLET | Refills: 0 | Status: SHIPPED | OUTPATIENT
Start: 2021-04-26 | End: 2021-04-26 | Stop reason: SDUPTHER

## 2021-04-26 NOTE — TELEPHONE ENCOUNTER
Medication: losartan (COZAAR)  Dosage: 50 mg tablet   How Often:TAKE 1 TABLET BY MOUTH EVERY DAY AS DIRECTED  Quantity:  30  Last Office Visit: 3/22/2019  Next Office Visit:5/5/2021  Last refilled:06/2020  How many pills left: 7  Pharmacy:     [facility] [address], [address]  [address]  [facility] [address]  Phone: [phone] Fax: [phone]

## 2021-04-26 NOTE — TELEPHONE ENCOUNTER
Patient called and stated that the medication was sent to the wrong pharmacy can we please resend  The losartan (COZAAR) 50 mg tablet [[id number]]     Order Details  Dose: 50 mg Route: Oral Frequency: Daily   Note to Pharmacy:   PATIENT NEEDS APPOINTMENT        Dispense Quantity: 30 tablet Refills: 0 Fills remaining: --           Sig: Take 1 tablet (50 mg total) by mouth daily As directed               [facility]

## 2021-05-13 ENCOUNTER — OFFICE VISIT (OUTPATIENT)
Dept: FAMILY MEDICINE CLINIC | Facility: CLINIC | Age: 56
End: 2021-05-13
Payer: COMMERCIAL

## 2021-05-13 VITALS
HEIGHT: 67 IN | WEIGHT: 198.4 LBS | BODY MASS INDEX: 31.14 KG/M2 | RESPIRATION RATE: 16 BRPM | TEMPERATURE: 96.5 F | HEART RATE: 70 BPM | OXYGEN SATURATION: 98 % | DIASTOLIC BLOOD PRESSURE: 72 MMHG | SYSTOLIC BLOOD PRESSURE: 122 MMHG

## 2021-05-13 DIAGNOSIS — Z12.5 PROSTATE CANCER SCREENING: ICD-10-CM

## 2021-05-13 DIAGNOSIS — I10 ESSENTIAL HYPERTENSION: ICD-10-CM

## 2021-05-13 DIAGNOSIS — K51.919 ULCERATIVE COLITIS WITH COMPLICATION, UNSPECIFIED LOCATION (HCC): ICD-10-CM

## 2021-05-13 DIAGNOSIS — Z00.00 ANNUAL PHYSICAL EXAM: Primary | ICD-10-CM

## 2021-05-13 DIAGNOSIS — Z11.4 ENCOUNTER FOR SCREENING FOR HIV: ICD-10-CM

## 2021-05-13 PROCEDURE — 1036F TOBACCO NON-USER: CPT | Performed by: FAMILY MEDICINE

## 2021-05-13 PROCEDURE — 3008F BODY MASS INDEX DOCD: CPT | Performed by: FAMILY MEDICINE

## 2021-05-13 PROCEDURE — 99396 PREV VISIT EST AGE 40-64: CPT | Performed by: FAMILY MEDICINE

## 2021-05-13 PROCEDURE — 3725F SCREEN DEPRESSION PERFORMED: CPT | Performed by: FAMILY MEDICINE

## 2021-05-13 NOTE — PATIENT INSTRUCTIONS

## 2021-05-13 NOTE — PROGRESS NOTES
[address]    NAME: Riri Mireles  AGE: 61 y o  SEX: male  : 1965     DATE: 2021     Assessment and Plan:     Problem List Items Addressed This Visit        Digestive    Ulcerative colitis [facility])       Cardiovascular and Mediastinum    Essential hypertension    Relevant Orders    Comprehensive metabolic panel    Lipid Panel with Direct LDL reflex      Other Visit Diagnoses     Annual physical exam    -  Primary    Prostate cancer screening        Relevant Orders    PSA, total and free    Encounter for screening for HIV        Relevant Orders    HIV 1/2 ANTIGEN/ANTIBODY (4TH GENERATION) W REFLEX [facility]          Immunizations and preventive care screenings were discussed with patient today  Appropriate education was printed on patient's after visit summary  Counseling:  Alcohol/drug use: discussed moderation in alcohol intake, the recommendations for healthy alcohol use, and avoidance of illicit drug use  Dental Health: discussed importance of regular tooth brushing, flossing, and dental visits  Injury prevention: discussed safety/seat belts, safety helmets, smoke detectors, carbon dioxide detectors, and smoking near bedding or upholstery  Sexual health: discussed sexually transmitted diseases, partner selection, use of condoms, avoidance of unintended pregnancy, and contraceptive alternatives  · Exercise: the importance of regular exercise/physical activity was discussed  Recommend exercise 3-5 times per week for at least 30 minutes  BMI Counseling: Body mass index is 30 92 kg/m²  The BMI is above normal  Nutrition recommendations include decreasing portion sizes and encouraging healthy choices of fruits and vegetables  Exercise recommendations include moderate physical activity 150 minutes/week  No pharmacotherapy was ordered  No follow-ups on file       Chief Complaint:     Chief Complaint   Patient presents with    Physical Exam     Patient is here today for an annual exam       History of Present Illness:     Adult Annual Physical   Patient here for a comprehensive physical exam  The patient reports no problems  Diet and Physical Activity  · Diet/Nutrition: well balanced diet and consuming 3-5 servings of fruits/vegetables daily  · Exercise: walking  Depression Screening  PHQ-9 Depression Screening    PHQ-9:   Frequency of the following problems over the past two weeks:      Little interest or pleasure in doing things: 0 - not at all  Feeling down, depressed, or hopeless: 0 - not at all  PHQ-2 Score: 0       General Health  · Sleep: sleeps well and gets 7-8 hours of sleep on average  · Hearing: normal - bilateral   · Vision: most recent eye exam >1 year ago  · Dental: regular dental visits and brushes teeth twice daily   Health  · Symptoms include: none     Review of Systems:     Review of Systems   Constitutional: Negative  Negative for chills and fever  HENT: Negative  Negative for ear pain and sore throat  Eyes: Negative  Negative for pain and visual disturbance  Respiratory: Negative  Negative for cough and shortness of breath  Cardiovascular: Negative for chest pain and palpitations  Gastrointestinal: Negative  Negative for abdominal pain and vomiting  Genitourinary: Negative for dysuria and hematuria  Musculoskeletal: Negative for arthralgias and back pain  Skin: Negative for color change and rash  Neurological: Negative  Negative for seizures and syncope  Hematological: Negative  Psychiatric/Behavioral: Negative  All other systems reviewed and are negative       Past Medical History:     Past Medical History:   Diagnosis Date    Colitis     Hypertension     Sleep apnea       Past Surgical History:     Past Surgical History:   Procedure Laterality Date    CARDIAC CATHETERIZATION      COLONOSCOPY      AR COLONOSCOPY FLX DX W/COLLJ SPEC WHEN PFRMD N/A 4/25/2016    Procedure: COLONOSCOPY;  Surgeon: Juliana Andres MD;  [facility]; Service: Gastroenterology    IL SIGMOIDOSCOPY FLX DX W/COLLJ SPEC BR/WA IF PFRMD N/A 12/4/2018    Procedure: Luz Elena Paul;  Surgeon: Shaye Dillon MD;  [facility];   Service: Gastroenterology      Family History:     Family History   Problem Relation Age of Onset    Diabetes Mother     Heart disease Father         Cardiac Disorder    Heart disease Brother         Cardiac Disorder      Social History:     E-Cigarette/Vaping    E-Cigarette Use Current Some Day User      E-Cigarette/Vaping Substances    Nicotine Yes      Social History     Socioeconomic History    Marital status: /Civil Union     Spouse name: None    Number of children: None    Years of education: None    Highest education level: None   Occupational History    None   Social Needs    Financial resource strain: None    Food insecurity     Worry: None     Inability: None    Transportation needs     Medical: None     Non-medical: None   Tobacco Use    Smoking status: Former Smoker    Smokeless tobacco: Current User   Substance and Sexual Activity    Alcohol use: Yes     Frequency: Monthly or less     Drinks per session: 1 or 2     Binge frequency: Less than monthly     Comment: social    Drug use: No    Sexual activity: None   Lifestyle    Physical activity     Days per week: None     Minutes per session: None    Stress: None   Relationships    Social connections     Talks on phone: None     Gets together: None     Attends Rastafarian service: None     Active member of club or organization: None     Attends meetings of clubs or organizations: None     Relationship status: None    Intimate partner violence     Fear of current or ex partner: None     Emotionally abused: None     Physically abused: None     Forced sexual activity: None   Other Topics Concern    None   Social History Narrative    Caffeine use: 2 servings daily      Current Medications:     Current Outpatient Medications   Medication Sig Dispense Refill    losartan (COZAAR) 50 mg tablet Take 1 tablet (50 mg total) by mouth daily As directed 30 tablet 0    mesalamine (ASACOL) 800 MG EC tablet Take 2 tablets by mouth three times a day  180 tablet 5     No current facility-administered medications for this visit  Allergies:     No Known Allergies   Physical Exam:     /72 (BP Location: Left arm, Patient Position: Sitting, Cuff Size: Adult)   Pulse 70   Temp (!) 96 5 °F (35 8 °C) (Tympanic)   Resp 16   Ht 5' 7 17" (1 706 m)   Wt 90 kg (198 lb 6 4 oz)   SpO2 98%   BMI 30 92 kg/m²     Physical Exam  Vitals signs and nursing note reviewed  Constitutional:       Appearance: Normal appearance  He is well-developed  HENT:      Head: Normocephalic and atraumatic  Eyes:      Conjunctiva/sclera: Conjunctivae normal    Neck:      Musculoskeletal: Neck supple  Cardiovascular:      Rate and Rhythm: Normal rate and regular rhythm  Heart sounds: No murmur  Pulmonary:      Effort: Pulmonary effort is normal  No respiratory distress  Breath sounds: Normal breath sounds  Abdominal:      Palpations: Abdomen is soft  Tenderness: There is no abdominal tenderness  Genitourinary:     Comments: Refused prostate exam  Skin:     General: Skin is warm and dry  Neurological:      Mental Status: He is alert            Saray Benton MD  [facility]

## 2021-05-14 ENCOUNTER — IMMUNIZATIONS (OUTPATIENT)
Dept: FAMILY MEDICINE CLINIC | Facility: HOSPITAL | Age: 56
End: 2021-05-14

## 2021-05-14 DIAGNOSIS — Z23 ENCOUNTER FOR IMMUNIZATION: Primary | ICD-10-CM

## 2021-05-14 PROCEDURE — 91300 SARS-COV-2 / COVID-19 MRNA VACCINE (PFIZER-BIONTECH) 30 MCG: CPT

## 2021-05-14 PROCEDURE — 0002A SARS-COV-2 / COVID-19 MRNA VACCINE (PFIZER-BIONTECH) 30 MCG: CPT

## 2021-05-30 NOTE — RESULT NOTES
Addendum  created 05/30/21 1329 by Wolf Rider MD    Clinical Note Signed       Verified Results  (1) COMPREHENSIVE METABOLIC PANEL [id number] 12:66PR Jayde Leal   REPORT COMMENT:  FASTING:YES     Test Name Result Flag Reference   GLUCOSE 105 mg/dL H 65-99   Fasting reference interval   UREA NITROGEN (BUN) 16 mg/dL  7-25   CREATININE 0 98 mg/dL  0 70-1 33   For patients >52years of age, the reference limit  for Creatinine is approximately 13% higher for people  identified as -American  eGFR NON-AFR  AMERICAN 88 mL/min/1 73m2  > OR = 60   eGFR AFRICAN AMERICAN 102 mL/min/1 73m2  > OR = 60   BUN/CREATININE RATIO   2-24   NOT APPLICABLE (calc)   SODIUM 142 mmol/L  135-146   POTASSIUM 5 0 mmol/L  3 5-5 3   CHLORIDE 107 mmol/L     CARBON DIOXIDE 26 mmol/L  19-30   CALCIUM 9 4 mg/dL  8 6-10 3   PROTEIN, TOTAL 6 7 g/dL  6 1-8 1   ALBUMIN 4 3 g/dL  3 6-5 1   GLOBULIN 2 4 g/dL (calc)  1 9-3 7   ALBUMIN/GLOBULIN RATIO 1 8 (calc)  1 0-2 5   BILIRUBIN, TOTAL 0 5 mg/dL  0 2-1 2   ALKALINE PHOSPHATASE 71 U/L     AST 15 U/L  10-35   ALT 18 U/L  9-46     (1) LIPID PANEL, FASTING [id number] 06:44AM Jayde Leal     Test Name Result Flag Reference   CHOLESTEROL, TOTAL 154 mg/dL  125-200   HDL CHOLESTEROL 47 mg/dL  > OR = 40   TRIGLICERIDES 60 mg/dL  <714   LDL-CHOLESTEROL 95 mg/dL (calc)  <130   Desirable range <100 mg/dL for patients with CHD or  diabetes and <70 mg/dL for diabetic patients with  known heart disease  CHOL/HDLC RATIO 3 3 (calc)  < OR = 5 0   NON HDL CHOLESTEROL 107 mg/dL (calc)     Target for non-HDL cholesterol is 30 mg/dL higher than   LDL cholesterol target       (Q) MICROALBUMIN, RANDOM URINE (W/CREATININE) [id number] 06:44AM Jayde Leal     Test Name Result Flag Reference   CREATININE, RANDOM URINE 126 mg/dL     MICROALBUMIN 0 3 mg/dL     Reference Range  Not established   MICROALBUMIN/CREATININE$RATIO, RANDOM URINE 2 mcg/mg creat  <30   The ADA defines abnormalities in albumin  excretion as follows:     Category         Result (mcg/mg creatinine) Normal                    <30  Microalbuminuria            Clinical albuminuria   > OR = 300     The ADA recommends that at least two of three  specimens collected within a 3-6 month period be  abnormal before considering a patient to be  within a diagnostic category  Discussion/Summary   FASTING BLOOD SUGAR SLIGHTLY HIGH   WATCH SWEETS AND FATTY FOOD    NORMAL LIVER AND KIDNEY FUNCTION    OVERALL LOOKS GOOD   - DR HERNANDEZ      Signatures   Electronically signed by : Belle Kruse MD; Jun 8 2016 10:47AM EST                       (Author)

## 2021-06-17 DIAGNOSIS — I10 ESSENTIAL HYPERTENSION: ICD-10-CM

## 2021-06-17 RX ORDER — LOSARTAN POTASSIUM 50 MG/1
50 TABLET ORAL DAILY
Qty: 30 TABLET | Refills: 5 | Status: SHIPPED | OUTPATIENT
Start: 2021-06-17 | End: 2021-12-17

## 2021-07-09 ENCOUNTER — TELEPHONE (OUTPATIENT)
Dept: GASTROENTEROLOGY | Facility: CLINIC | Age: 56
End: 2021-07-09

## 2021-07-09 DIAGNOSIS — K51.80 OTHER ULCERATIVE COLITIS WITHOUT COMPLICATION (HCC): Primary | ICD-10-CM

## 2021-07-09 DIAGNOSIS — K51.80 OTHER ULCERATIVE COLITIS WITHOUT COMPLICATION (HCC): ICD-10-CM

## 2021-07-09 RX ORDER — MESALAMINE 800 MG/1
TABLET, DELAYED RELEASE ORAL
Qty: 180 TABLET | Refills: 5 | Status: SHIPPED | OUTPATIENT
Start: 2021-07-09 | End: 2022-01-01

## 2021-07-09 RX ORDER — MESALAMINE 800 MG/1
TABLET, DELAYED RELEASE ORAL
Qty: 180 TABLET | Refills: 0 | Status: CANCELLED | OUTPATIENT
Start: 2021-07-09

## 2021-07-09 NOTE — TELEPHONE ENCOUNTER
Pt of Dr Gui Palomares last seen 7/2/2020  Future appt 9/22/21  HX: pancolitis  Pt requesting refill of mesalamine   Reordered lab orders for pt

## 2021-07-09 NOTE — TELEPHONE ENCOUNTER
Patients GI provider:  Dr Gabriel Murillo    Number to return call: [phone]    Reason for call: Pt calling requesting lab orders for Calprotectin, fecal, C-reactive protein & Sedimentation rate, automated be reordered as they       Scheduled procedure/appointment date if applicable: Appt - 16/71/10

## 2021-07-09 NOTE — TELEPHONE ENCOUNTER
GI Physician: Dr Zoie Llanos for Medication: Mesalamine    Dose: 800 mg    Quantity: 180    Pharmacy and Location: [facility]

## 2021-08-19 ENCOUNTER — APPOINTMENT (OUTPATIENT)
Dept: LAB | Facility: CLINIC | Age: 56
End: 2021-08-19
Payer: COMMERCIAL

## 2021-08-19 DIAGNOSIS — K51.80 OTHER ULCERATIVE COLITIS WITHOUT COMPLICATION (HCC): ICD-10-CM

## 2021-08-19 LAB
CRP SERPL QL: 9.7 MG/L
ERYTHROCYTE [SEDIMENTATION RATE] IN BLOOD: 15 MM/HOUR (ref 0–19)

## 2021-08-19 PROCEDURE — 36415 COLL VENOUS BLD VENIPUNCTURE: CPT

## 2021-08-19 PROCEDURE — 86140 C-REACTIVE PROTEIN: CPT

## 2021-08-19 PROCEDURE — 85652 RBC SED RATE AUTOMATED: CPT

## 2021-09-22 ENCOUNTER — OFFICE VISIT (OUTPATIENT)
Dept: GASTROENTEROLOGY | Facility: AMBULARY SURGERY CENTER | Age: 56
End: 2021-09-22
Payer: COMMERCIAL

## 2021-09-22 VITALS
DIASTOLIC BLOOD PRESSURE: 86 MMHG | HEIGHT: 67 IN | BODY MASS INDEX: 31.99 KG/M2 | WEIGHT: 203.8 LBS | SYSTOLIC BLOOD PRESSURE: 128 MMHG

## 2021-09-22 DIAGNOSIS — K51.00 ULCERATIVE PANCOLITIS (HCC): Primary | ICD-10-CM

## 2021-09-22 DIAGNOSIS — R85.7 ABNORMAL RECTAL BIOPSY: ICD-10-CM

## 2021-09-22 DIAGNOSIS — R10.13 DYSPEPSIA: ICD-10-CM

## 2021-09-22 PROCEDURE — 3008F BODY MASS INDEX DOCD: CPT | Performed by: PHYSICIAN ASSISTANT

## 2021-09-22 PROCEDURE — 1036F TOBACCO NON-USER: CPT | Performed by: PHYSICIAN ASSISTANT

## 2021-09-22 PROCEDURE — 99214 OFFICE O/P EST MOD 30 MIN: CPT | Performed by: PHYSICIAN ASSISTANT

## 2021-09-22 RX ORDER — FAMOTIDINE 20 MG/1
20 TABLET, FILM COATED ORAL DAILY
Qty: 30 TABLET | Refills: 5 | Status: SHIPPED | OUTPATIENT
Start: 2021-09-22 | End: 2022-01-13 | Stop reason: ALTCHOICE

## 2021-09-22 RX ORDER — SOD SULF/POT CHLORIDE/MAG SULF 1.479 G
TABLET ORAL
Qty: 24 TABLET | Refills: 0 | Status: SHIPPED | OUTPATIENT
Start: 2021-09-22 | End: 2022-01-13 | Stop reason: ALTCHOICE

## 2021-09-22 NOTE — ASSESSMENT & PLAN NOTE
Patient does complain of postprandial bloating and gassiness, likely functional/nonulcer dyspepsia but cannot rule out gastritis/peptic ulcer disease, with or without H pylori infection     -will plan for EGD at the same time as colonoscopy     -again patient was advised regarding risks and benefits of the procedures     -will prescribe Pepcid 20 mg once daily    -advised patient about avoidance of GERD triggers and gas producing foods

## 2021-09-22 NOTE — ASSESSMENT & PLAN NOTE
Clinically appears to be in remission on mesalamine at this time, however his rectal biopsy at the time of last colonoscopy was indefinite for dysplasia; rule out any adenomatous change or developing malignancy    -continue mesalamine     -will schedule patient for colonoscopy, the same time as EGD, see below     -procedure was explained in detail to the patient at this time including associated risks and benefits, risks including but not limited to infection, perforation and bleeding     -prescription and instructions were provided for colonoscopy prep     -will check CBC and CMP preoperatively

## 2021-09-22 NOTE — PROGRESS NOTES
Follow-up Note -  [facility]  Isma Holcomb 1965 61 y o  male         Reason:  Follow-up; ulcerative colitis with rectal biopsy indefinite for dysplasia, bloating    HPI:  67-year-old male with history of sleep apnea who presents for follow-up, he was last seen in our office in July 2020, following up on recent colonoscopy showing ulcerative pan colitis, confirmed on multiple biopsies, was also noted that rectal biopsy was indefinite for dysplasia so he was recommended for colonoscopy in 3-6 months  Patient said he had had some difficulty following up in the setting of the COVID pandemic, but otherwise has been doing well, he remains on mesalamine for maintenance therapy, he reports regular bowel movements which are formed, brown, occurring once daily on average  Denies any rectal bleeding or melena  He says he does take gassiness and bloating fairly often, usually after eating, he denies any acid reflux or heartburn specifically, or any nausea or vomiting  Denies any difficulty or discomfort with swallowing  He has not had EGD before    REVIEW OF SYSTEMS:      CONSTITUTIONAL: Denies any fever, chills, or rigors  Good appetite, and no recent weight loss  HEENT: No earache or tinnitus  Denies hearing loss or visual disturbances  CARDIOVASCULAR: No chest pain or palpitations  RESPIRATORY: Denies any cough, hemoptysis, shortness of breath or dyspnea on exertion  GASTROINTESTINAL: As noted in the History of Present Illness  GENITOURINARY: No problems with urination  Denies any hematuria or dysuria  NEUROLOGIC: No dizziness or vertigo, denies headaches  MUSCULOSKELETAL: Denies any muscle or joint pain  SKIN: Denies skin rashes or itching  ENDOCRINE: Denies excessive thirst  Denies intolerance to heat or cold  PSYCHOSOCIAL: Denies depression or anxiety  Denies any recent memory loss       Past Medical History:   Diagnosis Date    Colitis     Hypertension     Sleep apnea Past Surgical History:   Procedure Laterality Date    CARDIAC CATHETERIZATION      COLONOSCOPY      ME COLONOSCOPY FLX DX W/COLLJ SPEC WHEN PFRMD N/A 4/25/2016    Procedure: COLONOSCOPY;  Surgeon: Harris Flores MD;  [facility]; Service: Gastroenterology    ME SIGMOIDOSCOPY FLX DX W/COLLJ SPEC BR/WA IF PFRMD N/A 12/4/2018    Procedure: Prashant Jacob;  Surgeon: Savannah Vargas MD;  [facility]; Service: Gastroenterology     Social History     Socioeconomic History    Marital status: /Civil Union     Spouse name: Not on file    Number of children: Not on file    Years of education: Not on file    Highest education level: Not on file   Occupational History    Not on file   Tobacco Use    Smoking status: Former Smoker    Smokeless tobacco: Current User   Vaping Use    Vaping Use: Some days    Substances: Nicotine   Substance and Sexual Activity    Alcohol use: Yes     Comment: social    Drug use: No    Sexual activity: Not on file   Other Topics Concern    Not on file   Social History Narrative    Caffeine use: 2 servings daily     Social Determinants of Health     Financial Resource Strain:     Difficulty of Paying Living Expenses:    Food Insecurity:     Worried About Running Out of Food in the Last Year:     [address] in the Last Year:    Transportation Needs:     Lack of Transportation (Medical):      Lack of Transportation (Non-Medical):    Physical Activity:     Days of Exercise per Week:     Minutes of Exercise per Session:    Stress:     Feeling of Stress :    Social Connections:     Frequency of Communication with Friends and Family:     Frequency of Social Gatherings with Friends and Family:     Attends Muslim Services:     Active Member of Clubs or Organizations:     Attends Club or Organization Meetings:     Marital Status:    Intimate Partner Violence:     Fear of Current or Ex-Partner:     Emotionally Abused:     Physically Abused:     Sexually Abused:      Family History   Problem Relation Age of Onset    Diabetes Mother     Heart disease Father         Cardiac Disorder    Heart disease Brother         Cardiac Disorder     Patient has no known allergies  Current Outpatient Medications   Medication Sig Dispense Refill    losartan (COZAAR) 50 mg tablet Take 1 tablet (50 mg total) by mouth daily As directed 30 tablet 5    mesalamine (ASACOL) 800 MG EC tablet Take 2 tablets by mouth three times a day  180 tablet 5    famotidine (PEPCID) 20 mg tablet Take 1 tablet (20 mg total) by mouth daily 30 tablet 5    Sodium Sulfate-Mag Sulfate-KCl (Sutab) 5143-160-144 MG TABS Please administer according to instructions provided by our office 24 tablet 0     No current facility-administered medications for this visit  Blood pressure 128/86, height 5' 7 17" (1 706 m), weight 92 4 kg (203 lb 12 8 oz)  PHYSICAL EXAM:      General Appearance:   Alert, cooperative, no distress, appears stated age    HEENT:   Normocephalic, atraumatic, anicteric      Neck:  Supple, symmetrical, trachea midline, no adenopathy;    thyroid: no enlargement/tenderness/nodules; no carotid  bruit or JVD    Lungs:   Clear to auscultation bilaterally; no rales, rhonchi or wheezing; respirations unlabored    Heart[de-identified]   S1 and S2 normal; regular rate and rhythm; no murmur, rub, or gallop     Abdomen:   Soft, non-tender, non-distended; normal bowel sounds; no masses, no organomegaly    Extremities: No edema, erythema, wounds, rashes   Rectal:   Deferred                      Lab Results   Component Value Date    WBC 7 56 04/25/2020    HGB 14 7 04/25/2020    HCT 44 9 04/25/2020    MCV 93 04/25/2020     04/25/2020     Lab Results   Component Value Date    CALCIUM 8 8 04/25/2020     12/16/2017    K 4 0 04/25/2020    CO2 30 04/25/2020     04/25/2020    BUN 13 04/25/2020    CREATININE 1 20 04/25/2020     Lab Results   Component Value Date    ALT 45 04/25/2020    AST 22 04/25/2020    ALKPHOS 83 04/25/2020    BILITOT 0 3 12/16/2017     Lab Results   Component Value Date    INR 1 15 12/01/2018    PROTIME 14 8 (H) 12/01/2018       Colonoscopy    Result Date: 5/6/2020  Impression: 1  Multiple subcentimeter pseudopolyps noted in the transverse colon, descending and sigmoid  Several of pseudopolyps in the transverse were removed  2  Mild distal colitis noted in the rectum  3  Remainder of the colonic mucosa appeared unremarkable, biopsies were taken from each segment to assess for dysplasia  Terminal ileum was intubated and appeared normal  4  Small internal hemorrhoids  RECOMMENDATION: Repeat in 2 years due to a personal history of colon polyps and history of ulcerative colitis  Follow biopsy results  Encourage taking Asacol on daily basis  Add Rowasa 4 mg enema at nighttime     Garrett Gutierres MD       ASSESSMENT & PLAN:    Ulcerative pancolitis ([address] )  Clinically appears to be in remission on mesalamine at this time, however his rectal biopsy at the time of last colonoscopy was indefinite for dysplasia; rule out any adenomatous change or developing malignancy    -continue mesalamine     -will schedule patient for colonoscopy, the same time as EGD, see below     -procedure was explained in detail to the patient at this time including associated risks and benefits, risks including but not limited to infection, perforation and bleeding     -prescription and instructions were provided for colonoscopy prep     -will check CBC and CMP preoperatively         Dyspepsia  Patient does complain of postprandial bloating and gassiness, likely functional/nonulcer dyspepsia but cannot rule out gastritis/peptic ulcer disease, with or without H pylori infection     -will plan for EGD at the same time as colonoscopy     -again patient was advised regarding risks and benefits of the procedures     -will prescribe Pepcid 20 mg once daily    -advised patient about avoidance of GERD triggers and gas producing foods

## 2021-10-26 ENCOUNTER — TELEPHONE (OUTPATIENT)
Dept: GASTROENTEROLOGY | Facility: AMBULARY SURGERY CENTER | Age: 56
End: 2021-10-26

## 2021-10-29 ENCOUNTER — TELEPHONE (OUTPATIENT)
Dept: GASTROENTEROLOGY | Facility: AMBULARY SURGERY CENTER | Age: 56
End: 2021-10-29

## 2021-10-29 DIAGNOSIS — K51.00 ULCERATIVE PANCOLITIS (HCC): Primary | ICD-10-CM

## 2021-10-29 RX ORDER — POLYETHYLENE GLYCOL 3350, SODIUM CHLORIDE, SODIUM BICARBONATE, POTASSIUM CHLORIDE 420; 11.2; 5.72; 1.48 G/4L; G/4L; G/4L; G/4L
4000 POWDER, FOR SOLUTION ORAL ONCE
Qty: 4000 ML | Refills: 0 | Status: SHIPPED | OUTPATIENT
Start: 2021-10-29 | End: 2022-01-13 | Stop reason: ALTCHOICE

## 2021-12-11 LAB
ALBUMIN SERPL-MCNC: 4 G/DL (ref 3.6–5.1)
ALBUMIN/GLOB SERPL: 1.4 (CALC) (ref 1–2.5)
ALP SERPL-CCNC: 96 U/L (ref 35–144)
ALT SERPL-CCNC: 24 U/L (ref 9–46)
AST SERPL-CCNC: 19 U/L (ref 10–35)
BILIRUB SERPL-MCNC: 0.4 MG/DL (ref 0.2–1.2)
BUN SERPL-MCNC: 14 MG/DL (ref 7–25)
BUN/CREAT SERPL: NORMAL (CALC) (ref 6–22)
CALCIUM SERPL-MCNC: 9.3 MG/DL (ref 8.6–10.3)
CHLORIDE SERPL-SCNC: 105 MMOL/L (ref 98–110)
CHOLEST SERPL-MCNC: 142 MG/DL
CHOLEST/HDLC SERPL: 2.9 (CALC)
CO2 SERPL-SCNC: 28 MMOL/L (ref 20–32)
CREAT SERPL-MCNC: 1.02 MG/DL (ref 0.7–1.33)
GLOBULIN SER CALC-MCNC: 2.9 G/DL (CALC) (ref 1.9–3.7)
GLUCOSE SERPL-MCNC: 97 MG/DL (ref 65–99)
HDLC SERPL-MCNC: 49 MG/DL
HIV 1+2 AB+HIV1 P24 AG SERPL QL IA: NORMAL
LDLC SERPL CALC-MCNC: 80 MG/DL (CALC)
NONHDLC SERPL-MCNC: 93 MG/DL (CALC)
POTASSIUM SERPL-SCNC: 4.5 MMOL/L (ref 3.5–5.3)
PROT SERPL-MCNC: 6.9 G/DL (ref 6.1–8.1)
PSA FREE MFR SERPL: 14 % (CALC)
PSA FREE SERPL-MCNC: 0.1 NG/ML
PSA SERPL-MCNC: 0.7 NG/ML
SL AMB EGFR AFRICAN AMERICAN: 94 ML/MIN/1.73M2
SL AMB EGFR NON AFRICAN AMERICAN: 81 ML/MIN/1.73M2
SODIUM SERPL-SCNC: 140 MMOL/L (ref 135–146)
TRIGL SERPL-MCNC: 54 MG/DL

## 2021-12-17 DIAGNOSIS — I10 ESSENTIAL HYPERTENSION: ICD-10-CM

## 2021-12-17 RX ORDER — LOSARTAN POTASSIUM 50 MG/1
TABLET ORAL
Qty: 30 TABLET | Refills: 5 | Status: SHIPPED | OUTPATIENT
Start: 2021-12-17 | End: 2022-06-08

## 2021-12-27 DIAGNOSIS — K51.80 OTHER ULCERATIVE COLITIS WITHOUT COMPLICATION (HCC): ICD-10-CM

## 2021-12-29 ENCOUNTER — IMMUNIZATIONS (OUTPATIENT)
Dept: FAMILY MEDICINE CLINIC | Facility: HOSPITAL | Age: 56
End: 2021-12-29

## 2021-12-29 DIAGNOSIS — Z23 ENCOUNTER FOR IMMUNIZATION: Primary | ICD-10-CM

## 2021-12-29 PROCEDURE — 0001A COVID-19 PFIZER VACC 0.3 ML: CPT

## 2021-12-29 PROCEDURE — 91300 COVID-19 PFIZER VACC 0.3 ML: CPT

## 2022-01-01 RX ORDER — MESALAMINE 800 MG/1
TABLET, DELAYED RELEASE ORAL
Qty: 180 TABLET | Refills: 5 | Status: SHIPPED | OUTPATIENT
Start: 2022-01-01 | End: 2022-04-06 | Stop reason: SDUPTHER

## 2022-01-12 ENCOUNTER — TELEPHONE (OUTPATIENT)
Dept: GASTROENTEROLOGY | Facility: AMBULARY SURGERY CENTER | Age: 57
End: 2022-01-12

## 2022-01-13 ENCOUNTER — ANESTHESIA EVENT (OUTPATIENT)
Dept: GASTROENTEROLOGY | Facility: AMBULARY SURGERY CENTER | Age: 57
End: 2022-01-13

## 2022-01-13 ENCOUNTER — HOSPITAL ENCOUNTER (OUTPATIENT)
Dept: GASTROENTEROLOGY | Facility: AMBULARY SURGERY CENTER | Age: 57
Setting detail: OUTPATIENT SURGERY
Discharge: HOME | End: 2022-01-13
Payer: COMMERCIAL

## 2022-01-13 ENCOUNTER — ANESTHESIA (OUTPATIENT)
Dept: GASTROENTEROLOGY | Facility: AMBULARY SURGERY CENTER | Age: 57
End: 2022-01-13

## 2022-01-13 VITALS
OXYGEN SATURATION: 96 % | RESPIRATION RATE: 22 BRPM | SYSTOLIC BLOOD PRESSURE: 127 MMHG | HEART RATE: 58 BPM | HEIGHT: 67 IN | TEMPERATURE: 97.8 F | BODY MASS INDEX: 32.02 KG/M2 | DIASTOLIC BLOOD PRESSURE: 69 MMHG | WEIGHT: 204 LBS

## 2022-01-13 DIAGNOSIS — R85.7 ABNORMAL RECTAL BIOPSY: ICD-10-CM

## 2022-01-13 DIAGNOSIS — K51.00 ULCERATIVE PANCOLITIS (HCC): ICD-10-CM

## 2022-01-13 PROBLEM — G47.33 OSA (OBSTRUCTIVE SLEEP APNEA): Status: ACTIVE | Noted: 2022-01-13

## 2022-01-13 PROCEDURE — 45380 COLONOSCOPY AND BIOPSY: CPT | Performed by: INTERNAL MEDICINE

## 2022-01-13 PROCEDURE — 88305 TISSUE EXAM BY PATHOLOGIST: CPT | Performed by: PATHOLOGY

## 2022-01-13 RX ORDER — LIDOCAINE HYDROCHLORIDE 10 MG/ML
0.5 INJECTION, SOLUTION EPIDURAL; INFILTRATION; INTRACAUDAL; PERINEURAL ONCE AS NEEDED
Status: DISCONTINUED | OUTPATIENT
Start: 2022-01-13 | End: 2022-01-17 | Stop reason: HOSPADM

## 2022-01-13 RX ORDER — SODIUM CHLORIDE, SODIUM LACTATE, POTASSIUM CHLORIDE, CALCIUM CHLORIDE 600; 310; 30; 20 MG/100ML; MG/100ML; MG/100ML; MG/100ML
125 INJECTION, SOLUTION INTRAVENOUS CONTINUOUS
Status: DISCONTINUED | OUTPATIENT
Start: 2022-01-13 | End: 2022-01-17 | Stop reason: HOSPADM

## 2022-01-13 RX ORDER — PROPOFOL 10 MG/ML
INJECTION, EMULSION INTRAVENOUS AS NEEDED
Status: DISCONTINUED | OUTPATIENT
Start: 2022-01-13 | End: 2022-01-13

## 2022-01-13 RX ORDER — LIDOCAINE HYDROCHLORIDE 10 MG/ML
INJECTION, SOLUTION EPIDURAL; INFILTRATION; INTRACAUDAL; PERINEURAL AS NEEDED
Status: DISCONTINUED | OUTPATIENT
Start: 2022-01-13 | End: 2022-01-13

## 2022-01-13 RX ADMIN — PROPOFOL 150 MG: 10 INJECTION, EMULSION INTRAVENOUS at 14:49

## 2022-01-13 RX ADMIN — PROPOFOL 20 MG: 10 INJECTION, EMULSION INTRAVENOUS at 15:00

## 2022-01-13 RX ADMIN — PROPOFOL 20 MG: 10 INJECTION, EMULSION INTRAVENOUS at 15:08

## 2022-01-13 RX ADMIN — Medication 40 MG: at 14:52

## 2022-01-13 RX ADMIN — PROPOFOL 40 MG: 10 INJECTION, EMULSION INTRAVENOUS at 15:02

## 2022-01-13 RX ADMIN — PROPOFOL 40 MG: 10 INJECTION, EMULSION INTRAVENOUS at 14:57

## 2022-01-13 RX ADMIN — PROPOFOL 50 MG: 10 INJECTION, EMULSION INTRAVENOUS at 14:51

## 2022-01-13 RX ADMIN — LIDOCAINE HYDROCHLORIDE 50 MG: 10 INJECTION, SOLUTION EPIDURAL; INFILTRATION; INTRACAUDAL at 14:49

## 2022-01-13 RX ADMIN — SODIUM CHLORIDE, SODIUM LACTATE, POTASSIUM CHLORIDE, AND CALCIUM CHLORIDE 125 ML/HR: .6; .31; .03; .02 INJECTION, SOLUTION INTRAVENOUS at 12:47

## 2022-01-13 RX ADMIN — PROPOFOL 20 MG: 10 INJECTION, EMULSION INTRAVENOUS at 15:04

## 2022-01-13 RX ADMIN — PROPOFOL 20 MG: 10 INJECTION, EMULSION INTRAVENOUS at 14:55

## 2022-01-13 NOTE — ANESTHESIA POSTPROCEDURE EVALUATION
Post-Op Assessment Note    CV Status:  Stable  Pain Score: 0    Pain management: adequate     Mental Status:  Sleepy   Hydration Status:  Euvolemic   PONV Controlled:  Controlled   Airway Patency:  Patent      Post Op Vitals Reviewed: Yes      Staff: Anesthesiologist         No complications documented      BP 96/51 (01/13/22 1513)    Temp 97 8 °F (36 6 °C) (01/13/22 1513)    Pulse 55 (01/13/22 1513)   Resp (!) 24 (01/13/22 1513)    SpO2 93 % (01/13/22 1513)

## 2022-01-13 NOTE — H&P
History and Physical - [facility]  Chilo Clark 61 y o  male MRN: [id number]    HPI: Chilo Clark is a 61y o  year old male who presents for evaluation of ulcerative colitis, has history of indefinite for dysplasia on the rectal biopsies  Review of Systems    Historical Information   Past Medical History:   Diagnosis Date    Colitis     Hypertension     Sleep apnea      Past Surgical History:   Procedure Laterality Date    CARDIAC CATHETERIZATION      COLONOSCOPY      [place] COLONOSCOPY FLX DX W/COLLJ SPEC WHEN PFRMD N/A 4/25/2016    Procedure: COLONOSCOPY;  Surgeon: Sam Morrison MD;  [facility]; Service: Gastroenterology    TN SIGMOIDOSCOPY FLX DX W/COLLJ SPEC BR/WA IF PFRMD N/A 12/4/2018    Procedure: Saul Esquivel;  Surgeon: Ofelia Covington MD;  [facility]; Service: Gastroenterology     Social History   Social History     Substance and Sexual Activity   Alcohol Use Yes    Comment: 2 per month     Social History     Substance and Sexual Activity   Drug Use No     Social History     Tobacco Use   Smoking Status Former Smoker   Smokeless Tobacco Never Used     Family History   Problem Relation Age of Onset    Diabetes Mother     Heart disease Father         Cardiac Disorder    Heart disease Brother         Cardiac Disorder       Meds/Allergies     (Not in a hospital admission)      No Known Allergies    Objective     /87   Pulse 62   Temp 97 9 °F (36 6 °C) (Temporal)   Resp 18   Ht 5' 7" (1 702 m)   Wt 92 5 kg (204 lb)   SpO2 100%   BMI 31 95 kg/m²       PHYSICAL EXAM    Gen: NAD  CV: RRR  CHEST: Clear  ABD: soft, NT/ND  EXT: no edema  Neuro: AAO      ASSESSMENT/PLAN:  This is a 61y o  year old male here for dysplasia surveillance, has history of ulcerative colitis  PLAN:   Procedure:  Colonoscopy

## 2022-01-13 NOTE — ANESTHESIA PREPROCEDURE EVALUATION
Procedure:  COLONOSCOPY    Relevant Problems   ANESTHESIA (within normal limits)   (-) History of anesthesia complications      CARDIO   (+) Essential hypertension   (-) Chest pain   (-) STONE (dyspnea on exertion)      GI/HEPATIC   (+) Rectal bleeding      MUSCULOSKELETAL   (+) Gout      PULMONARY   (+) JOSE (obstructive sleep apnea)   (-) Shortness of breath   (-) URI (upper respiratory infection)      Other   (+) Ulcerative pancolitis (HCC)        Physical Exam    Airway    Mallampati score: II  TM Distance: >3 FB  Neck ROM: full     Dental   No notable dental hx     Cardiovascular      Pulmonary      Other Findings        Anesthesia Plan  ASA Score- 2     Anesthesia Type- IV sedation with anesthesia with ASA Monitors  Additional Monitors:   Airway Plan:           Plan Factors-Exercise tolerance (METS): >4 METS  Chart reviewed  Existing labs reviewed  Induction- intravenous  Postoperative Plan-     Informed Consent- Anesthetic plan and risks discussed with patient  I personally reviewed this patient with the CRNA  Discussed and agreed on the Anesthesia Plan with the CRNA  Anushka Sy

## 2022-01-13 NOTE — DISCHARGE INSTRUCTIONS
Colonoscopy   WHAT YOU NEED TO KNOW:   A colonoscopy is a procedure to examine the inside of your colon (intestine) with a scope  Polyps or tissue growths may have been removed during your colonoscopy  It is normal to feel bloated and to have some abdominal discomfort  You should be passing gas  If you have hemorrhoids or you had polyps removed, you may have a small amount of bleeding  DISCHARGE INSTRUCTIONS:   Seek care immediately if:    You have sudden, severe abdominal pain   You have problems swallowing   You have a large amount of black, sticky bowel movements or blood in your bowel movements   You have sudden trouble breathing   You feel weak, lightheaded, or faint or your heart beats faster than normal for you  Contact your healthcare provider if:    You have a fever and chills   You have nausea or are vomiting   Your abdomen is bloated or feels full and hard   You have abdominal pain   You have black, sticky bowel movements or blood in your bowel movements   You have not had a bowel movement for 3 days after your procedure   You have rash or hives   You have questions or concerns about your procedure  Activity:    Do not lift, strain, or run for 24 hours after your procedure   Rest after your procedure  You have been given medicine to relax you  Do not drive or make important decisions until the day after your procedure  Return to your normal activity as directed   Relieve gas and discomfort from bloating by lying on your right side with a heating pad on your abdomen  You may need to take short walks to help the gas move out  Eat small meals until bloating is relieved  Follow up with your healthcare provider as directed: Write down your questions so you remember to ask them during your visits  If you take a blood thinner, please review the specific instructions from your endoscopist about when you should resume it   These can be found in the Recommendation and Your Medication list sections of this After Visit Summary  Ulcerative Colitis   WHAT YOU NEED TO KNOW:   What is ulcerative colitis? Ulcerative colitis is a chronic disease of the colon (large intestine)  Inflammation and ulcers form on the inner lining of your colon  Ulcerative colitis is a type of inflammatory bowel disease  It usually starts between the ages of 13and 27years old  What are the signs and symptoms of ulcerative colitis? · Diarrhea that may have mucus or blood    · Bleeding from your rectum    · Urgent bowel movements    · Abdominal tenderness and bloating    · Fever, poor appetite, weight loss, fatigue    How is ulcerative colitis diagnosed? · A rectal exam  may show blood  Your healthcare provider will put a gloved finger inside your rectum  He or she will feel for inflammation or blockage  · A CT or MRI  may show a blockage, an abscess, inflammation, or abnormal connection  You may be given contrast liquid to help your colon show up better in the pictures  Tell the healthcare provider if you have ever had an allergic reaction to contrast liquid  Do not enter the MRI room with anything metal  Metal can cause serious injury  Tell the healthcare provider if you have any metal in or on your body  · Endoscopy  is a procedure used to find the cause of your ulcerative colitis  An endoscope is a bendable tube with a light and camera on the end  A small sample of tissue and bowel movement may be removed  These are sent to a lab for testing  How is ulcerative colitis treated? · Medicines  may be given to help decrease inflammation or control your immune system  You may need to take more than 1 medicine to treat your ulcerative colitis  · Surgery  may be needed to remove part or all of your colon  Ask about the different kinds of surgery that can be done to help your symptoms  How can I manage my ulcerative colitis?    · Do not take NSAID medicines , including aspirin and ibuprofen  NSAIDs can cause flare-ups  · Eat a variety of healthy foods  to keep your colon healthy  Healthy foods include fruit, vegetables, whole-grain breads, low-fat dairy products, beans, lean meat, and fish  Do not eat foods that make your symptoms worse  Your healthcare provider may give you vitamins or minerals to improve your nutrition if you have severe ulcerative colitis  · Drink liquids as directed  Ask how much liquid to drink each day and which liquids are best for you  For most people, water, juice, and milk are good choices  Do not drink alcohol  This can make your symptoms worse  · Exercise regularly  Ask about the best exercise plan for you  Any activity is better than none  Even 10 minutes a few times a day would help prevent constipation and help keep your colon healthy  · Manage stress  Stress may slow healing and cause illness  Learn new ways to relax, such as deep breathing  Call, or have someone call, your local emergency number (911 in the [address] [facility]) if:   · You have sudden trouble breathing  · You have a fast heart rate, fast breathing, or are too dizzy to stand up  When should I seek immediate care? · You have severe abdominal pain  · Your vomit has blood in it or looks like coffee grounds  · You see blood in your bowel movement  When should I call my doctor? · You have a fever, chills, a cough, or feel weak and achy  · You have abdominal pain that does not go away or gets worse after you take medicine  · Your abdomen is swollen  · You lose weight without trying  · You have questions or concerns about your condition or care  CARE AGREEMENT:   You have the right to help plan your care  Learn about your health condition and how it may be treated  Discuss treatment options with your healthcare providers to decide what care you want to receive  You always have the right to refuse treatment   The above information is an  only  It is not intended as medical advice for individual conditions or treatments  Talk to your doctor, nurse or pharmacist before following any medical regimen to see if it is safe and effective for you  © Copyright [address] 2021 Information is for End User's use only and may not be sold, redistributed or otherwise used for commercial purposes  All illustrations and images included in CareNotes® are the copyrighted property of A D A M , Inc  or [facility] Juanjose Araujo       Colorectal Polyps   WHAT YOU NEED TO KNOW:   What are colorectal polyps? Colorectal polyps are small growths of tissue in the lining of the colon and rectum  Most polyps are usually benign (not cancer)  Certain types of polyps, called adenomatous polyps, may turn into cancer  What increases my risk for colorectal polyps? The exact cause of colorectal polyps is unknown  The following may increase your risk:  · Older age    · Foods high in fat and low in fiber    · Family history of polyps    · Intestinal diseases, such as Crohn disease or ulcerative colitis    · Smoking cigarettes or drinking alcohol    · Lack of physical activity, such as exercise    · Obesity    What are the signs and symptoms of colorectal polyps? · Blood in your bowel movement or bleeding from the rectum    · Change in bowel movement habits, such as diarrhea or constipation    · Abdominal pain    What do I need to know about colorectal polyp screening and diagnosis? Screening means you are checked for polyps that may be cancer, even if you do not have signs or symptoms  Screening is recommended starting at age 48 and continuing to age 76 if you are at average risk  Your healthcare provider may suggest screening starting at age 39  Screening may start before you are 45 or continue after you are 75 if your risk is high  Your provider will tell you how often to get screened   Timing depends on the type of screening and if polyps are found  Timing also depends on your age and if you are at increased risk for cancer  Screening may be recommended every 1, 2, 5, or 10 years  Your healthcare provider will need to test polyps to find out if they are cancer  Any of the following may be used to find polyps:  · A digital rectal exam  means your provider will use a finger to check for polyps  · A barium enema  is an x-ray of the colon  A tube is put into your anus, and a liquid called barium is put through the tube  Barium is used so that healthcare providers can see your colon better on the x-ray film  · A virtual colonoscopy  is a CT scan that takes pictures of the inside of your colon and rectum  A small, flexible tube is put into your rectum and air or carbon dioxide (gas) is used to expand your colon  This lets healthcare providers clearly see your colon and any polyps on a monitor  · Colonoscopy or sigmoidoscopy  are procedures to help your provider see the inside of your colon  He or she will use a flexible tube with a small light and camera on the end  During a sigmoidoscopy, your provider will only look at rectum and lower colon  During a colonoscopy, he or she will look at the full length of your colon  A small amount of tissue may be removed from your colon for tests  How are colorectal polyps treated? Small, benign polyps may not need treatment  Your healthcare provider will check the polyp over time to make sure it is not changing  Polyps that are cancer may be removed with one of the following:  · A polypectomy  is a minimally invasive procedure to remove a polyp during a colonoscopy or sigmoidoscopy  Your healthcare provider may need to remove the polyp with a laparoscope  Laparoscopy is done by inserting a small, flexible scope into incisions made on your abdomen  · Surgery  may be needed to remove large or deep polyps that cannot be removed in a polypectomy   Tissues or lymph nodes near a polyp may also be removed  This helps stop cancer from spreading  What can I do to lower my risk for colorectal polyps? · Eat a variety of healthy foods  Healthy foods include fruit, vegetables, whole-grain breads, low-fat dairy products, beans, lean meat, and fish  Ask if you need to be on a special diet  · Maintain a healthy weight  Ask your healthcare provider what a healthy weight is for you  Ask him or her to help with a weight loss plan if you are overweight  · Exercise as directed  Begin to exercise slowly and do more as you get stronger  Talk with your healthcare provider before you start an exercise program          · Limit alcohol  Your risk for polyps increases the more you drink  · Do not smoke  Nicotine and other chemicals in cigarettes and cigars increase your risk for polyps  Ask your healthcare provider for information if you currently smoke and need help to quit  E-cigarettes or smokeless tobacco still contain nicotine  Talk to your healthcare provider before you use these products  Where can I find more information? · Kairssa Fernandez ([facility])  [address], [place] [address]  Phone: [phone]  Web Address: Claudio Ask  [facility] n[employer] go[employer]    Call your local emergency number (911 in the [address]) if:   · You have sudden shortness of breath  · You have a fast heart rate, fast breathing, or are too dizzy to stand up  When should I seek immediate care? · You have severe abdominal pain  · You see blood in your bowel movement  When should I call my doctor? · You have a fever  · You have chills, a cough, or feel weak and achy  · You have abdominal pain that does not go away or gets worse after you take medicine  · Your abdomen is swollen  · You are losing weight without trying  · You have questions or concerns about your condition or care  CARE AGREEMENT:   You have the right to help plan your care   Learn about your health condition and how it may be treated  Discuss treatment options with your healthcare providers to decide what care you want to receive  You always have the right to refuse treatment  The above information is an  only  It is not intended as medical advice for individual conditions or treatments  Talk to your doctor, nurse or pharmacist before following any medical regimen to see if it is safe and effective for you  © Copyright [employer] 2021 Information is for End User's use only and may not be sold, redistributed or otherwise used for commercial purposes   All illustrations and images included in CareNotes® are the copyrighted property of A D A M , Inc  or [address] [place] [address]

## 2022-01-27 ENCOUNTER — TELEPHONE (OUTPATIENT)
Dept: GASTROENTEROLOGY | Facility: CLINIC | Age: 57
End: 2022-01-27

## 2022-01-27 NOTE — TELEPHONE ENCOUNTER
lmom asking patient to contact the office to make an office apt with only Dr Bia Lutz as soon as able too

## 2022-01-28 ENCOUNTER — TELEPHONE (OUTPATIENT)
Dept: GASTROENTEROLOGY | Facility: AMBULARY SURGERY CENTER | Age: 57
End: 2022-01-28

## 2022-02-09 ENCOUNTER — TELEPHONE (OUTPATIENT)
Dept: GASTROENTEROLOGY | Facility: AMBULARY SURGERY CENTER | Age: 57
End: 2022-02-09

## 2022-02-09 NOTE — TELEPHONE ENCOUNTER
When doing pre charting saw not from [facility] that said make appointment with Dr Darvin Mensah only I could not see a note listing why but patient has appointment tomorrow at 8 am with [facility]  Please advise

## 2022-02-09 NOTE — TELEPHONE ENCOUNTER
That is ok  Maggie Guzman- you can call me later today to discuss this patient, he needs to be started on a biologic- so need to have a conversation with him regarding risks/benefits of biologics  He had mild persistent inflammation on colonoscopy       Thanks  Dr Martin Sarah

## 2022-02-10 ENCOUNTER — OFFICE VISIT (OUTPATIENT)
Dept: GASTROENTEROLOGY | Facility: AMBULARY SURGERY CENTER | Age: 57
End: 2022-02-10
Payer: COMMERCIAL

## 2022-02-10 VITALS
SYSTOLIC BLOOD PRESSURE: 122 MMHG | HEIGHT: 67 IN | WEIGHT: 217 LBS | BODY MASS INDEX: 34.06 KG/M2 | DIASTOLIC BLOOD PRESSURE: 78 MMHG

## 2022-02-10 DIAGNOSIS — K51.00 ULCERATIVE PANCOLITIS (HCC): Primary | ICD-10-CM

## 2022-02-10 PROCEDURE — 99214 OFFICE O/P EST MOD 30 MIN: CPT | Performed by: PHYSICIAN ASSISTANT

## 2022-02-10 PROCEDURE — 3008F BODY MASS INDEX DOCD: CPT | Performed by: PHYSICIAN ASSISTANT

## 2022-02-10 PROCEDURE — 1036F TOBACCO NON-USER: CPT | Performed by: PHYSICIAN ASSISTANT

## 2022-02-10 NOTE — PROGRESS NOTES
[facility] - Outpatient Follow-up Note  Gattis Fabry 61 y o  male MRN: [id number]  Encounter: [id number]          ASSESSMENT AND PLAN:      1  Ulcerative pancolitis   Previously maintained on Asacol however recent colonoscopy showed active inflammation within the sigmoid colon  Patient is asymptomatic     -office visit today spent discussing biologic agents due to persistent inflammation seen on colonoscopy     -discussed Remicade versus Humira     -discussed the risks of these medications including reactivation of tuberculosis, hepatitis-B, more prone to serious infections, hepatitis, anemia, increased risk for certain skin cancers     -also discussed infusion/injection site reactions, headaches, nausea   -discussed the importance of these medications due to persistent inflammation seen causing increased risk for progressive disease as well as malignancy if untreated      -patient expresses a preference for Humira  -will try to get Humira approved for this patient  Discussed sometimes insurance may dictate which biologic we can use 1st   -in the meantime, recommend patient continue Asacol     -patient obtained blood work including TB testing, hep B, TPMT enzymes, chronic hepatitis panel from [facility]   Results still pending   -no recent CBC has been obtained  Would recommend obtaining this now to assess baseline hemoglobin  Previous CBC was printed out for patient   -inflammatory markers within the last year with normal sed rate and mildly elevated CRP at 9 7     -discussed the need for yearly skin exams  -discussed the importance of keeping up-to-date with vaccines  -repeat colonoscopy due 01/2024        Patient will follow-up in 3 months   ______________________________________________________________________    SUBJECTIVE:      Gattis Fabry is a 61year old male with PMH of ulcerative colitis dx in 2018 who presents for a follow up   Patient recently underwent colonoscopy due to previous colonoscopy showing rectal biopsies indefinite for dysplasia  Colonoscopy 01/2022 with removal of polyps and mild colitis within the sigmoid colon  Biopsy showed chronic inflammation moderate activity  Polyps were precancerous, no evidence of dysplasia and repeat recommended in 2 years  Patient reports he is relatively feeling well  He has occasional bloating after eating however has bowel movement daily without any diarrhea  Denies any abdominal pain, decreased appetite or unintentional weight loss  Denies any nighttime symptoms or rectal urgency  He also denies any joint pains, rashes, fevers, chills or changes in vision  He reports that he was initially diagnosed with ulcerative colitis in 2018 he was having bright red blood per rectum  Has not had that in a while  Continues to take mesalamine as recommended  REVIEW OF SYSTEMS IS OTHERWISE NEGATIVE  Historical Information   Past Medical History:   Diagnosis Date    Colitis     Hypertension     Sleep apnea      Past Surgical History:   Procedure Laterality Date    CARDIAC CATHETERIZATION      COLONOSCOPY      OH COLONOSCOPY FLX DX W/COLLJ SPEC WHEN PFRMD N/A 4/25/2016    Procedure: COLONOSCOPY;  Surgeon: Benedict Brooke MD;  [facility]; Service: Gastroenterology    OH SIGMOIDOSCOPY FLX DX W/COLLJ SPEC BR/WA IF PFRMD N/A 12/4/2018    Procedure: Trevin Slaughter;  Surgeon: Beryle Homme, MD;  [facility];   Service: Gastroenterology     Social History   Social History     Substance and Sexual Activity   Alcohol Use Yes    Comment: 2 per month     Social History     Substance and Sexual Activity   Drug Use No     Social History     Tobacco Use   Smoking Status Former Smoker   Smokeless Tobacco Never Used     Family History   Problem Relation Age of Onset    Diabetes Mother     Heart disease Father         Cardiac Disorder    Heart disease Brother         Cardiac Disorder       Meds/Allergies Current Outpatient Medications:     losartan (COZAAR) 50 mg tablet    mesalamine (ASACOL) 800 MG EC tablet    No Known Allergies        Objective     Blood pressure 122/78, height 5' 7" (1 702 m), weight 98 4 kg (217 lb)  Body mass index is 33 99 kg/m²  PHYSICAL EXAM:      General Appearance:   Alert, cooperative, no distress   HEENT:   Normocephalic, atraumatic, anicteric      Neck:  Supple, symmetrical, trachea midline   Lungs:   Clear to auscultation bilaterally; no rales, rhonchi or wheezing; respirations unlabored    Heart[de-identified]   Regular rate and rhythm; no murmur, rub, or gallop  Abdomen:   Soft, non-tender, non-distended; normal bowel sounds; no masses, no organomegaly    Genitalia:   Deferred    Rectal:   Deferred    Extremities:  No cyanosis, clubbing or edema    Pulses:  2+ and symmetric    Skin:  No jaundice, rashes, or lesions    Lymph nodes:  No palpable cervical lymphadenopathy        Lab Results:   No visits with results within 1 Day(s) from this visit     Latest known visit with results is:   Hospital Outpatient Visit on 01/13/2022   Component Date Value    Case Report 01/13/2022                      Value:Surgical Pathology Report                         Case: [id number]                                   Authorizing Provider:  Jimmy Rivera MD       Collected:           01/13/2022 1459              Ordering Location:     [address] [place] [address]        Received:            01/13/2022 2057                                     [facility]                                                    Pathologist:           Meri Jones MD                                                                  Specimens:   A) - Large Intestine, Cecum                                                                         B) - Large Intestine, Right/Ascending Colon                                                         C) - Polyp, Colorectal, transverse colon, cold forceps D) - Large Intestine, Transverse Colon                                                              E) - Large Intestine, Left/Descending Colon                                                                                   F) - Polyp, Colorectal, descending colon x3, cold forceps                                           G) - Large Intestine, Sigmoid Colon                                                                 H) - Colon, recto-sigmoid colon                                                                     I) - Rectum                                                                                Final Diagnosis 01/13/2022                      Value: This result contains rich text formatting which cannot be displayed here   Additional Information 01/13/2022                      Value: This result contains rich text formatting which cannot be displayed here   Synoptic Checklist 01/13/2022                      Value:                            COLON/RECTUM POLYP FORM - GI - All Specimens                                                                                     :    Adenoma(s)      Gross Description 01/13/2022                      Value: This result contains rich text formatting which cannot be displayed here  Radiology Results:   Colonoscopy    Result Date: 1/13/2022  Narrative: Ayden Diaz [address] [place] [address] [address] [address] [phone] DATE OF SERVICE: 1/13/22 PHYSICIAN(S): Vasu Pettit MD - Attending Physician INDICATION: Abnormal rectal biopsy, [facility]cer[facility] Colonoscopy performed for a diagnostic indication  POST-OP DIAGNOSIS: See the impression below  HISTORY: Prior colonoscopy: 4 years ago  BOWEL PREPARATION: Golytely/Colyte/Trilyte PREPROCEDURE: Informed consent was obtained for the procedure, including sedation   Risks including but not limited to bleeding, infection, perforation, adverse drug reaction and aspiration were explained in detail  Also explained about less than 100% sensitivity with the exam and other alternatives  The patient was placed in the left lateral decubitus position  DETAILS OF PROCEDURE: Patient was taken to the procedure room where a time out was performed to confirm correct patient and correct procedure  The patient underwent monitored anesthesia care, which was administered by an anesthesia professional  The patient's blood pressure, heart rate, level of consciousness, oxygen and respirations were monitored throughout the procedure  A digital rectal exam was performed  The scope was introduced through the anus and advanced to the terminal ileum  Retroflexion was performed in the rectum  The quality of bowel preparation was evaluated using the [facility] Bowel Preparation Scale with scores of: right colon = 2, transverse colon = 2, left colon = 2  The total BBPS score was 6  Bowel prep was adequate  The patient experienced no blood loss  The procedure was not difficult  The patient tolerated the procedure well  There were no apparent complications  ANESTHESIA INFORMATION: ASA: II Anesthesia Type: IV Sedation with Anesthesia MEDICATIONS: simethicone (MYLICON) 40 mg in sterile water 60 mL 40 mg (Totals for administrations occurring from 1444 to 1510 on 01/13/22) FINDINGS: One 3 mm sessile, adenomatous-appearing polyp in the transverse colon; performed complete en bloc removal by cold forceps biopsy Mild, generalized cobblestone mucosa with loss of vascular pattern, consistent with ulcerative colitis in the sigmoid colon, 45 cm from the anal verge; performed cold forceps biopsy for dysplasia screening and to rule out colitis Pseudo polyps noted starting in the descending colon extending into the sigmoid   Three benign-appearing pseudopolyps measuring smaller than 5 mm in the descending colon; performed complete removal by cold forceps biopsy Performed multiple biopsies for dysplasia screening in the cecum, ascending colon, transverse colon, descending colon, sigmoid colon, rectosigmoid and rectum Small, internal hemorrhoids EVENTS: Procedure Events Event Event Time ENDO CECUM REACHED 1/13/2022  2:52 PM ENDO SCOPE OUT TIME 1/13/2022  3:09 PM SPECIMENS: ID Type Source Tests Collected by Time Destination 1 :  Tissue Large Intestine, Cecum TISSUE EXAM Beau Beasley MD 1/13/2022  2:54 PM  2 :  Tissue Large Intestine, Right/Ascending Colon TISSUE EXAM Beau Beasley MD 1/13/2022  2:57 PM  3 : transverse colon, cold forceps Tissue Polyp, Colorectal TISSUE EXAM Beau Beasley MD 1/13/2022  2:58 PM  4 :  Tissue Large Intestine, Transverse Colon TISSUE EXAM Beau Beasley MD 1/13/2022  2:59 PM  5 :  Tissue Large Intestine, Left/Descending Colon TISSUE EXAM Beau Beasley MD 1/13/2022  3:00 PM  6 : descending colon x3, cold forceps Tissue Polyp, Colorectal TISSUE EXAM Beau Beasley MD 1/13/2022  3:01 PM  7 :  Tissue Large Intestine, Sigmoid Colon TISSUE EXAM Beau Beasley MD 1/13/2022  3:04 PM  8 : recto-sigmoid colon Tissue Colon TISSUE EXAM Beau Beasley MD 1/13/2022  3:07 PM  9 :  Tissue Rectum TISSUE EXAM Beau Beasley MD 1/13/2022  3:08 PM  EQUIPMENT: Scope not documented     Impression: 1  See above  RECOMMENDATION: Await pathology results due to inflammatory bowel disease  Of note, patient has had history of indefinite for dysplasia and rectal biopsies previously  No rectal polyps noted on today's exam   NBI was used  There was mild colitis within the sigmoid colon spanning 7 cm a, biopsies were obtained, would recommend starting on a biologic therapy given mild symptoms despite use of mesalamine agent  Follow-up in office after biopsy results  Avoid NSAIDs   Beau Beasley MD

## 2022-02-11 LAB
HAV IGM SERPL QL IA: NORMAL
HBV CORE IGM SERPL QL IA: NORMAL
HBV SURFACE AB SER QL IA: NORMAL
HBV SURFACE AG SERPL QL IA: NORMAL
HCV AB S/CO SERPL IA: 0.05
HCV AB SERPL QL IA: NORMAL
M TB IFN-G CD4+ BCKGRND COR BLD-ACNC: 0 IU/ML
M TB IFN-G CD4+ BCKGRND COR BLD-ACNC: 0 IU/ML
M TB IFN-G CD4+ T-CELLS BLD-ACNC: 0.01 IU/ML
M TB TUBERC IFN-G BLD QL: NEGATIVE
M TB TUBERC IGNF/MITOGEN IGNF CONTROL: 8.35 IU/ML
TPMT RBC-CCNC: 17 NMOL/HR/ML RBC

## 2022-02-14 ENCOUNTER — TELEPHONE (OUTPATIENT)
Dept: GASTROENTEROLOGY | Facility: AMBULARY SURGERY CENTER | Age: 57
End: 2022-02-14

## 2022-02-16 NOTE — TELEPHONE ENCOUNTER
Submitted auth request through cover my meds  Approved Humira starter and maintenance through 5/11/2022  I called pharmacy and spoke with Lolis Partida  She stated she has to call their specialty pharmacy HR and get approval from them to fill  Advised to call if we need to assist in any way    PT aware

## 2022-02-18 ENCOUNTER — TELEPHONE (OUTPATIENT)
Dept: GASTROENTEROLOGY | Facility: CLINIC | Age: 57
End: 2022-02-18

## 2022-02-18 NOTE — TELEPHONE ENCOUNTER
Continue mesalamine for now  After 1 month of humira (2 loading doses) can decrease asacol to twice daily for 1 month, once daily for 1 month then stop   If symptoms worsen at all while weaning he should let us know

## 2022-02-18 NOTE — TELEPHONE ENCOUNTER
Called and spoke with patient  Went over directions and recommendations  Patient verbalized understanding  Advised to call if any symptoms worsen or any questions

## 2022-02-18 NOTE — TELEPHONE ENCOUNTER
Patient left message returning a call to you in regards to humira  Please return his call   Thank you

## 2022-02-18 NOTE — TELEPHONE ENCOUNTER
I called and spoke with patient  He wants to know if he should stop his Mesalamine right away or wait until after he starts Humira which he should be picking up tomorrow  Thank you!

## 2022-02-18 NOTE — TELEPHONE ENCOUNTER
Pharmacy left message that the humira you sent in was for pediatric  Please resend correct humira script   Thank you

## 2022-04-06 ENCOUNTER — TELEPHONE (OUTPATIENT)
Dept: GASTROENTEROLOGY | Facility: AMBULARY SURGERY CENTER | Age: 57
End: 2022-04-06

## 2022-04-06 DIAGNOSIS — K51.80 OTHER ULCERATIVE COLITIS WITHOUT COMPLICATION (HCC): ICD-10-CM

## 2022-04-06 NOTE — TELEPHONE ENCOUNTER
Received fax for Humira from [email or web address]  The authorization is expiring  Started auth through cover my meds   Key #[id number]

## 2022-04-06 NOTE — TELEPHONE ENCOUNTER
Received denial from [employer] for Humira  Can a letter of necessity be done? Please advise  Thank you!

## 2022-04-07 RX ORDER — MESALAMINE 800 MG/1
TABLET, DELAYED RELEASE ORAL
Qty: 180 TABLET | Refills: 5 | Status: SHIPPED | OUTPATIENT
Start: 2022-04-07

## 2022-04-11 NOTE — TELEPHONE ENCOUNTER
Insurance is requesting clinical benefit to medication  Pt does not have follow up until 5/11  Please advise  Thank you!

## 2022-04-22 NOTE — TELEPHONE ENCOUNTER
Please call patient and schedule for an appointment for ASAP and find out when he is available to speak with it sounds like rash developed after starting Humira and he needs to hold Humira until seen in office for further evaluation of rash  This may be a side effect of the Humira  To reach him but with sent to voicemail    Thank you I will also send this message to Dr Barbara Carlson

## 2022-04-22 NOTE — TELEPHONE ENCOUNTER
Pt has already started Humira  It was only approved until May and I got the authorization again for a year  I called him to tell him that his insurance approved it again and pt told me about the rash  Thank you!

## 2022-04-22 NOTE — TELEPHONE ENCOUNTER
I just spoke to patient  He is available for calls all afternoon    I offered him apt Wednesday next week for f/u with dr syed,  he declined    [facility] he would speak to you first    Many thanks    Robina Dudley

## 2022-04-22 NOTE — TELEPHONE ENCOUNTER
I spoke with Ramon Mariano is approved until 04/22/23  I called pt to make aware  Pt says he is breaking out from Humira, skin on arms are itchy, blotchy, and red in appearance  Pt says it looks almost like psoriasis  Please advise  Thank you!

## 2022-04-22 NOTE — TELEPHONE ENCOUNTER
So I am a little confused by the notes has a patient a ready started the Humira or not  If he ready started the Humira and then developed a rash he should hold off on the Humira until he seen by the physician or PA in the office  If he did not start the Humira then this may possibly be psoriasis and he may need further evaluation  Unfortunately I was unable to reach the patient so he calls back please obtain further information    Thank you

## 2022-04-22 NOTE — TELEPHONE ENCOUNTER
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 because the Benadryl can make you sleepy he does not want to use that medication  Patient is planned hydrocortisone on the rash now and he says it does seem to be helping  Please call patient back he will come in Wednesday to be seen by Dr Yang Locke  This does sound like its a reaction to the Humira and he was informed not to take any further doses of Humira until he sees Dr Yang Locke and she advises him further  Also told patient that if the rash gets worse he should reach out to the office before his appointment as he may need to be started on steroids for the rash  Please call patient and schedule follow-up appointment for Wednesday he is agreeable  Thank you

## 2022-04-22 NOTE — TELEPHONE ENCOUNTER
Patient needs to be seen in the office ASAP for the doctor can evaluate that rash he has itching blotchy and red areas on his arm he says it looks like psoriasis but needs to be further evaluated he should hold off on his Humira injections until he seen in the office may be a reaction from the Humira  I attempted to call patient but was sent to voicemail I did tell him to call the office back    Thank you

## 2022-04-25 NOTE — TELEPHONE ENCOUNTER
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

## 2022-04-25 NOTE — TELEPHONE ENCOUNTER
Thank you,   Santo Ram- can you check on him today to see how he is doing, has the rash improved or worsened?

## 2022-04-27 ENCOUNTER — OFFICE VISIT (OUTPATIENT)
Dept: GASTROENTEROLOGY | Facility: AMBULARY SURGERY CENTER | Age: 57
End: 2022-04-27
Payer: COMMERCIAL

## 2022-04-27 ENCOUNTER — TELEPHONE (OUTPATIENT)
Dept: DERMATOLOGY | Age: 57
End: 2022-04-27

## 2022-04-27 VITALS
WEIGHT: 215.8 LBS | DIASTOLIC BLOOD PRESSURE: 80 MMHG | SYSTOLIC BLOOD PRESSURE: 136 MMHG | HEIGHT: 67 IN | HEART RATE: 71 BPM | BODY MASS INDEX: 33.87 KG/M2 | OXYGEN SATURATION: 100 %

## 2022-04-27 DIAGNOSIS — K51.019 ULCERATIVE PANCOLITIS WITH COMPLICATION (HCC): Primary | ICD-10-CM

## 2022-04-27 DIAGNOSIS — R21 RASH IN ADULT: ICD-10-CM

## 2022-04-27 PROCEDURE — 99214 OFFICE O/P EST MOD 30 MIN: CPT | Performed by: INTERNAL MEDICINE

## 2022-04-27 NOTE — TELEPHONE ENCOUNTER
Called pt to schedule an appt via referral  Pt is experiencing a rash from Humira   Trying to schedule ASAP

## 2022-04-27 NOTE — PROGRESS NOTES
[facility]  Progress Note - Jeovany Quiñones 61 y o  male MRN: [id number]    Unit/Bed#:  Encounter: [id number]    Assessment/Plan:    1  Ulcerative pan colitis-recent colonoscopy with active inflammation noted endoscopically and on biopsies within the sigmoid colon  Random biopsies without any dysplastic changes  Patient did have adenomatous polyp as well  Following colonoscopy and biopsies, patient was started on Humira  Unfortunately, patient has developed a rash after 1 month after induction dosing  Appears to be  psoraitic type of rash on exam involving forearms and lower extremities  -at this time I have recommended an urgent referral to Dermatology, and to stop the use of humira going forward    -can continue with topical hydrocortisone cream   -will check inflammatory markers, CBC and CMP as well  Addendum:  Patient has an appointment with Dermatology tomorrow afternoon at 2 30 pm       Subjective: This is a 61year old male with history of ulcerative colitis, with active colitis in the sigmoid colon who presents for follow-up  Patient was previously on Asacol 4 8 g daily  Given persistent active inflammation noted endoscopically and moderately inflamed sigmoid colon on pathology, patient was ultimately started on Humira  Patient reports that after induction doses he started developing rash on his extremities 1 week ago  He is noted to have psoriatic appearing rash in his a forearms and on his lower legs  No rash in the back  No rash in the face  No fever  No other sick contacts  No change in detergent, no other new medications  He reports that his bowel movements were previously normal however since Humira he is having occasional episode of urgency followed by a bowel movement  He reports that this never happened before  He denies any blood in the stool or any abdominal pain otherwise    He reports that he did have chronic left-sided abdominal pain in the past which he thought was musculoskeletal however this appeared to be improved since starting Humira  Objective:     Vitals: Blood pressure 136/80, pulse 71, height 5' 7" (1 702 m), weight 97 9 kg (215 lb 12 8 oz), SpO2 100 %  ,Body mass index is 33 8 kg/m²  [unfilled]    Physical Exam:    GEN: wn/wd, NAD  HEENT: MMM, no cervical or supraclavicular LAD, anciteric  CV: RRR, no m/r/g  CHEST: CTA b/l, no w/r/r  ABD: +BS, soft, NT/ND, no hepatosplenomegaly  EXT: no c/c/e  SKIN: no rashes  NEURO: aaox3      Invasive Devices  Report    Peripheral Intravenous Line            Long-Dwell Peripheral IV (Midline) 51/10/72 Left Basilic 3542 days                        Lab, Imaging and other studies:     No visits with results within 1 Day(s) from this visit  Latest known visit with results is:   Orders Only on 02/03/2022   Component Date Value    Hep A Ab, IgM 02/03/2022 NON-REACTIVE     HBsAg Screen 02/03/2022 NON-REACTIVE     Hep B Core Ab, IgM 02/03/2022 NON-REACTIVE     HEP C AB 02/03/2022 NON-REACTIVE     Signal to Cut-Off 02/03/2022 0 05     REFLEX TIQ 02/03/2022      TPMT ACTIVITY 02/03/2022 17     Hepatitis B Surface Anti* 02/03/2022 NON-REACTIVE     [facility] Quantiferon(R)-TB * 02/03/2022 NEGATIVE     NIL 02/03/2022 0 01     Mitogen-NIL 02/03/2022 8 35     TB1-NIL 02/03/2022 0 00     TB2-NIL 02/03/2022 0 00          I have personally reviewed pertinent films in PACS    No current facility-administered medications for this visit

## 2022-04-27 NOTE — LETTER
April 29, 2022     Referral [address] [place] [id number]    Patient: Leann Amezcua   YOB: 1965   Date of Visit: 4/27/2022       Dear Dr William Vega:    Thank you for referring Dary Benjamin to me for evaluation  Below are my notes for this consultation  If you have questions, please do not hesitate to call me  I look forward to following your patient along with you  Sincerely,        Lamberto Moreno MD        CC: MD Lamberot Alarcon MD  4/27/2022  7:55 PM  Sign when Signing Visit  [facility]  Progress Note - Leann Amezcua 61 y o  male MRN: [id number]    Unit/Bed#:  Encounter: [id number]    Assessment/Plan:    1  Ulcerative pan colitis-recent colonoscopy with active inflammation noted endoscopically and on biopsies within the sigmoid colon  Random biopsies without any dysplastic changes  Patient did have adenomatous polyp as well  Following colonoscopy and biopsies, patient was started on Humira  Unfortunately, patient has developed a rash after 1 month after induction dosing  Appears to be  psoraitic type of rash on exam involving forearms and lower extremities  -at this time I have recommended an urgent referral to Dermatology, and to stop the use of humira going forward    -can continue with topical hydrocortisone cream   -will check inflammatory markers, CBC and CMP as well  Addendum:  Patient has an appointment with Dermatology tomorrow afternoon at 2 30 pm       Subjective: This is a 61year old male with history of ulcerative colitis, with active colitis in the sigmoid colon who presents for follow-up  Patient was previously on Asacol 4 8 g daily  Given persistent active inflammation noted endoscopically and moderately inflamed sigmoid colon on pathology, patient was ultimately started on Humira  Patient reports that after induction doses he started developing rash on his extremities 1 week ago    He is noted to have psoriatic appearing rash in his a forearms and on his lower legs  No rash in the back  No rash in the face  No fever  No other sick contacts  No change in detergent, no other new medications  He reports that his bowel movements were previously normal however since Humira he is having occasional episode of urgency followed by a bowel movement  He reports that this never happened before  He denies any blood in the stool or any abdominal pain otherwise  He reports that he did have chronic left-sided abdominal pain in the past which he thought was musculoskeletal however this appeared to be improved since starting Humira  Objective:     Vitals: Blood pressure 136/80, pulse 71, height 5' 7" (1 702 m), weight 97 9 kg (215 lb 12 8 oz), SpO2 100 %  ,Body mass index is 33 8 kg/m²  [unfilled]    Physical Exam:    GEN: wn/wd, NAD  HEENT: MMM, no cervical or supraclavicular LAD, anciteric  CV: RRR, no m/r/g  CHEST: CTA b/l, no w/r/r  ABD: +BS, soft, NT/ND, no hepatosplenomegaly  EXT: no c/c/e  SKIN: no rashes  NEURO: aaox3      Invasive Devices  Report    Peripheral Intravenous Line            Long-Dwell Peripheral IV (Midline) 38/94/39 Left Basilic 4160 days                        Lab, Imaging and other studies:     No visits with results within 1 Day(s) from this visit     Latest known visit with results is:   Orders Only on 02/03/2022   Component Date Value    Hep A Ab, IgM 02/03/2022 NON-REACTIVE     HBsAg Screen 02/03/2022 NON-REACTIVE     Hep B Core Ab, IgM 02/03/2022 NON-REACTIVE     HEP C AB 02/03/2022 NON-REACTIVE     Signal to Cut-Off 02/03/2022 0 05     REFLEX TIQ 02/03/2022      TPMT ACTIVITY 02/03/2022 17     Hepatitis B Surface Anti* 02/03/2022 NON-REACTIVE     [facility] Quantiferon(R)-TB * 02/03/2022 NEGATIVE     NIL 02/03/2022 0 01     Mitogen-NIL 02/03/2022 8 35     TB1-NIL 02/03/2022 0 00     TB2-NIL 02/03/2022 0 00          I have personally reviewed pertinent films in PACS    No current facility-administered medications for this visit

## 2022-04-28 ENCOUNTER — CONSULT (OUTPATIENT)
Dept: DERMATOLOGY | Facility: CLINIC | Age: 57
End: 2022-04-28
Payer: COMMERCIAL

## 2022-04-28 VITALS — BODY MASS INDEX: 33.74 KG/M2 | HEIGHT: 67 IN | WEIGHT: 215 LBS | TEMPERATURE: 97.6 F

## 2022-04-28 DIAGNOSIS — L30.9 DERMATITIS: Primary | ICD-10-CM

## 2022-04-28 DIAGNOSIS — K51.019 ULCERATIVE PANCOLITIS WITH COMPLICATION (HCC): ICD-10-CM

## 2022-04-28 PROCEDURE — 99203 OFFICE O/P NEW LOW 30 MIN: CPT | Performed by: DERMATOLOGY

## 2022-04-28 NOTE — PATIENT INSTRUCTIONS
ATOPIC DERMATITIS     Assessment and Plan:  Based on a thorough discussion of this condition and the management approach to it (including a comprehensive discussion of the known risks, side effects and potential benefits of treatment), the patient (family) agrees to implement the following specific plan:   Apply Triamcinolone 0 1% ointment 2-4 times daily to affected areas on skin  up to 2 weeks       Assessment and Plan:   Atopic Dermatitis is a chronic, itchy skin condition that is very common in children but may occur at any age  It is also known as eczema or atopic eczema   It is the most common form of dermatitis  Atopic dermatitis usually occurs in people who have an atopic tendency    This means they may develop any or all of these closely linked conditions: Atopic dermatitis, asthma, hay fever (allergic rhinitis), eosinophilic esophagitis, and gastroenteritis  Often these conditions run within families with a parent, child or sibling also affected  A family history of asthma, eczema or hay fever is particularly useful in diagnosing atopic dermatitis in infants  Atopic dermatitis arises because of a complex interaction of genetic and environmental factors  These include defects in skin barrier function making the skin more susceptible to irritation by soap and other contact irritants, the weather, temperature and non-specific triggers  There is also an element of immune system dysregulation that is often present  By definition, it is chronic and has a "waxing-waning" nature; flares should be expected but with good education and treatment strategies can be minimized      Your treatment plan   Using only mild cleansers (hypoallergenic and without fragrances) and fragrance free detergent (not unscented products which contain a masking agent)   Apply moisturizer to entire body at least 2 times a day   Use the above listed medication to affected areas twice a day for a full 2 days after the rash has cleared   Take on over the counter antihistamine like diphenhydramine before bed if the itching is keeping you awake    ATOPIC DERMATITIS FAQS    WHAT IS ATOPIC DERMATITIS? Atopic dermatitis (also called eczema) results from a weak skin barrier and an over active immune system  The weak skin barrier allows things to get into the skin and then the immune system has an intense reaction to this substances  The result is itchy red patches anywhere on the body  WHO GETS ATOPIC DERMATITIS? There is no single answer because many factors are involved  It is likely a combination of genetic makeup and environmental triggers and/or exposures  People with atopic dermatitis are prone to other types of "atopy"  They are at increased risk for food allergies, Asthma and hay fever and there is often a family history of these problems as well  WHAT ABOUT FOOD ALLERGIES? This is a very controversial topic, as many believe that food allergies are responsible for skin flares  In some cases, specific foods may cause worsening of atopic dermatitis; however this occurs in a minority of cases and usually happens within a few hours of ingestion  While food allergy is more common in children with eczema, foods are specific triggers for flares in only a small percentage of children  If you notice that the skin flares after certain foods you can see if eliminating one food at time makes a difference, as long as your child can still enjoy a well-balanced diet  There are blood (RAST) and skin (PRICK) tests that can check for allergies, but they are often positive in children who are not truly allergic  Therefore it is important that you work with your allergist and dermatologist to determine which foods are relevant and causing true symptoms    Extreme food elimination diets without the guidance of your doctor, which have become more popular in recent years, may even result in worsening of the skin rash due to malnutrition and avoidance of essential nutrients  WHY SO MUCH MOISTURIZER? This is the 1st step and most important part of treatment  This helps maintaint the skin's barrier function and prevent flares  Creams and ointments are preferable over lotions  Aveeno eczema relief and Eucerin eczema relief  and cerave are all good ones to start with  It is best to put  moisturizer on directly after bathing, while the skin is damp, it is twice as effective then  You may bathe daily  Use warm - not hot - water, for short periods of time (5-10 minutes at a time) Dry off by Lightly patting the skin with a towel and not rubbing  While the skin is still damp, (within 3 minutes) apply any medications to the rashy areas 1st and then moisturize the entire body  It's best to apply the medication 1st but if the medications sting, moisturizer can be applied 1st     WHY USE THE MEDICATION FOR AN EXTRA 2 DAYS AFTER THE RASH IS GONE? Sometimes the rash may appear to be gone, but there are still microscopic changes in the skin  Using the medication and extra 2 days will ensure the inflammation has resolve and make the rash less likely to return quickly  WHAT ARE TRIGGERS?   Occasionally there are specific things that trigger itching and rashes, but in many cases may none can be identified  The most common triggers are:   Heat and sweat for some individuals, cold weather for others   House dust mites, pet fur   Wool; synthetic fabrics like nylon; dyed fabrics   Tobacco smoke    Fragrances in: shampoos, soaps, lotions, laundry detergents, fabric softeners   Saliva or prolonged exposure to water  start with these  Avoid the use of fabric softeners in the washing machine or dryer sheets (unless they are fragrance-free)  Try to use laundry detergents, soaps and shampoos that are fragrance-free  You may find it helpful to double-rinse your clothes    Some children are sensitive to house dust mites and they may benefit from a plastic mattress wrap  While food allergy is more common in children with eczema, foods are specific triggers for flares in only a small percentage of children  If you notice that the skin flares after certain foods you can see if eliminating one food at time makes a difference, as long as your child can still enjoy a well-balanced diet  4) WHAT DOES THE ANTIHISTAMINE DO? Antihistamines help you not to scratch  Scratching only makes the skin more reactive and the barrier function even more disrupted  It can cause both children and their parents to lose sleep! There are different types of anti-itch medications  Some cause more drowsiness than others  Both types are acceptable depending on your child and your preference  Start with Benadryl and if that does not work, ask for a prescription antihistamine      5) ARE THERE ANY SIDE EFFECTS TO WORRY ABOUT? Corticosteroid creams and ointments (generally things with "-one" or "kash" on the end of their names): The strength of the cream or ointment depends on the name of the active ingredient  The numbers at the end do not indicate the relative strength  Thus triamcinolone 0 1% ointment, considered a mid-strength corticosteroid, is much stronger than hydrocortisone 1% even though the number following the name is much lower  Topical corticosteroids are very effective in treating atopic dermatitis  When used in the manner prescribed (to rashy areas of skin and for no more than a few weeks at a time to any one area) they are very safe  These are corticosteroids and are anti-inflammatory, not the anabolic steroids like those used illegally by some athletes  It is important that you do not overuse steroid creams, and if you notice a thin, shiny appearance to the skin or broken blood vessels, you should stop using the cream and consult your health care provider regarding possible overuse/overthinning of the skin    The face, armpits and groin have particularly thin and sensitive skin and are therefore most at risk for bad results if steroids are over-used in these sites  Topical non-steroid creams and ointments (immunomodulators): These creams and ointments are also called topical calcineurin inhibitors (TCIs)  These include Protopic ointment and Elidel cream  Crisaborole 2% Xu Packer) is a prescription ointment that targets an enzyme called PDE4 (phosphodiesterase 4)  It is used on the skin topically to treat mild-to-moderate eczema in adults and children 3years of age and older  In total, these nonsteroidal prescriptions are used to help decrease itching and redness in the skin  They are not as strong as most steroid creams; however, it is believed that they do not thin the skin when overused  They are generally used as second-line medications, though they may be used alone or in conjunction with topical steroids  In sensitive areas such as the face, underarms or groin, they are often recommended  They can sting inflamed skin, but are generally well tolerated once the skin is healing  The FDA placed a black-box warning on both Elidel and Protopic in 2006 based on animal studies using the medications  Some animals developed skin cancer and lymphoma  Subsequently, the FDA released a statement that there is no causal relationship between the two medications and cancer  Because of this concern, there are ongoing studies to evaluate this relationship in humans  So far, there are studies that support the safety of these medications  One showed that the rates of cancer in patient using these medications topically were less than the rates of the general population and another showed that in patient's using the medication over a large area of the body, the levels of the medication in the blood was undetectable      As for Eucrisa, this product is only approved for the topical treatment of mild-to-moderate eczema in patients 3years of age and older; use of the medication in kids younger than 2 is considered off label and has not been formally studied  Burning and stinging are the most commonly reported side effects of this medication  Rarely, this product has been known to cause hives and hypersensitivity reactions; discontinue its use if you develop severe itching, swelling, or redness in the area of application

## 2022-04-28 NOTE — PROGRESS NOTES
[facility] Note     Patient Name: Ric Aguilar  Encounter Date: 04/28/22     Have you been cared for by a [facility] Dermatologist in the last 3 years and, if so, which one? No    · Have you traveled outside of the [address] [place] [address] in the past 3 months or outside of the [facility] area in the last 2 weeks? No     May we call your Preferred Phone number to discuss your specific medical information? Yes     May we leave a detailed message that includes your specific medical information? Yes      Today's Chief Concerns:   Concern #1:  redish dry spots on skin      Past Medical History:  Have you personally ever had or currently have any of the following? · Skin cancer (such as Melanoma, Basal Cell Carcinoma, Squamous Cell Carcinoma? (If Yes, please provide more detail)- No  · Eczema: No  · Psoriasis: No  · HIV/AIDS: No  · Hepatitis B or C: No  · Tuberculosis: No  · Systemic Immunosuppression such as Diabetes, Biologic or Immunotherapy, Chemotherapy, Organ Transplantation, Bone Marrow Transplantation (If YES, please provide more detail): No  · Radiation Treatment (If YES, please provide more detail): No  · Any other major medical conditions/concerns? (If Yes, which types)- No    Social History:     What is/was your primary occupation? [employer]     What are your hobbies/past-times? Family History:  Have any of your "first degree relatives" (parent, brother, sister, or child) had any of the following       · Skin cancer such as Melanoma or Merkel Cell Carcinoma or Pancreatic Cancer? No  · Eczema, Asthma, Hay Fever or Seasonal Allergies: YES, half sister has hx of eczema  · Psoriasis or Psoriatic Arthritis: No  · Do any other medical conditions seem to run in your family? If Yes, what condition and which relatives?   YES, mother has hx of diabetes    Current Medications:         Current Outpatient Medications:     losartan (COZAAR) 50 mg tablet, TAKE 1 TABLET BY MOUTH EVERY DAY AS DIRECTED, Disp: 30 tablet, Rfl: 5    mesalamine (ASACOL) 800 MG EC tablet, TAKE TWO TABLETS BY MOUTH THREE TIMES A DAY, Disp: 180 tablet, Rfl: 5      Review of Systems:  Have you recently had or currently have any of the following? If YES, what are you doing for the problem? · Fever, chills or unintended weight loss: No  · Sudden loss or change in your vision: No  · Nausea, vomiting or blood in your stool: No  · Painful or swollen joints: No  · Wheezing or cough: No  · Changing mole or non-healing wound: YES, spot on left leg and left upper arm  · Nosebleeds: No  · Excessive sweating: No  · Easy or prolonged bleeding? No  · Over the last 2 weeks, how often have you been bothered by the following problems? · Taking little interest or pleasure in doing things: 1 - Not at All  · Feeling down, depressed, or hopeless: 1 - Not at All  · Rapid heartbeat with epinephrine:  No    · Any known allergies? · No Known Allergies      Physical Exam:     Was a chaperone (Derm Clinical Assistant) present throughout the entire Physical Exam? Yes     Did the Dermatology Team specifically  the patient on the importance of a Full Skin Exam to be sure that nothing is missed clinically?  Yes}  o Did the patient ultimately request or accept a Full Skin Exam?  NO  o Did the patient specifically refuse to have the areas "under-the-bra" examined by the Dermatologist? No  o Did the patient specifically refuse to have the areas "under-the-underwear" examined by the Dermatologist? No    CONSTITUTIONAL:   Vitals:    04/28/22 1454   Temp: 97 6 °F (36 4 °C)   TempSrc: Temporal   Weight: 97 5 kg (215 lb)   Height: 5' 7" (1 702 m)   PSYCH: Normal mood and affect  EYES: Normal conjunctiva  ENT: Normal lips and oral mucosa  CARDIOVASCULAR: No edema  RESPIRATORY: Normal respirations  HEME/LYMPH/IMMUNO:  No regional lymphadenopathy except as noted below in "ASSESSMENT AND PLAN BY DIAGNOSIS"      Assessment and Plan by Diagnosis:    History of Present Condition:     Duration:  How long has this been an issue for you?    o  couple of weeks   Location Affected:  Where on the body is this affecting you?    o  arms, legs and chest   Quality:  Is there any bleeding, pain, itch, burning/irritation, or redness associated with the skin lesion? o  itches   Severity:  Describe any bleeding, pain, itch, burning/irritation, or redness on a scale of 1 to 10 (with 10 being the worst)  o  5   Timing:  Does this condition seem to be there pretty constantly or do you notice it more at specific times throughout the day? o constant   Context:  Have you ever noticed that this condition seems to be associated with specific activities you do?    o  denies   Modifying Factors:    o Anything that seems to make the condition worse?    -  showering  o What have you tried to do to make the condition better?    -  hydrocortisone cream   Associated Signs and Symptoms:  Does this skin lesion seem to be associated with any of the following:  o  SL AMB DERM SIGNS AND SYMPTOMS: Itching and Scratching       DERMATITIS (LIKELY SPONGIOTIC IN NATURE) POSSIBLY RELATED TO RECENT THERAPY WITH ADALIMUMAB, WHICH HAS BEEN DISCONTINUED    Physical Exam:   Anatomic Location Affected:  Arms,legs chest   Morphological Description:  See photo   Severity: mild   Pertinent Positives:   Pertinent Negatives:                     Additional History of Present Condition:  Patient was prescribed Humeria for ulcerative colitis; patient was taking 40 mg every 2 weeks but noticed spots breaking out and getting over time after starting the starter pack of Humeria    Assessment and Plan:  Based on a thorough discussion of this condition and the management approach to it (including a comprehensive discussion of the known risks, side effects and potential benefits of treatment), the patient (family) agrees to implement the following specific plan:   Apply triamcinolone 0 1% ointment 2-4 times daily to affected areas on skin up to 2 weeks     Assessment and Plan: This eruption is not consistent with the diagnosis of atopic dermatitis, but general recommendations are similar  Atopic Dermatitis is a chronic, itchy skin condition that is very common in children but may occur at any age  It is also known as eczema or atopic eczema   It is the most common form of dermatitis  Atopic dermatitis usually occurs in people who have an atopic tendency    This means they may develop any or all of these closely linked conditions: Atopic dermatitis, asthma, hay fever (allergic rhinitis), eosinophilic esophagitis, and gastroenteritis  Often these conditions run within families with a parent, child or sibling also affected  A family history of asthma, eczema or hay fever is particularly useful in diagnosing atopic dermatitis in infants  Atopic dermatitis arises because of a complex interaction of genetic and environmental factors  These include defects in skin barrier function making the skin more susceptible to irritation by soap and other contact irritants, the weather, temperature and non-specific triggers  There is also an element of immune system dysregulation that is often present  By definition, it is chronic and has a "waxing-waning" nature; flares should be expected but with good education and treatment strategies can be minimized  Your treatment plan   Using only mild cleansers (hypoallergenic and without fragrances) and fragrance free detergent (not unscented products which contain a masking agent)   Apply moisturizer to entire body at least 2 times a day   Use the above listed medication to affected areas twice a day for a full 2 days after the rash has cleared   Take on over the counter antihistamine like diphenhydramine before bed if the itching is keeping you awake    ATOPIC DERMATITIS FAQS    WHAT IS ATOPIC DERMATITIS?   Atopic dermatitis (also called [place]) results from a weak skin barrier and an over active immune system  The weak skin barrier allows things to get into the skin and then the immune system has an intense reaction to this substances  The result is itchy red patches anywhere on the body  WHO GETS ATOPIC DERMATITIS? There is no single answer because many factors are involved  It is likely a combination of genetic makeup and environmental triggers and/or exposures  People with atopic dermatitis are prone to other types of "atopy"  They are at increased risk for food allergies, Asthma and hay fever and there is often a family history of these problems as well  WHAT ABOUT FOOD ALLERGIES? This is a very controversial topic, as many believe that food allergies are responsible for skin flares  In some cases, specific foods may cause worsening of atopic dermatitis; however this occurs in a minority of cases and usually happens within a few hours of ingestion  While food allergy is more common in children with eczema, foods are specific triggers for flares in only a small percentage of children  If you notice that the skin flares after certain foods you can see if eliminating one food at time makes a difference, as long as your child can still enjoy a well-balanced diet  There are blood (RAST) and skin (PRICK) tests that can check for allergies, but they are often positive in children who are not truly allergic  Therefore it is important that you work with your allergist and dermatologist to determine which foods are relevant and causing true symptoms  Extreme food elimination diets without the guidance of your doctor, which have become more popular in recent years, may even result in worsening of the skin rash due to malnutrition and avoidance of essential nutrients  WHY SO MUCH MOISTURIZER? This is the 1st step and most important part of treatment    This helps maintaint the skin's barrier function and prevent flares  Creams and ointments are preferable over lotions  Aveeno eczema relief and Eucerin eczema relief  and cerave are all good ones to start with  It is best to put  moisturizer on directly after bathing, while the skin is damp, it is twice as effective then  You may bathe daily  Use warm - not hot - water, for short periods of time (5-10 minutes at a time) Dry off by Lightly patting the skin with a towel and not rubbing  While the skin is still damp, (within 3 minutes) apply any medications to the rashy areas 1st and then moisturize the entire body  It's best to apply the medication 1st but if the medications sting, moisturizer can be applied 1st     WHY USE THE MEDICATION FOR AN EXTRA 2 DAYS AFTER THE RASH IS GONE? Sometimes the rash may appear to be gone, but there are still microscopic changes in the skin  Using the medication and extra 2 days will ensure the inflammation has resolve and make the rash less likely to return quickly  WHAT ARE TRIGGERS?   Occasionally there are specific things that trigger itching and rashes, but in many cases may none can be identified  The most common triggers are:   Heat and sweat for some individuals, cold weather for others   House dust mites, pet fur   Wool; synthetic fabrics like nylon; dyed fabrics   Tobacco smoke    Fragrances in: shampoos, soaps, lotions, laundry detergents, fabric softeners   Saliva or prolonged exposure to water  start with these  Avoid the use of fabric softeners in the washing machine or dryer sheets (unless they are fragrance-free)  Try to use laundry detergents, soaps and shampoos that are fragrance-free  You may find it helpful to double-rinse your clothes  Some children are sensitive to house dust mites and they may benefit from a plastic mattress wrap  While food allergy is more common in children with eczema, foods are specific triggers for flares in only a small percentage of children    If you notice that the skin flares after certain foods you can see if eliminating one food at time makes a difference, as long as your child can still enjoy a well-balanced diet  4) WHAT DOES THE ANTIHISTAMINE DO? Antihistamines help you not to scratch  Scratching only makes the skin more reactive and the barrier function even more disrupted  It can cause both children and their parents to lose sleep! There are different types of anti-itch medications  Some cause more drowsiness than others  Both types are acceptable depending on your child and your preference  Start with Benadryl and if that does not work, ask for a prescription antihistamine      5) ARE THERE ANY SIDE EFFECTS TO WORRY ABOUT? Corticosteroid creams and ointments (generally things with "-one" or "kash" on the end of their names): The strength of the cream or ointment depends on the name of the active ingredient  The numbers at the end do not indicate the relative strength  Thus triamcinolone 0 1% ointment, considered a mid-strength corticosteroid, is much stronger than hydrocortisone 1% even though the number following the name is much lower  Topical corticosteroids are very effective in treating atopic dermatitis  When used in the manner prescribed (to rashy areas of skin and for no more than a few weeks at a time to any one area) they are very safe  These are corticosteroids and are anti-inflammatory, not the anabolic steroids like those used illegally by some athletes  It is important that you do not overuse steroid creams, and if you notice a thin, shiny appearance to the skin or broken blood vessels, you should stop using the cream and consult your health care provider regarding possible overuse/overthinning of the skin  The face, armpits and groin have particularly thin and sensitive skin and are therefore most at risk for bad results if steroids are over-used in these sites        Topical non-steroid creams and ointments (immunomodulators): These creams and ointments are also called topical calcineurin inhibitors (TCIs)  These include Protopic ointment and Elidel cream  Crisaborole 2% [place]) is a prescription ointment that targets an enzyme called PDE4 (phosphodiesterase 4)  It is used on the skin topically to treat mild-to-moderate eczema in adults and children 3years of age and older  In total, these nonsteroidal prescriptions are used to help decrease itching and redness in the skin  They are not as strong as most steroid creams; however, it is believed that they do not thin the skin when overused  They are generally used as second-line medications, though they may be used alone or in conjunction with topical steroids  In sensitive areas such as the face, underarms or groin, they are often recommended  They can sting inflamed skin, but are generally well tolerated once the skin is healing  The FDA placed a black-box warning on both Elidel and Protopic in 2006 based on animal studies using the medications  Some animals developed skin cancer and lymphoma  Subsequently, the FDA released a statement that there is no causal relationship between the two medications and cancer  Because of this concern, there are ongoing studies to evaluate this relationship in humans  So far, there are studies that support the safety of these medications  One showed that the rates of cancer in patient using these medications topically were less than the rates of the general population and another showed that in patient's using the medication over a large area of the body, the levels of the medication in the blood was undetectable  As for Eucrisa, this product is only approved for the topical treatment of mild-to-moderate eczema in patients 3years of age and older; use of the medication in kids younger than 2 is considered off label and has not been formally studied    Burning and stinging are the most commonly reported side effects of this medication  Rarely, this product has been known to cause hives and hypersensitivity reactions; discontinue its use if you develop severe itching, swelling, or redness in the area of application                      Scribe Attestation    I,:  Olena Pleitez am acting as a scribe while in the presence of the attending physician :       I,:  Linda Buck MD personally performed the services described in this documentation    as scribed in my presence :

## 2022-04-29 LAB
ALBUMIN SERPL-MCNC: 3.8 G/DL (ref 3.6–5.1)
ALBUMIN/GLOB SERPL: 1.2 (CALC) (ref 1–2.5)
ALP SERPL-CCNC: 97 U/L (ref 35–144)
ALT SERPL-CCNC: 31 U/L (ref 9–46)
AST SERPL-CCNC: 31 U/L (ref 10–35)
BASOPHILS # BLD AUTO: 69 CELLS/UL (ref 0–200)
BASOPHILS NFR BLD AUTO: 0.9 %
BILIRUB SERPL-MCNC: 0.3 MG/DL (ref 0.2–1.2)
BUN SERPL-MCNC: 16 MG/DL (ref 7–25)
BUN/CREAT SERPL: NORMAL (CALC) (ref 6–22)
CALCIUM SERPL-MCNC: 9.3 MG/DL (ref 8.6–10.3)
CHLORIDE SERPL-SCNC: 104 MMOL/L (ref 98–110)
CO2 SERPL-SCNC: 31 MMOL/L (ref 20–32)
CREAT SERPL-MCNC: 1.09 MG/DL (ref 0.7–1.33)
CRP SERPL-MCNC: 10.2 MG/L
EOSINOPHIL # BLD AUTO: 431 CELLS/UL (ref 15–500)
EOSINOPHIL NFR BLD AUTO: 5.6 %
ERYTHROCYTE [DISTWIDTH] IN BLOOD BY AUTOMATED COUNT: 12.5 % (ref 11–15)
ERYTHROCYTE [SEDIMENTATION RATE] IN BLOOD BY WESTERGREN METHOD: 11 MM/H
GLOBULIN SER CALC-MCNC: 3.2 G/DL (CALC) (ref 1.9–3.7)
GLUCOSE SERPL-MCNC: 87 MG/DL (ref 65–139)
HCT VFR BLD AUTO: 43.5 % (ref 38.5–50)
HGB BLD-MCNC: 15 G/DL (ref 13.2–17.1)
LYMPHOCYTES # BLD AUTO: 2780 CELLS/UL (ref 850–3900)
LYMPHOCYTES NFR BLD AUTO: 36.1 %
MCH RBC QN AUTO: 31 PG (ref 27–33)
MCHC RBC AUTO-ENTMCNC: 34.5 G/DL (ref 32–36)
MCV RBC AUTO: 89.9 FL (ref 80–100)
MONOCYTES # BLD AUTO: 1186 CELLS/UL (ref 200–950)
MONOCYTES NFR BLD AUTO: 15.4 %
NEUTROPHILS # BLD AUTO: 3234 CELLS/UL (ref 1500–7800)
NEUTROPHILS NFR BLD AUTO: 42 %
PLATELET # BLD AUTO: 359 THOUSAND/UL (ref 140–400)
PMV BLD REES-ECKER: 9.5 FL (ref 7.5–12.5)
POTASSIUM SERPL-SCNC: 4.5 MMOL/L (ref 3.5–5.3)
PROT SERPL-MCNC: 7 G/DL (ref 6.1–8.1)
RBC # BLD AUTO: 4.84 MILLION/UL (ref 4.2–5.8)
SL AMB EGFR AFRICAN AMERICAN: 86 ML/MIN/1.73M2
SL AMB EGFR NON AFRICAN AMERICAN: 74 ML/MIN/1.73M2
SODIUM SERPL-SCNC: 140 MMOL/L (ref 135–146)
WBC # BLD AUTO: 7.7 THOUSAND/UL (ref 3.8–10.8)

## 2022-06-08 DIAGNOSIS — I10 ESSENTIAL HYPERTENSION: ICD-10-CM

## 2022-06-08 RX ORDER — LOSARTAN POTASSIUM 50 MG/1
TABLET ORAL
Qty: 30 TABLET | Refills: 5 | Status: SHIPPED | OUTPATIENT
Start: 2022-06-08

## 2022-07-08 ENCOUNTER — OFFICE VISIT (OUTPATIENT)
Dept: GASTROENTEROLOGY | Facility: AMBULARY SURGERY CENTER | Age: 57
End: 2022-07-08
Payer: COMMERCIAL

## 2022-07-08 ENCOUNTER — TELEPHONE (OUTPATIENT)
Dept: GASTROENTEROLOGY | Facility: AMBULARY SURGERY CENTER | Age: 57
End: 2022-07-08

## 2022-07-08 VITALS
RESPIRATION RATE: 18 BRPM | SYSTOLIC BLOOD PRESSURE: 122 MMHG | WEIGHT: 210.6 LBS | HEIGHT: 67 IN | DIASTOLIC BLOOD PRESSURE: 70 MMHG | OXYGEN SATURATION: 100 % | BODY MASS INDEX: 33.06 KG/M2 | HEART RATE: 70 BPM

## 2022-07-08 DIAGNOSIS — D12.6 DYSPLASIA OF COLON: ICD-10-CM

## 2022-07-08 DIAGNOSIS — R10.13 DYSPEPSIA: Primary | ICD-10-CM

## 2022-07-08 DIAGNOSIS — K51.019 ULCERATIVE PANCOLITIS WITH COMPLICATION (HCC): ICD-10-CM

## 2022-07-08 PROCEDURE — 99214 OFFICE O/P EST MOD 30 MIN: CPT | Performed by: INTERNAL MEDICINE

## 2022-07-08 NOTE — TELEPHONE ENCOUNTER
Raul Points,  Dr Amber Bowman would like this patient started on Entyvio  The patient had an interaction with Humira in the form of a psoriatic rash  Please start process  Thank you!

## 2022-07-08 NOTE — PROGRESS NOTES
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  Random biopsies on most recent colonoscopy without any dysplastic changes  Currently on you on Asacol 4 8 g daily  Developed dermatitis after starting adalimumab  This was subsequently stopped with resolution of the dermatitis  -  Given active inflammation noted on colonoscopy despite mesalamine therapy, would recommend  Starting on vedolizumab  Patient is amenable to this  Went over the side effects of the medication  Patient has previously undergone testing for hepatitis a and gold QuantiFERON  - briefly discussed oral option with Isidro Duggan, however patient  Concerned with possible side effect of bradycardia  2   Dyspepsia- possibly non ulcer dyspepsia however would rule out gastritis/ peptic ulcer disease and H pylori infection  reports that he has had ongoing symptoms of postprandial abdominal bloating with occasional nausea  He reports that he takes Pepcid on as-needed basis which was recommended previously  He was also recommended endoscopic evaluation however patient did not undergo this  Symptoms have been ongoing for several years  No reports of dysphagia, melena, hematemesis, unintentional weight loss      -We went over non ulcerogenic diet  - we also discussed starting on Pepcid on a regular basis instead of as needed     -I discussed with patient regarding need for endoscopic evaluation given longstanding dyspepsia symptoms, patient is concerned that insurance may not cover it and will think about it  3   Indeterminate for dysplasia on rectal biopsies on colonoscopy in 2020, no changes noted on rectal biopsies in 2022  Will plan  On starting vedolizumab to minimize inflammation  Would recommend repeat colonoscopy in 2024      4  Adenomatous polyp- repeat colonoscopy in 2024  Subjective:     58-year-old male with history of UC, with active colitis in the sigmoid colon on the most recent colonoscopy, initially on Asacol, most recently was started on Humira which resulted in development of psoriatic appearing rash after 1 month of induction dosing  This was subsequently stopped  Patient presents today for follow-up  CBC, CMP were normal after the induction of Humira  While the sed rate was normal, CRP was mildly elevated  Objective:     Vitals: Blood pressure 122/70, pulse 70, resp  rate 18, height 5' 7" (1 702 m), weight 95 5 kg (210 lb 9 6 oz), SpO2 100 %  ,Body mass index is 32 98 kg/m²  [unfilled]    Physical Exam:    GEN: wn/wd, NAD  HEENT: MMM, no cervical or supraclavicular LAD, anciteric  CV: RRR, no m/r/g  CHEST: CTA b/l, no w/r/r  ABD: +BS, soft, NT/ND, no hepatosplenomegaly  EXT: no c/c/e  SKIN: no rashes  NEURO: aaox3      Invasive Devices  Report    Peripheral Intravenous Line  Duration           Long-Dwell Peripheral IV (Midline) 09/64/49 Left Basilic 3943 days                        Lab, Imaging and other studies:     No visits with results within 1 Day(s) from this visit     Latest known visit with results is:   Orders Only on 04/28/2022   Component Date Value    Glucose, Random 04/28/2022 87     BUN 04/28/2022 16     Creatinine 04/28/2022 1 09     eGFR Non  04/28/2022 74     eGFR  04/28/2022 86     SL AMB BUN/CREATININE RA* 12/94/0578 NOT APPLICABLE     Sodium 28/07/0403 140     Potassium 04/28/2022 4 5     Chloride 04/28/2022 104     CO2 04/28/2022 31     Calcium 04/28/2022 9 3     Protein, Total 04/28/2022 7 0     Albumin 04/28/2022 3 8     Globulin 04/28/2022 3 2     Albumin/Globulin Ratio 04/28/2022 1 2     TOTAL BILIRUBIN 04/28/2022 0 3     Alkaline Phosphatase 04/28/2022 97     AST 04/28/2022 31     ALT 04/28/2022 31     Sedimentation Rate 04/28/2022 11     White Blood Cell Count 04/28/2022 7 7     Red Blood Cell Count 04/28/2022 4 84     Hemoglobin 04/28/2022 15 0     HCT 04/28/2022 43 5     MCV 04/28/2022 89 9     MCH 04/28/2022 31 0     MCHC 04/28/2022 34 5     RDW 04/28/2022 12 5     Platelet Count 43/71/9727 359     SL AMB MPV 04/28/2022 9 5     Neutrophils (Absolute) 04/28/2022 3,234     Lymphocytes (Absolute) 04/28/2022 2,780     Monocytes (Absolute) 04/28/2022 1,186 (A)    Eosinophils (Absolute) 04/28/2022 431     Basophils ABS 04/28/2022 69     Neutrophils 04/28/2022 42     Lymphocytes 04/28/2022 36 1     Monocytes 04/28/2022 15 4     Eosinophils 04/28/2022 5 6     Basophils PCT 04/28/2022 0 9     C-Reactive Protein, Quant 04/28/2022 10 2 (A)         I have personally reviewed pertinent films in PACS    No current facility-administered medications for this visit

## 2022-07-08 NOTE — TELEPHONE ENCOUNTER
Can you please put the order in [place] I will start the Billing Inquiry now, the PA cant start till the order is there   ty

## 2022-07-08 NOTE — LETTER
July 8, 2022     Referral [address] [facility] [address]    Patient: Florence Curry   YOB: 1965   Date of Visit: 7/8/2022       Dear Dr Ethel Quinterof:    Thank you for referring Barbara Marshall to me for evaluation  Below are my notes for this consultation  If you have questions, please do not hesitate to call me  I look forward to following your patient along with you  Sincerely,        Selwyn Ball MD        CC: MD Selwyn Bueno MD  7/8/2022 11:31 AM  Sign when Signing Visit  [facility]  Progress Note - Florence Curry 61 y o  male MRN: [id number]    Unit/Bed#:  Encounter: [id number]    Assessment/Plan:    1   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  Random biopsies on most recent colonoscopy without any dysplastic changes  Currently on you on Asacol 4 8 g daily  Developed dermatitis after starting adalimumab  This was subsequently stopped with resolution of the dermatitis  -  Given active inflammation noted on colonoscopy despite mesalamine therapy, would recommend  Starting on vedolizumab  Patient is amenable to this  Went over the side effects of the medication  Patient has previously undergone testing for hepatitis a and gold QuantiFERON  - briefly discussed oral option with Dayton Garay, however patient  Concerned with possible side effect of bradycardia  2   Dyspepsia- possibly non ulcer dyspepsia however would rule out gastritis/ peptic ulcer disease and H pylori infection  reports that he has had ongoing symptoms of postprandial abdominal bloating with occasional nausea  He reports that he takes Pepcid on as-needed basis which was recommended previously  He was also recommended endoscopic evaluation however patient did not undergo this  Symptoms have been ongoing for several years    No reports of dysphagia, melena, hematemesis, unintentional weight loss      -We went over non ulcerogenic diet   - we also discussed starting on Pepcid on a regular basis instead of as needed     -I discussed with patient regarding need for endoscopic evaluation given longstanding dyspepsia symptoms, patient is concerned that insurance may not cover it and will think about it  3   Indeterminate for dysplasia on rectal biopsies on colonoscopy in 2020, no changes noted on rectal biopsies in 2022  Will plan  On starting vedolizumab to minimize inflammation  Would recommend repeat colonoscopy in 2024  4  Adenomatous polyp- repeat colonoscopy in 2024  Subjective:     59-year-old male with history of UC, with active colitis in the sigmoid colon on the most recent colonoscopy, initially on Asacol, most recently was started on Humira which resulted in development of psoriatic appearing rash after 1 month of induction dosing  This was subsequently stopped  Patient presents today for follow-up  CBC, CMP were normal after the induction of Humira  While the sed rate was normal, CRP was mildly elevated  Objective:     Vitals: Blood pressure 122/70, pulse 70, resp  rate 18, height 5' 7" (1 702 m), weight 95 5 kg (210 lb 9 6 oz), SpO2 100 %  ,Body mass index is 32 98 kg/m²  [unfilled]    Physical Exam:    GEN: wn/wd, NAD  HEENT: MMM, no cervical or supraclavicular LAD, anciteric  CV: RRR, no m/r/g  CHEST: CTA b/l, no w/r/r  ABD: +BS, soft, NT/ND, no hepatosplenomegaly  EXT: no c/c/e  SKIN: no rashes  NEURO: aaox3      Invasive Devices  Report    Peripheral Intravenous Line  Duration           Long-Dwell Peripheral IV (Midline) 25/20/42 Left Basilic 4644 days                        Lab, Imaging and other studies:     No visits with results within 1 Day(s) from this visit     Latest known visit with results is:   Orders Only on 04/28/2022   Component Date Value    Glucose, Random 04/28/2022 87     BUN 04/28/2022 16     Creatinine 04/28/2022 1 09     eGFR Non  04/28/2022 74     eGFR  04/28/2022 86     SL AMB BUN/CREATININE RA* 79/47/8476 NOT APPLICABLE     Sodium 40/43/5623 140     Potassium 04/28/2022 4 5     Chloride 04/28/2022 104     CO2 04/28/2022 31     Calcium 04/28/2022 9 3     Protein, Total 04/28/2022 7 0     Albumin 04/28/2022 3 8     Globulin 04/28/2022 3 2     Albumin/Globulin Ratio 04/28/2022 1 2     TOTAL BILIRUBIN 04/28/2022 0 3     Alkaline Phosphatase 04/28/2022 97     AST 04/28/2022 31     ALT 04/28/2022 31     Sedimentation Rate 04/28/2022 11     White Blood Cell Count 04/28/2022 7 7     Red Blood Cell Count 04/28/2022 4 84     Hemoglobin 04/28/2022 15 0     HCT 04/28/2022 43 5     MCV 04/28/2022 89 9     MCH 04/28/2022 31 0     MCHC 04/28/2022 34 5     RDW 04/28/2022 12 5     Platelet Count 67/50/7640 359     SL AMB MPV 04/28/2022 9 5     Neutrophils (Absolute) 04/28/2022 3,234     Lymphocytes (Absolute) 04/28/2022 2,780     Monocytes (Absolute) 04/28/2022 1,186 (A)    Eosinophils (Absolute) 04/28/2022 431     Basophils ABS 04/28/2022 69     Neutrophils 04/28/2022 42     Lymphocytes 04/28/2022 36 1     Monocytes 04/28/2022 15 4     Eosinophils 04/28/2022 5 6     Basophils PCT 04/28/2022 0 9     C-Reactive Protein, Quant 04/28/2022 10 2 (A)         I have personally reviewed pertinent films in PACS    No current facility-administered medications for this visit

## 2022-07-08 NOTE — PATIENT INSTRUCTIONS
Scheduled date of EGD(as of today):10/6/2022  Physician performing EGD:DR WEST  Location of EGD:[facility]  Instructions reviewed with patient by:MUNA TURNER  Clearances:

## 2022-07-10 DIAGNOSIS — K51.019 ULCERATIVE PANCOLITIS WITH COMPLICATION (HCC): Primary | ICD-10-CM

## 2022-07-10 RX ORDER — SODIUM CHLORIDE 9 MG/ML
20 INJECTION, SOLUTION INTRAVENOUS ONCE
Status: CANCELLED | OUTPATIENT
Start: 2022-07-18

## 2022-07-14 NOTE — TELEPHONE ENCOUNTER
Just realized you never saw this, it was sent to me directly  Do you want to call him before I send to [facility]?

## 2022-07-15 ENCOUNTER — TELEPHONE (OUTPATIENT)
Dept: OTHER | Facility: OTHER | Age: 57
End: 2022-07-15

## 2022-07-15 NOTE — TELEPHONE ENCOUNTER
Connected with the patient via phone and provided education on Entyvio and the authorization process  Patient is on board with Brady Gonzalez and I also mailed him education  Patient would like Jammie Bueno [facility]

## 2022-07-19 NOTE — TELEPHONE ENCOUNTER
Dwayne Aquino,   Can we try to get him approval for [facility] since he had allergic reactions to Humira      Thank you   Dr Amber Bowman

## 2022-07-22 NOTE — TELEPHONE ENCOUNTER
[facility] Fransico White advised they are requesting a Peer to Peer -- Claudia Alberto) Ph# [phone] to schedule P/P Call ref # [id number]      -Pending [facility] [place] # [id number]        Dr Janet Castillo will you do the peer to peer or have a PA do it? I can call to set it up just need to know what best time is and what number to have them call you

## 2022-07-27 NOTE — TELEPHONE ENCOUNTER
Unfortunately patient's plan does not cover Entyvio unless they have tried the following-  Humira which he has tried  But needs to try either Ernesto Thompson or Stelara    The other option would be in an attestation of why he cannot try any of these other options  Dr Radha Velazquez, please advise you would like to try any of these other options perhaps in another category like a JULES inhibitor or the Stelara or maybe the [address]  Thanks

## 2022-08-02 DIAGNOSIS — K51.019 ULCERATIVE PANCOLITIS WITH COMPLICATION (HCC): Primary | ICD-10-CM

## 2022-08-02 RX ORDER — SODIUM CHLORIDE 9 MG/ML
20 INJECTION, SOLUTION INTRAVENOUS ONCE
Status: CANCELLED | OUTPATIENT
Start: 2022-08-10

## 2022-08-02 NOTE — TELEPHONE ENCOUNTER
Called patient and left vm requesting a call back to go over another biologic since the first has been denied by his insurance

## 2022-08-02 NOTE — TELEPHONE ENCOUNTER
Pts stelara infusion approved 8/5-2/5/23 [id number], do you want me to call him and get sched and if he has questions about change in med I can have you call him?

## 2022-08-05 NOTE — TELEPHONE ENCOUNTER
Connected with the patient via phone  Provided education on Stelara and patient is on board with [facility] [place]  Scheduled patient for 8/10 at 230p with Genesis Hussein from [facility] infusion  Changed infusion start date  Mailed Stelara education information  Leigh: Patient should be good to go with Stelara infusion  If you could obtain auth approximately 2 weeks after initial infusion for Stelara pfs (pre-filled syringe), that would be great  Thank you!

## 2022-08-09 ENCOUNTER — TELEPHONE (OUTPATIENT)
Dept: INFUSION CENTER | Facility: CLINIC | Age: 57
End: 2022-08-09

## 2022-08-09 NOTE — TELEPHONE ENCOUNTER
I reached out to go over new patient information  Confirmed appointment date, time, location and current policies on visitors and masking  Pt was also advised on our attendance policy that requires a minimum of 24hrs notice prior to scheduled appnts for any cancellations  Requested return call with any questions

## 2022-08-10 ENCOUNTER — NURSE TRIAGE (OUTPATIENT)
Dept: OTHER | Facility: OTHER | Age: 57
End: 2022-08-10

## 2022-08-10 ENCOUNTER — HOSPITAL ENCOUNTER (OUTPATIENT)
Dept: INFUSION CENTER | Facility: CLINIC | Age: 57
Discharge: HOME | End: 2022-08-10
Payer: COMMERCIAL

## 2022-08-10 VITALS
DIASTOLIC BLOOD PRESSURE: 82 MMHG | HEART RATE: 73 BPM | OXYGEN SATURATION: 96 % | TEMPERATURE: 97.2 F | RESPIRATION RATE: 18 BRPM | SYSTOLIC BLOOD PRESSURE: 129 MMHG

## 2022-08-10 DIAGNOSIS — K51.019 ULCERATIVE PANCOLITIS WITH COMPLICATION (HCC): Primary | ICD-10-CM

## 2022-08-10 PROCEDURE — 96365 THER/PROPH/DIAG IV INF INIT: CPT

## 2022-08-10 RX ORDER — SODIUM CHLORIDE 9 MG/ML
20 INJECTION, SOLUTION INTRAVENOUS ONCE
Status: COMPLETED | OUTPATIENT
Start: 2022-08-10 | End: 2022-08-10

## 2022-08-10 RX ORDER — SODIUM CHLORIDE 9 MG/ML
20 INJECTION, SOLUTION INTRAVENOUS ONCE
Status: CANCELLED | OUTPATIENT
Start: 2022-08-10

## 2022-08-10 RX ADMIN — SODIUM CHLORIDE 20 ML/HR: 0.9 INJECTION, SOLUTION INTRAVENOUS at 14:46

## 2022-08-10 RX ADMIN — USTEKINUMAB 520 MG: 130 SOLUTION INTRAVENOUS at 15:09

## 2022-08-10 NOTE — PROGRESS NOTES
Patient is here for stelara  He offers no complaints at this time  Patient denies mario s/s of infection or being on antibiotics   Patient is negative for hepatitis and TB from labs on  2/3/22

## 2022-08-10 NOTE — PROGRESS NOTES
Patient tolerated his stelara without any adverse reactions   Next dr's appointment confirmed and he decline avs

## 2022-08-10 NOTE — TELEPHONE ENCOUNTER
Regarding: Medication question  ----- Message from Jessica Forrest sent at 8/10/2022  4:58 PM EDT -----  "I had an infusion of Stelara today,  but I am not sure if I am supposed to keep taking the mesalamine now "

## 2022-08-25 DIAGNOSIS — K51.019 ULCERATIVE PANCOLITIS WITH COMPLICATION (HCC): Primary | ICD-10-CM

## 2022-08-25 NOTE — TELEPHONE ENCOUNTER
lmovm stelara approved, can you please put RX in and send to [facility], I also signed the pt up for stelara nurse [facility]

## 2022-08-26 ENCOUNTER — TELEPHONE (OUTPATIENT)
Dept: GASTROENTEROLOGY | Facility: AMBULARY SURGERY CENTER | Age: 57
End: 2022-08-26

## 2022-09-29 ENCOUNTER — ANESTHESIA EVENT (OUTPATIENT)
Dept: ANESTHESIOLOGY | Facility: HOSPITAL | Age: 57
End: 2022-09-29

## 2022-09-29 ENCOUNTER — ANESTHESIA (OUTPATIENT)
Dept: ANESTHESIOLOGY | Facility: HOSPITAL | Age: 57
End: 2022-09-29

## 2022-10-06 ENCOUNTER — ANESTHESIA (OUTPATIENT)
Dept: GASTROENTEROLOGY | Facility: AMBULARY SURGERY CENTER | Age: 57
End: 2022-10-06

## 2022-10-06 ENCOUNTER — ANESTHESIA EVENT (OUTPATIENT)
Dept: GASTROENTEROLOGY | Facility: AMBULARY SURGERY CENTER | Age: 57
End: 2022-10-06

## 2022-10-06 ENCOUNTER — HOSPITAL ENCOUNTER (OUTPATIENT)
Dept: GASTROENTEROLOGY | Facility: AMBULARY SURGERY CENTER | Age: 57
Setting detail: OUTPATIENT SURGERY
End: 2022-10-06
Attending: INTERNAL MEDICINE
Payer: COMMERCIAL

## 2022-10-06 VITALS
TEMPERATURE: 96.6 F | HEIGHT: 67 IN | WEIGHT: 213 LBS | OXYGEN SATURATION: 96 % | HEART RATE: 58 BPM | BODY MASS INDEX: 33.43 KG/M2 | RESPIRATION RATE: 18 BRPM | SYSTOLIC BLOOD PRESSURE: 126 MMHG | DIASTOLIC BLOOD PRESSURE: 74 MMHG

## 2022-10-06 DIAGNOSIS — R10.13 DYSPEPSIA: ICD-10-CM

## 2022-10-06 DIAGNOSIS — K20.90 ESOPHAGITIS DETERMINED BY ENDOSCOPY: Primary | ICD-10-CM

## 2022-10-06 PROBLEM — E66.9 OBESITY: Status: ACTIVE | Noted: 2022-10-06

## 2022-10-06 PROCEDURE — 88342 IMHCHEM/IMCYTCHM 1ST ANTB: CPT | Performed by: PATHOLOGY

## 2022-10-06 PROCEDURE — 43239 EGD BIOPSY SINGLE/MULTIPLE: CPT | Performed by: INTERNAL MEDICINE

## 2022-10-06 PROCEDURE — 88305 TISSUE EXAM BY PATHOLOGIST: CPT | Performed by: PATHOLOGY

## 2022-10-06 RX ORDER — PROPOFOL 10 MG/ML
INJECTION, EMULSION INTRAVENOUS AS NEEDED
Status: DISCONTINUED | OUTPATIENT
Start: 2022-10-06 | End: 2022-10-06

## 2022-10-06 RX ORDER — LIDOCAINE HYDROCHLORIDE 20 MG/ML
INJECTION, SOLUTION EPIDURAL; INFILTRATION; INTRACAUDAL; PERINEURAL AS NEEDED
Status: DISCONTINUED | OUTPATIENT
Start: 2022-10-06 | End: 2022-10-06

## 2022-10-06 RX ORDER — PROPOFOL 10 MG/ML
INJECTION, EMULSION INTRAVENOUS CONTINUOUS PRN
Status: DISCONTINUED | OUTPATIENT
Start: 2022-10-06 | End: 2022-10-06

## 2022-10-06 RX ORDER — SODIUM CHLORIDE, SODIUM LACTATE, POTASSIUM CHLORIDE, CALCIUM CHLORIDE 600; 310; 30; 20 MG/100ML; MG/100ML; MG/100ML; MG/100ML
20 INJECTION, SOLUTION INTRAVENOUS CONTINUOUS
Status: DISCONTINUED | OUTPATIENT
Start: 2022-10-06 | End: 2022-10-10 | Stop reason: HOSPADM

## 2022-10-06 RX ORDER — ONDANSETRON 2 MG/ML
4 INJECTION INTRAMUSCULAR; INTRAVENOUS ONCE AS NEEDED
Status: DISCONTINUED | OUTPATIENT
Start: 2022-10-06 | End: 2022-10-10 | Stop reason: HOSPADM

## 2022-10-06 RX ORDER — SODIUM CHLORIDE, SODIUM LACTATE, POTASSIUM CHLORIDE, CALCIUM CHLORIDE 600; 310; 30; 20 MG/100ML; MG/100ML; MG/100ML; MG/100ML
125 INJECTION, SOLUTION INTRAVENOUS CONTINUOUS
Status: DISCONTINUED | OUTPATIENT
Start: 2022-10-06 | End: 2022-10-10 | Stop reason: HOSPADM

## 2022-10-06 RX ORDER — PANTOPRAZOLE SODIUM 40 MG/1
40 TABLET, DELAYED RELEASE ORAL
Qty: 180 TABLET | Refills: 1 | Status: SHIPPED | OUTPATIENT
Start: 2022-10-06 | End: 2023-01-04

## 2022-10-06 RX ADMIN — PROPOFOL 100 MG: 10 INJECTION, EMULSION INTRAVENOUS at 12:21

## 2022-10-06 RX ADMIN — PROPOFOL 30 MG: 10 INJECTION, EMULSION INTRAVENOUS at 12:23

## 2022-10-06 RX ADMIN — SODIUM CHLORIDE, SODIUM LACTATE, POTASSIUM CHLORIDE, AND CALCIUM CHLORIDE: .6; .31; .03; .02 INJECTION, SOLUTION INTRAVENOUS at 12:10

## 2022-10-06 RX ADMIN — PROPOFOL 140 MCG/KG/MIN: 10 INJECTION, EMULSION INTRAVENOUS at 12:21

## 2022-10-06 RX ADMIN — LIDOCAINE HYDROCHLORIDE 100 MG: 20 INJECTION, SOLUTION EPIDURAL; INFILTRATION; INTRACAUDAL at 12:21

## 2022-10-06 NOTE — ANESTHESIA PREPROCEDURE EVALUATION
Procedure:  EGD    Relevant Problems   CARDIO   (+) Essential hypertension      GI/HEPATIC   (+) Rectal bleeding      MUSCULOSKELETAL   (+) Gout      PULMONARY   (+) JOSE (obstructive sleep apnea)      Digestive   (+) Ulcerative pancolitis with complication (HCC)      Other   (+) Dyspepsia   (+) Obesity        Physical Exam    Airway    Mallampati score: III  TM Distance: >3 FB  Neck ROM: full     Dental       Cardiovascular      Pulmonary      Other Findings        Anesthesia Plan  ASA Score- 2     Anesthesia Type- IV sedation with anesthesia with ASA Monitors  Additional Monitors:   Airway Plan:           Plan Factors-    Chart reviewed  Existing labs reviewed  Patient summary reviewed  Induction- intravenous  Postoperative Plan-     Informed Consent- Anesthetic plan and risks discussed with patient  I personally reviewed this patient with the CRNA  Discussed and agreed on the Anesthesia Plan with the CRNA  Nick Alonso

## 2022-10-06 NOTE — H&P
History and Physical - [facility]  Sherry Lancaster 61 y o  male MRN: [id number]    HPI: Sherry Lancaster is a 61y o  year old male who presents for evaluation of dyspepsia  Review of Systems    Historical Information   Past Medical History:   Diagnosis Date    Colitis     Hypertension     Sleep apnea      Past Surgical History:   Procedure Laterality Date    CARDIAC CATHETERIZATION      COLONOSCOPY      VT COLONOSCOPY FLX DX W/COLLJ SPEC WHEN PFRMD N/A 4/25/2016    Procedure: COLONOSCOPY;  Surgeon: Lola Caruso MD;  [facility]: [facility]; Service: Gastroenterology    VT SIGMOIDOSCOPY FLX DX W/COLLJ SPEC BR/WA IF PFRMD N/A 12/4/2018    Procedure: Tamea Krabbe;  Surgeon: Sri Rubio MD;  [facility]: [facility]; Service: Gastroenterology     Social History   Social History     Substance and Sexual Activity   Alcohol Use Yes    Comment: 2 per month     Social History     Substance and Sexual Activity   Drug Use No     Social History     Tobacco Use   Smoking Status Former Smoker   Smokeless Tobacco Never Used   Tobacco Comment    QUIT 2018     Family History   Problem Relation Age of Onset    Diabetes Mother     Heart disease Father         Cardiac Disorder    Heart disease Brother         Cardiac Disorder    Eczema Sister        Meds/Allergies     (Not in a hospital admission)      Allergies   Allergen Reactions    Humira [Adalimumab] Rash       Objective     /74   Pulse 76   Temp 97 8 °F (36 6 °C) (Temporal)   Resp 19   Ht 5' 7" (1 702 m)   Wt 96 6 kg (213 lb)   SpO2 97%   BMI 33 36 kg/m²       PHYSICAL EXAM    Gen: NAD  CV: RRR  CHEST: Clear  ABD: soft, NT/ND  EXT: no edema  Neuro: AAO      ASSESSMENT/PLAN:  This is a 61y o  year old male here for evaluation of dyspepsia    PLAN:   Procedure:  EGD

## 2022-10-06 NOTE — ANESTHESIA POSTPROCEDURE EVALUATION
Post-Op Assessment Note    CV Status:  Stable  Pain Score: 0    Pain management: adequate     Mental Status:  Sleepy   Hydration Status:  Euvolemic   PONV Controlled:  Controlled   Airway Patency:  Patent      Post Op Vitals Reviewed: Yes      Staff: CRNA         No complications documented      BP   111/59   Temp 96 6   Pulse 65   Resp 21   SpO2 94% 6 L mask

## 2022-10-13 ENCOUNTER — TELEPHONE (OUTPATIENT)
Dept: GASTROENTEROLOGY | Facility: AMBULARY SURGERY CENTER | Age: 57
End: 2022-10-13

## 2022-10-13 DIAGNOSIS — K51.019 ULCERATIVE PANCOLITIS WITH COMPLICATION (HCC): Primary | ICD-10-CM

## 2022-10-13 NOTE — TELEPHONE ENCOUNTER
===View-only below this line===  ----- Message -----  From: JAMES Rivera  Sent: 10/13/2022   3:41 PM EDT  To: Marci Kyle MD, *    ----- Message from Haris Murry sent at 10/13/2022  3:41 PM EDT -----  Please have patient obtain stool studies/labs for further assessment of his diarrhea  If negative for infection, he may require a course of steroids for UC flare & can consider checking Stelera level/antibody level prior to next injection  He has upcoming appointment with Michelle Giron on 11/17

## 2022-10-13 NOTE — TELEPHONE ENCOUNTER
Patients GI provider:  Dr Jordan Andres  Number to return call: (  [phone]    Reason for call: Pt calling with diarrhea for a few days , would like advice    Scheduled procedure/appointment date if applicable: Apt/procedure   11-17-22

## 2022-10-13 NOTE — TELEPHONE ENCOUNTER
Spoke with patient  History of UC with pan colitis and Dyspepsia  Last OV 7/8  Next OV 11/17  EGD 10/6    Patient c/o of diarrhea that started on Oct 10th  8-10 bowel movements daily, urgency at night and with meals  Consistency yellow/brown  Denies black stools  Minimal blood on toilet paper after wipes, he believes from hemorrhoids  Abdominal pain LLQ dull intermittent 5 out of 10  Pantoprazole 40 mg BID started 10/7 that was recommended after recent EGD  Takes Mesalamine 800mg 2 tabs TID, last Stelara injection 10/5-next injection 12/5  Denies n/v, fever, SOB, weakness      Please Advise

## 2022-10-13 NOTE — TELEPHONE ENCOUNTER
Spoke with patient, reviewed provider recommendations  Patient states will continue to increase fluids and [place] diet  Patient verbalized understanding, no further questions

## 2022-10-14 ENCOUNTER — APPOINTMENT (OUTPATIENT)
Dept: LAB | Facility: CLINIC | Age: 57
End: 2022-10-14
Payer: COMMERCIAL

## 2022-10-14 DIAGNOSIS — K51.019 ULCERATIVE PANCOLITIS WITH COMPLICATION (HCC): ICD-10-CM

## 2022-10-14 LAB
ALBUMIN SERPL BCP-MCNC: 3.9 G/DL (ref 3.5–5)
ALP SERPL-CCNC: 62 U/L (ref 34–104)
ALT SERPL W P-5'-P-CCNC: 25 U/L (ref 7–52)
ANION GAP SERPL CALCULATED.3IONS-SCNC: 6 MMOL/L (ref 4–13)
AST SERPL W P-5'-P-CCNC: 19 U/L (ref 13–39)
BASOPHILS # BLD AUTO: 0.05 THOUSANDS/ΜL (ref 0–0.1)
BASOPHILS NFR BLD AUTO: 1 % (ref 0–1)
BILIRUB SERPL-MCNC: 0.63 MG/DL (ref 0.2–1)
BUN SERPL-MCNC: 16 MG/DL (ref 5–25)
CALCIUM SERPL-MCNC: 9 MG/DL (ref 8.4–10.2)
CHLORIDE SERPL-SCNC: 105 MMOL/L (ref 96–108)
CO2 SERPL-SCNC: 28 MMOL/L (ref 21–32)
CREAT SERPL-MCNC: 1.18 MG/DL (ref 0.6–1.3)
CRP SERPL QL: 11.7 MG/L
EOSINOPHIL # BLD AUTO: 0.27 THOUSAND/ΜL (ref 0–0.61)
EOSINOPHIL NFR BLD AUTO: 4 % (ref 0–6)
ERYTHROCYTE [DISTWIDTH] IN BLOOD BY AUTOMATED COUNT: 12.5 % (ref 11.6–15.1)
GFR SERPL CREATININE-BSD FRML MDRD: 67 ML/MIN/1.73SQ M
GLUCOSE P FAST SERPL-MCNC: 96 MG/DL (ref 65–99)
HCT VFR BLD AUTO: 44.5 % (ref 36.5–49.3)
HGB BLD-MCNC: 15.1 G/DL (ref 12–17)
IMM GRANULOCYTES # BLD AUTO: 0.02 THOUSAND/UL (ref 0–0.2)
IMM GRANULOCYTES NFR BLD AUTO: 0 % (ref 0–2)
LYMPHOCYTES # BLD AUTO: 2.27 THOUSANDS/ΜL (ref 0.6–4.47)
LYMPHOCYTES NFR BLD AUTO: 29 % (ref 14–44)
MCH RBC QN AUTO: 30.7 PG (ref 26.8–34.3)
MCHC RBC AUTO-ENTMCNC: 33.9 G/DL (ref 31.4–37.4)
MCV RBC AUTO: 90 FL (ref 82–98)
MONOCYTES # BLD AUTO: 0.78 THOUSAND/ΜL (ref 0.17–1.22)
MONOCYTES NFR BLD AUTO: 10 % (ref 4–12)
NEUTROPHILS # BLD AUTO: 4.41 THOUSANDS/ΜL (ref 1.85–7.62)
NEUTS SEG NFR BLD AUTO: 56 % (ref 43–75)
NRBC BLD AUTO-RTO: 0 /100 WBCS
PLATELET # BLD AUTO: 298 THOUSANDS/UL (ref 149–390)
PMV BLD AUTO: 9.2 FL (ref 8.9–12.7)
POTASSIUM SERPL-SCNC: 4 MMOL/L (ref 3.5–5.3)
PROT SERPL-MCNC: 7.2 G/DL (ref 6.4–8.4)
RBC # BLD AUTO: 4.92 MILLION/UL (ref 3.88–5.62)
SODIUM SERPL-SCNC: 139 MMOL/L (ref 135–147)
WBC # BLD AUTO: 7.8 THOUSAND/UL (ref 4.31–10.16)

## 2022-10-14 PROCEDURE — 86140 C-REACTIVE PROTEIN: CPT

## 2022-10-14 PROCEDURE — 85025 COMPLETE CBC W/AUTO DIFF WBC: CPT

## 2022-10-14 PROCEDURE — 80053 COMPREHEN METABOLIC PANEL: CPT

## 2022-10-14 PROCEDURE — 36415 COLL VENOUS BLD VENIPUNCTURE: CPT

## 2022-10-16 ENCOUNTER — OFFICE VISIT (OUTPATIENT)
Dept: URGENT CARE | Age: 57
End: 2022-10-16
Payer: COMMERCIAL

## 2022-10-16 ENCOUNTER — NURSE TRIAGE (OUTPATIENT)
Dept: OTHER | Facility: OTHER | Age: 57
End: 2022-10-16

## 2022-10-16 VITALS
HEIGHT: 67 IN | BODY MASS INDEX: 32.65 KG/M2 | RESPIRATION RATE: 16 BRPM | TEMPERATURE: 96.8 F | WEIGHT: 208 LBS | OXYGEN SATURATION: 99 % | HEART RATE: 55 BPM

## 2022-10-16 DIAGNOSIS — M10.9 ACUTE GOUT INVOLVING TOE OF LEFT FOOT, UNSPECIFIED CAUSE: Primary | ICD-10-CM

## 2022-10-16 PROCEDURE — 99283 EMERGENCY DEPT VISIT LOW MDM: CPT

## 2022-10-16 PROCEDURE — G0382 LEV 3 HOSP TYPE B ED VISIT: HCPCS

## 2022-10-16 RX ORDER — COLCHICINE 0.6 MG/1
TABLET ORAL
Qty: 3 TABLET | Refills: 0 | Status: SHIPPED | OUTPATIENT
Start: 2022-10-16

## 2022-10-16 RX ORDER — PREDNISONE 20 MG/1
20 TABLET ORAL DAILY
Qty: 5 TABLET | Refills: 0 | Status: SHIPPED | OUTPATIENT
Start: 2022-10-16 | End: 2022-10-21

## 2022-10-16 NOTE — TELEPHONE ENCOUNTER
Patient refused to schedule an appointment for tomorrow due to leaving for work early in a morning  Patient stated that he would proceed to [facility] today for further evaluation

## 2022-10-16 NOTE — TELEPHONE ENCOUNTER
Reason for Disposition  • Pain in the big toe joint    Answer Assessment - Initial Assessment Questions  1  ONSET: "When did the pain start?"       Yesterday   2  LOCATION: "Where is the pain located?"       Inflammation in a left big toe  3  PAIN: "How bad is the pain?"    (Scale 1-10; or mild, moderate, severe)   - MILD (1-3): doesn't interfere with normal activities  - MODERATE (4-7): interferes with normal activities (e g , work or school) or awakens from sleep, limping     - SEVERE (8-10): excruciating pain, unable to do any normal activities, unable to walk  8 out of 10   4  WORK OR EXERCISE: "Has there been any recent work or exercise that involved this part of the body?"        No   5  CAUSE: "What do you think is causing the foot pain?"      Hx of gout, flare up of gout   6   OTHER SYMPTOMS: "Do you have any other symptoms?" (e g , leg pain, rash, fever, numbness)      No    Protocols used: FOOT PAIN-ADULT-

## 2022-10-16 NOTE — PROGRESS NOTES
[facility]        NAME: Ryan Goddard is a 61 y o  male  : 1965    MRN: [id number]  DATE: 2022  TIME: 12:40 PM    Assessment and Plan   Acute gout involving toe of left foot, unspecified cause [M10 9]  1  Acute gout involving toe of left foot, unspecified cause  predniSONE 20 mg tablet    colchicine (COLCRYS) 0 6 mg tablet   Empirically treat for gout with short course of prednisone and colchicine  Patient advised to continue over-the-counter Tylenol/NSAIDs and ice as needed for pain and swelling  Follow-up with primary care provider symptoms do not improve within 1-2 weeks  Patient Instructions   Gout   WHAT YOU NEED TO KNOW:   Gout is a form of arthritis that causes severe joint pain, redness, swelling, and stiffness  Acute gout pain starts suddenly, gets worse quickly, and stops on its own  Acute gout can become chronic and cause permanent damage to the joints  DISCHARGE INSTRUCTIONS:   Return to the emergency department if:   · You have severe pain in one or more of your joints that you cannot tolerate      · You have a fever or redness that spreads beyond the joint area      Call your doctor if:   · You have new symptoms, such as a rash, after you start gout treatment      · Your joint pain and swelling do not go away, even after treatment      · You are not urinating as much or as often as you usually do      · You have trouble taking your gout medicines      · You have questions or concerns about your condition or care      Medicines: You may need any of the following:  · Prescription pain medicine  may be given  Ask your healthcare provider how to take this medicine safely  Some prescription pain medicines contain acetaminophen  Do not take other medicines that contain acetaminophen without talking to your healthcare provider  Too much acetaminophen may cause liver damage  Prescription pain medicine may cause constipation   Ask your healthcare provider how to prevent or treat constipation       · NSAIDs , such as ibuprofen, help decrease swelling, pain, and fever  This medicine is available with or without a doctor's order  NSAIDs can cause stomach bleeding or kidney problems in certain people  If you take blood thinner medicine, always ask your healthcare provider if NSAIDs are safe for you  Always read the medicine label and follow directions      · Gout medicine  decreases joint pain and swelling  It may also be given to prevent new gout attacks      · Steroids  reduce inflammation and can help your joint stiffness and pain during gout attacks      · Uric acid medicine  may be given to reduce the amount of uric acid your body makes  Some medicines may help you pass more uric acid when you urinate      · Take your medicine as directed  Contact your healthcare provider if you think your medicine is not helping or if you have side effects  Tell him or her if you are allergic to any medicine  Keep a list of the medicines, vitamins, and herbs you take  Include the amounts, and when and why you take them  Bring the list or the pill bottles to follow-up visits  Carry your medicine list with you in case of an emergency      Manage your symptoms:       · Rest your painful joint so it can heal   Your healthcare provider may recommend crutches or a walker if the affected joint is in a leg      · Apply ice to your joint  Ice decreases pain and swelling  Use an ice pack, or put crushed ice in a plastic bag  Cover the ice pack or bag with a towel before you apply it to your painful joint  Apply ice for 15 to 20 minutes every hour, or as directed      · Elevate your joint  Elevation helps reduce swelling and pain  Raise your joint above the level of your heart as often as you can  Prop your painful joint on pillows to keep it above your heart comfortably      · Go to physical therapy if directed    A physical therapist can teach you exercises to improve flexibility and range of motion      Help prevent gout attacks:   · Do not eat high-purine foods  These foods include meats, seafood, asparagus, spinach, cauliflower, and some types of beans  Healthcare providers may tell you to eat more low-fat milk products, such as yogurt  Milk products may decrease your risk for gout attacks  Vitamin C and coffee may also help  Your healthcare provider or dietitian can help you create a meal plan      · Drink liquids as directed  Liquids such as water help remove uric acid from your body  Ask how much liquid to drink each day and which liquids are best for you      · Maintain a healthy weight  Weight loss may decrease the amount of uric acid in your body  Ask your healthcare provider what a healthy weight is for you  Ask him or her to help you create a weight loss plan if you are overweight      · Control your blood sugar level if you have diabetes  Keep your blood sugar level in a normal range  This can help prevent gout attacks      · Limit or do not drink alcohol as directed  Alcohol can trigger a gout attack  Alcohol also increases your risk for dehydration  Ask your healthcare provider if alcohol is safe for you      Follow up with your doctor as directed: You may be referred to a rheumatologist or podiatrist  Write down your questions so you remember to ask them during your visits  © Copyright [employer] 2022 Information is for End User's use only and may not be sold, redistributed or otherwise used for commercial purposes  All illustrations and images included in CareNotes® are the copyrighted property of [employer]  or [employer] Arau[employer]   The above information is an  only  It is not intended as medical advice for individual conditions or treatments  Talk to your doctor, nurse or pharmacist before following any medical regimen to see if it is safe and effective for you            Follow up with PCP in 3-5 days  Proceed to  ER if symptoms worsen      Chief Complaint Chief Complaint   Patient presents with   • Gout Pain     BIG TOE LEFT FOOT    FLARE UP     NONE FOR 6 YRS    STARTED THIS OCCURRENCE 2 DAYS AGO    History of Present Illness       Patient is a 58-year-old male with past medical history significant for hypertension, sleep apnea, gout, who presents for evaluation of left great toe pain since last evening  He reports that this feels the same as his previous gout flare and is confident that that is what it is  He currently rates the pain as a 5/10 after taking two  ibuprofen, but notes that last night the pain was significantly worse  He notes that his last gout flare was 6 years ago, at which point he received an anti-inflammatory and colchicine  He denies fever, direct trauma or injury to toe, body aches or chills  He is able to walk without impaired gait  Review of Systems   Review of Systems   Constitutional: Negative for chills, fatigue and fever  HENT: Negative for congestion, ear pain, postnasal drip, rhinorrhea, sinus pressure, sinus pain, sneezing and sore throat  Eyes: Negative for pain and visual disturbance  Respiratory: Negative  Negative for apnea, cough, choking, chest tightness, shortness of breath, wheezing and stridor  Cardiovascular: Negative for chest pain and palpitations  Gastrointestinal: Negative for abdominal pain, constipation, diarrhea, nausea and vomiting  Endocrine: Negative  Genitourinary: Negative for dysuria and hematuria  Musculoskeletal: Positive for arthralgias and joint swelling  Negative for back pain, gait problem, myalgias, neck pain and neck stiffness  Skin: Positive for color change  Negative for pallor, rash and wound  Allergic/Immunologic: Negative  Neurological: Negative  Negative for dizziness, seizures, syncope, facial asymmetry, light-headedness, numbness and headaches  Hematological: Negative  Psychiatric/Behavioral: Negative      All other systems reviewed and are negative  Current Medications       Current Outpatient Medications:   •  colchicine (COLCRYS) 0 6 mg tablet, Take 1 2 mg (2 tablets) take 1 2 mg once, then 0 6 mg (1 tablet) one hour later  , Disp: 3 tablet, Rfl: 0  •  losartan (COZAAR) 50 mg tablet, TAKE 1 TABLET BY MOUTH EVERY DAY AS DIRECTED, Disp: 30 tablet, Rfl: 5  •  mesalamine (ASACOL) 800 MG EC tablet, TAKE TWO TABLETS BY MOUTH THREE TIMES A DAY, Disp: 180 tablet, Rfl: 5  •  pantoprazole (PROTONIX) 40 mg tablet, Take 1 tablet (40 mg total) by mouth 2 (two) times a day before meals, Disp: 180 tablet, Rfl: 1  •  predniSONE 20 mg tablet, Take 1 tablet (20 mg total) by mouth daily for 5 days, Disp: 5 tablet, Rfl: 0  •  ustekinumab (STELARA) 90 mg/mL subcutaneous injection, Inject 1 mL (90 mg total) under the skin every 56 days for 1 dose, Disp: 1 mL, Rfl: 6  •  triamcinolone (KENALOG) 0 1 % ointment, aPPLY sparingly two to four times a day for Up to 2 weeks  (Patient not taking: Reported on 10/16/2022), Disp: 80 g, Rfl: 0    Current Allergies     Allergies as of 10/16/2022 - Reviewed 10/16/2022   Allergen Reaction Noted   • Humira [adalimumab] Rash 07/08/2022            The following portions of the patient's history were reviewed and updated as appropriate: allergies, current medications, past family history, past medical history, past social history, past surgical history and problem list      Past Medical History:   Diagnosis Date   • Colitis    • Hypertension    • Sleep apnea        Past Surgical History:   Procedure Laterality Date   • CARDIAC CATHETERIZATION     • COLONOSCOPY     • NE COLONOSCOPY FLX DX W/COLLJ SPEC WHEN PFRMD N/A 4/25/2016    Procedure: COLONOSCOPY;  Surgeon: Shawna Castellano MD;  Location: [facility]; Service: Gastroenterology   • NE SIGMOIDOSCOPY FLX DX W/COLLJ SPEC BR/WA IF PFRMD N/A 12/4/2018    Procedure: Rebekah Buck;  Surgeon: Elena Cordero MD;  Location: [facility];   Service: Gastroenterology       Family History Problem Relation Age of Onset   • Diabetes Mother    • Heart disease Father         Cardiac Disorder   • Heart disease Brother         Cardiac Disorder   • Eczema Sister          Medications have been verified  Objective   Pulse 55   Temp (!) 96 8 °F (36 °C)   Resp 16   Ht 5' 7" (1 702 m)   Wt 94 3 kg (208 lb)   SpO2 99%   BMI 32 58 kg/m²        Physical Exam     Physical Exam  Vitals reviewed  Constitutional:       General: He is not in acute distress  Appearance: Normal appearance  He is not ill-appearing, toxic-appearing or diaphoretic  HENT:      Head: Normocephalic and atraumatic  Right Ear: External ear normal       Left Ear: External ear normal       Nose: Nose normal  No congestion or rhinorrhea  Mouth/Throat:      Mouth: Mucous membranes are moist       Pharynx: No oropharyngeal exudate or posterior oropharyngeal erythema  Eyes:      Extraocular Movements: Extraocular movements intact  Pupils: Pupils are equal, round, and reactive to light  Cardiovascular:      Rate and Rhythm: Normal rate and regular rhythm  Pulses: Normal pulses  Dorsalis pedis pulses are 2+ on the left side  Heart sounds: Normal heart sounds  No murmur heard  No friction rub  No gallop  Pulmonary:      Effort: Pulmonary effort is normal  No respiratory distress  Breath sounds: Normal breath sounds  No stridor  No wheezing, rhonchi or rales  Chest:      Chest wall: No tenderness  Musculoskeletal:         General: Normal range of motion  Cervical back: Normal range of motion and neck supple  No rigidity or tenderness  Left foot: Normal range of motion  No deformity, bunion, Charcot foot, foot drop or prominent metatarsal heads  Feet:    Feet:      Left foot:      Skin integrity: Erythema present  No ulcer, blister, skin breakdown, warmth, callus, dry skin or fissure        Toenail Condition: Left toenails are normal       Comments: Mild erythema at base of great toe, with tenderness at MCP joint of left great toe  Mild swelling at this location  Lymphadenopathy:      Cervical: No cervical adenopathy  Skin:     General: Skin is warm and dry  Capillary Refill: Capillary refill takes less than 2 seconds  Findings: Erythema present  Neurological:      General: No focal deficit present  Mental Status: He is alert     Psychiatric:         Mood and Affect: Mood normal

## 2022-10-16 NOTE — TELEPHONE ENCOUNTER
Regarding: gout  ----- Message from Familia Maxwell sent at 10/16/2022  8:15 AM EDT -----  "I am having a flare of Gout in my left foot and toe  I can hardly walk   I need medication "

## 2022-10-19 PROCEDURE — 88305 TISSUE EXAM BY PATHOLOGIST: CPT | Performed by: PATHOLOGY

## 2022-10-19 PROCEDURE — 88342 IMHCHEM/IMCYTCHM 1ST ANTB: CPT | Performed by: PATHOLOGY

## 2022-12-23 ENCOUNTER — NURSE TRIAGE (OUTPATIENT)
Dept: OTHER | Facility: OTHER | Age: 57
End: 2022-12-23

## 2022-12-23 DIAGNOSIS — M10.9 ACUTE GOUT INVOLVING TOE OF LEFT FOOT, UNSPECIFIED CAUSE: ICD-10-CM

## 2022-12-23 RX ORDER — COLCHICINE 0.6 MG/1
TABLET ORAL
Qty: 3 TABLET | Refills: 0 | Status: SHIPPED | OUTPATIENT
Start: 2022-12-23 | End: 2023-01-26 | Stop reason: SDUPTHER

## 2022-12-23 NOTE — TELEPHONE ENCOUNTER
Answer Assessment - Initial Assessment Questions  1  ONSET: "When did the pain start?"       Yesterday  2  LOCATION: "Where is the pain located?"       Left great toe  3  PAIN: "How bad is the pain?"    (Scale 1-10; or mild, moderate, severe)   - MILD (1-3): doesn't interfere with normal activities  - MODERATE (4-7): interferes with normal activities (e g , work or school) or awakens from sleep, limping     - SEVERE (8-10): excruciating pain, unable to do any normal activities, unable to walk  Moderate hurts to walk  4  WORK OR EXERCISE: "Has there been any recent work or exercise that involved this part of the body?"       no  5  CAUSE: "What do you think is causing the foot pain?"      Gout  6   OTHER SYMPTOMS: "Do you have any other symptoms?" (e g , leg pain, rash, fever, numbness)      Red, inflamed    Protocols used: FOOT PAIN-ADULT-

## 2022-12-23 NOTE — TELEPHONE ENCOUNTER
Regarding: gout - left big toe pain, med refill  ----- Message from Noe Joy sent at 12/23/2022  9:49 AM EST -----  " I am having a gout flare up, my left big toe is very painful to walk on   Can the Dr  Please send my gout medication to the pharmacy"

## 2022-12-30 DIAGNOSIS — I10 ESSENTIAL HYPERTENSION: ICD-10-CM

## 2022-12-30 DIAGNOSIS — K51.80 OTHER ULCERATIVE COLITIS WITHOUT COMPLICATION (HCC): ICD-10-CM

## 2022-12-30 RX ORDER — MESALAMINE 800 MG/1
TABLET, DELAYED RELEASE ORAL
Qty: 180 TABLET | Refills: 5 | Status: SHIPPED | OUTPATIENT
Start: 2022-12-30

## 2022-12-30 RX ORDER — LOSARTAN POTASSIUM 50 MG/1
TABLET ORAL
Qty: 30 TABLET | Refills: 0 | Status: SHIPPED | OUTPATIENT
Start: 2022-12-30

## 2022-12-30 NOTE — TELEPHONE ENCOUNTER
I will refill it this time  He needs to come in for a visit asap   Please schedule     Dr Mita Barkley

## 2023-01-26 ENCOUNTER — OFFICE VISIT (OUTPATIENT)
Dept: FAMILY MEDICINE CLINIC | Facility: CLINIC | Age: 58
End: 2023-01-26

## 2023-01-26 ENCOUNTER — NURSE TRIAGE (OUTPATIENT)
Dept: OTHER | Facility: OTHER | Age: 58
End: 2023-01-26

## 2023-01-26 VITALS
RESPIRATION RATE: 16 BRPM | WEIGHT: 209.4 LBS | DIASTOLIC BLOOD PRESSURE: 78 MMHG | OXYGEN SATURATION: 97 % | SYSTOLIC BLOOD PRESSURE: 142 MMHG | BODY MASS INDEX: 32.87 KG/M2 | HEART RATE: 61 BPM | HEIGHT: 67 IN | TEMPERATURE: 97.5 F

## 2023-01-26 DIAGNOSIS — K51.019 ULCERATIVE PANCOLITIS WITH COMPLICATION (HCC): ICD-10-CM

## 2023-01-26 DIAGNOSIS — R73.01 IMPAIRED FASTING GLUCOSE: ICD-10-CM

## 2023-01-26 DIAGNOSIS — E66.09 CLASS 1 OBESITY DUE TO EXCESS CALORIES WITH SERIOUS COMORBIDITY AND BODY MASS INDEX (BMI) OF 33.0 TO 33.9 IN ADULT: ICD-10-CM

## 2023-01-26 DIAGNOSIS — M10.9 ACUTE GOUT INVOLVING TOE OF LEFT FOOT, UNSPECIFIED CAUSE: Primary | ICD-10-CM

## 2023-01-26 DIAGNOSIS — I10 ESSENTIAL HYPERTENSION: ICD-10-CM

## 2023-01-26 DIAGNOSIS — Z12.5 SCREENING FOR MALIGNANT NEOPLASM OF PROSTATE: ICD-10-CM

## 2023-01-26 RX ORDER — LOSARTAN POTASSIUM 50 MG/1
50 TABLET ORAL DAILY
Qty: 30 TABLET | Refills: 5 | Status: SHIPPED | OUTPATIENT
Start: 2023-01-26

## 2023-01-26 RX ORDER — COLCHICINE 0.6 MG/1
TABLET ORAL
Qty: 3 TABLET | Refills: 0 | Status: SHIPPED | OUTPATIENT
Start: 2023-01-26

## 2023-01-26 RX ORDER — ALLOPURINOL 100 MG/1
100 TABLET ORAL DAILY
Qty: 30 TABLET | Refills: 5 | Status: SHIPPED | OUTPATIENT
Start: 2023-01-26

## 2023-01-26 NOTE — TELEPHONE ENCOUNTER
Patient called in stating he is having gout flare up  states left foot is red and slightly swollen  Limping  Denies any other symptoms or fever  Appointment today at [phone]  Provided care advice   Verbalized  understanding  Reason for Disposition  • Patient wants to be seen    Answer Assessment - Initial Assessment Questions  1  ONSET: "When did the pain start?"   yesterday   2  LOCATION: "Where is the pain located?"      Left foot at joint of big toe   3  PAIN: "How bad is the pain?"    (Scale 1-10; or mild, moderate, severe)   - MILD (1-3): doesn't interfere with normal activities  - MODERATE (4-7): interferes with normal activities (e g , work or school) or awakens from sleep, limping     - SEVERE (8-10): excruciating pain, unable to do any normal activities, unable to walk  5/10  4  WORK OR EXERCISE: "Has there been any recent work or exercise that involved this part of the body?"       Denies   5  CAUSE: "What do you think is causing the foot pain?"     gout  6   OTHER SYMPTOMS: "Do you have any other symptoms?" (e g , leg pain, rash, fever, numbness)      denies    Protocols used: FOOT PAIN-ADULT-[place]

## 2023-01-26 NOTE — PROGRESS NOTES
FAMILY PRACTICE OFFICE VISIT       NAME: Cosmo Painter  AGE: 60 y o  SEX: male       : 1965        MRN: [id number]    DATE: 2023  TIME: 1:03 PM    Assessment and Plan   1  Acute gout involving toe of left foot, unspecified cause  -     colchicine (COLCRYS) 0 6 mg tablet; Take 1 2 mg (2 tablets) take 1 2 mg once, then 0 6 mg (1 tablet) one hour later  -     allopurinol (ZYLOPRIM) 100 mg tablet; Take 1 tablet (100 mg total) by mouth daily  -     Comprehensive metabolic panel; Future  -     Uric acid; Future  -     CBC and differential; Future  -     TSH, 3rd generation with Free T4 reflex; Future    2  Ulcerative pancolitis with complication (HCC)  -     Comprehensive metabolic panel; Future  -     CBC and differential; Future    3  Essential hypertension  -     losartan (COZAAR) 50 mg tablet; Take 1 tablet (50 mg total) by mouth daily As directed  -     Comprehensive metabolic panel; Future  -     CBC and differential; Future  -     Lipid Panel with Direct LDL reflex; Future  -     TSH, 3rd generation with Free T4 reflex; Future    4  Impaired fasting glucose  -     TSH, 3rd generation with Free T4 reflex; Future  -     HEMOGLOBIN A1C W/ EAG ESTIMATION; Future    5  Class 1 obesity due to excess calories with serious comorbidity and body mass index (BMI) of 33 0 to 33 9 in adult  -     Lipid Panel with Direct LDL reflex; Future  -     TSH, 3rd generation with Free T4 reflex; Future    6  Screening for malignant neoplasm of prostate  -     PSA, Total Screen;  Future                 Chief Complaint     Chief Complaint   Patient presents with   • same day sick     Gout pain x 1 day       History of Present Illness   Cosmo Painter is a 60y o -year-old male who is here for c/o gout to left great toe  Started few days ago  Took no meds  This is third gout attack in past few months  Eating healthier per patient  Avoiding foods that would trigger attack    Review of Systems   Review of Systems Constitutional: Negative for fatigue and fever  Respiratory: Negative for cough and shortness of breath  Cardiovascular: Negative for chest pain and palpitations  Musculoskeletal: Positive for arthralgias and myalgias  Skin: Negative for rash  Hematological: Negative for adenopathy  Psychiatric/Behavioral: Negative for dysphoric mood and self-injury  Active Problem List     Patient Active Problem List   Diagnosis   • Essential hypertension   • Gout   • Colitis with rectal bleeding   • Ulcerative pancolitis with complication (HCC)   • Rectal bleeding   • Dysplasia of colon   • Abnormal rectal biopsy   • Dyspepsia   • JOSE (obstructive sleep apnea)   • Rash in adult   • Obesity         Past Medical History:  Past Medical History:   Diagnosis Date   • Colitis    • Hypertension    • Sleep apnea        Past Surgical History:  Past Surgical History:   Procedure Laterality Date   • CARDIAC CATHETERIZATION     • COLONOSCOPY     • MT COLONOSCOPY FLX DX W/COLLJ SPEC WHEN PFRMD N/A 4/25/2016    Procedure: COLONOSCOPY;  Surgeon: Maryjo Duane, MD;  Location: [facility]; Service: Gastroenterology   • MT SIGMOIDOSCOPY FLX DX W/COLLJ SPEC BR/WA IF PFRMD N/A 12/4/2018    Procedure: Floyce Bogota;  Surgeon: Jazmin Cheney MD;  Location: [facility];   Service: Gastroenterology       Family History:  Family History   Problem Relation Age of Onset   • Diabetes Mother    • Heart disease Father         Cardiac Disorder   • Heart disease Brother         Cardiac Disorder   • Eczema Sister        Social History:  Social History     Socioeconomic History   • Marital status: /Civil Union     Spouse name: Not on file   • Number of children: Not on file   • Years of education: Not on file   • Highest education level: Not on file   Occupational History   • Not on file   Tobacco Use   • Smoking status: Former   • Smokeless tobacco: Never   • Tobacco comments:     QUIT 2018   Vaping Use   • Vaping Use: Every day   • Substances: Nicotine, Flavoring   Substance and Sexual Activity   • Alcohol use: Yes     Comment: 2 per month   • Drug use: No   • Sexual activity: Not on file   Other Topics Concern   • Not on file   Social History Narrative    Caffeine use: 2 servings daily     Social Determinants of Health     Financial Resource Strain: Not on file   Food Insecurity: Not on file   Transportation Needs: Not on file   Physical Activity: Not on file   Stress: Not on file   Social Connections: Not on file   Intimate Partner Violence: Not on file   Housing Stability: Not on file       Objective     Vitals:    01/26/23 1132   BP: 142/78   Pulse: 61   Resp: 16   Temp: 97 5 °F (36 4 °C)   SpO2: 97%     Wt Readings from Last 3 Encounters:   01/26/23 95 kg (209 lb 6 4 oz)   10/16/22 94 3 kg (208 lb)   10/06/22 96 6 kg (213 lb)       Physical Exam  Vitals and nursing note reviewed  Constitutional:       Appearance: Normal appearance  HENT:      Head: Normocephalic and atraumatic  Eyes:      Conjunctiva/sclera: Conjunctivae normal    Cardiovascular:      Rate and Rhythm: Regular rhythm  Heart sounds: Normal heart sounds  Pulmonary:      Effort: Pulmonary effort is normal       Breath sounds: Normal breath sounds  Musculoskeletal:        Legs:    Skin:     General: Skin is warm and dry  Neurological:      Mental Status: He is alert and oriented to person, place, and time     Psychiatric:         Mood and Affect: Mood normal          Behavior: Behavior normal          Pertinent Laboratory/Diagnostic Studies:  Lab Results   Component Value Date    BUN 16 10/14/2022    CREATININE 1 18 10/14/2022    CALCIUM 9 0 10/14/2022     12/16/2017    K 4 0 10/14/2022    CO2 28 10/14/2022     10/14/2022     Lab Results   Component Value Date    ALT 25 10/14/2022    AST 19 10/14/2022    ALKPHOS 62 10/14/2022    BILITOT 0 3 12/16/2017       Lab Results   Component Value Date    WBC 7 80 10/14/2022    HGB 15 1 10/14/2022 HCT 44 5 10/14/2022    MCV 90 10/14/2022     10/14/2022       No results found for: TSH    Lab Results   Component Value Date    CHOL 182 12/20/2016     Lab Results   Component Value Date    TRIG 54 12/10/2021     Lab Results   Component Value Date    HDL 49 12/10/2021     Lab Results   Component Value Date    LDLCALC 80 12/10/2021     Lab Results   Component Value Date    HGBA1C 5 9 (H) 12/16/2017       Results for orders placed or performed in visit on 10/14/22   C-reactive protein   Result Value Ref Range    CRP 11 7 (H) <3 0 mg/L   CBC and differential   Result Value Ref Range    WBC 7 80 4 31 - 10 16 Thousand/uL    RBC 4 92 3 88 - 5 62 Million/uL    Hemoglobin 15 1 12 0 - 17 0 g/dL    Hematocrit 44 5 36 5 - 49 3 %    MCV 90 82 - 98 fL    MCH 30 7 26 8 - 34 3 pg    MCHC 33 9 31 4 - 37 4 g/dL    RDW 12 5 11 6 - 15 1 %    MPV 9 2 8 9 - 12 7 fL    Platelets 559 898 - 053 Thousands/uL    nRBC 0 /100 WBCs    Neutrophils Relative 56 43 - 75 %    Immat GRANS % 0 0 - 2 %    Lymphocytes Relative 29 14 - 44 %    Monocytes Relative 10 4 - 12 %    Eosinophils Relative 4 0 - 6 %    Basophils Relative 1 0 - 1 %    Neutrophils Absolute 4 41 1 85 - 7 62 Thousands/µL    Immature Grans Absolute 0 02 0 00 - 0 20 Thousand/uL    Lymphocytes Absolute 2 27 0 60 - 4 47 Thousands/µL    Monocytes Absolute 0 78 0 17 - 1 22 Thousand/µL    Eosinophils Absolute 0 27 0 00 - 0 61 Thousand/µL    Basophils Absolute 0 05 0 00 - 0 10 Thousands/µL   Comprehensive metabolic panel   Result Value Ref Range    Sodium 139 135 - 147 mmol/L    Potassium 4 0 3 5 - 5 3 mmol/L    Chloride 105 96 - 108 mmol/L    CO2 28 21 - 32 mmol/L    ANION GAP 6 4 - 13 mmol/L    BUN 16 5 - 25 mg/dL    Creatinine 1 18 0 60 - 1 30 mg/dL    Glucose, Fasting 96 65 - 99 mg/dL    Calcium 9 0 8 4 - 10 2 mg/dL    AST 19 13 - 39 U/L    ALT 25 7 - 52 U/L    Alkaline Phosphatase 62 34 - 104 U/L    Total Protein 7 2 6 4 - 8 4 g/dL    Albumin 3 9 3 5 - 5 0 g/dL    Total Bilirubin 0 63 0 20 - 1 00 mg/dL    eGFR 67 ml/min/1 73sq m       Orders Placed This Encounter   Procedures   • Comprehensive metabolic panel   • Uric acid   • CBC and differential   • Lipid Panel with Direct LDL reflex   • PSA, Total Screen   • TSH, 3rd generation with Free T4 reflex   • HEMOGLOBIN A1C W/ EAG ESTIMATION       ALLERGIES:  Allergies   Allergen Reactions   • Humira [Adalimumab] Rash       Current Medications     Current Outpatient Medications   Medication Sig Dispense Refill   • allopurinol (ZYLOPRIM) 100 mg tablet Take 1 tablet (100 mg total) by mouth daily 30 tablet 5   • colchicine (COLCRYS) 0 6 mg tablet Take 1 2 mg (2 tablets) take 1 2 mg once, then 0 6 mg (1 tablet) one hour later  3 tablet 0   • losartan (COZAAR) 50 mg tablet Take 1 tablet (50 mg total) by mouth daily As directed 30 tablet 5   • mesalamine (ASACOL) 800 MG EC tablet TAKE TWO TABLETS BY MOUTH THREE TIMES A  tablet 5   • ustekinumab (STELARA) 90 mg/mL subcutaneous injection Inject 1 mL (90 mg total) under the skin every 56 days for 1 dose 1 mL 6   • pantoprazole (PROTONIX) 40 mg tablet Take 1 tablet (40 mg total) by mouth 2 (two) times a day before meals 180 tablet 1   • triamcinolone (KENALOG) 0 1 % ointment aPPLY sparingly two to four times a day for Up to 2 weeks  (Patient not taking: Reported on 10/16/2022) 80 g 0     No current facility-administered medications for this visit           Health Maintenance     Health Maintenance   Topic Date Due   • COVID-19 Vaccine (4 - Booster for Pfizer series) 02/23/2022   • BMI: Followup Plan  05/13/2022   • Annual Physical  05/13/2022   • Influenza Vaccine (1) 09/01/2022   • Colorectal Cancer Screening  01/13/2024   • Depression Screening  01/26/2024   • BMI: Adult  01/26/2024   • DTaP,Tdap,and Td Vaccines (2 - Td or Tdap) 06/09/2026   • HIV Screening  Completed   • Hepatitis C Screening  Completed   • Pneumococcal Vaccine: Pediatrics (0 to 5 Years) and At-Risk Patients (6 to 59 Years)  Aged Out   • HIB Vaccine  Aged Out   • IPV Vaccine  Aged Out   • Hepatitis A Vaccine  Aged Out   • Meningococcal ACWY Vaccine  Aged Out   • HPV Vaccine  Aged [employer] History   Administered Date(s) Administered   • COVID-19 PFIZER VACCINE 0 3 ML IM 04/19/2021, 05/14/2021, 12/29/2021   • INFLUENZA 12/22/2016, 12/21/2017, 12/03/2018   • Influenza Quadrivalent Preservative Free 3 years and older IM 11/20/2014, 12/03/2015, 12/22/2016, 12/21/2017   • Influenza, recombinant, quadrivalent,injectable, preservative free 12/03/2018   • Pneumococcal Polysaccharide PPV23 05/22/2014   • Tdap 06/09/2016     BMI Counseling: Body mass index is 32 8 kg/m²  The BMI is above normal  Nutrition recommendations include decreasing portion sizes, encouraging healthy choices of fruits and vegetables, decreasing fast food intake, consuming healthier snacks, limiting drinks that contain sugar, moderation in carbohydrate intake, increasing intake of lean protein, reducing intake of saturated and trans fat and reducing intake of cholesterol  Exercise recommendations include moderate physical activity 150 minutes/week, exercising 3-5 times per week and strength training exercises  No pharmacotherapy was ordered  Rationale for BMI follow-up plan is due to patient being overweight or obese           JAMES Lazo

## 2023-01-26 NOTE — TELEPHONE ENCOUNTER
Regarding: Gout in Left Foot  ----- Message from Lashonda Delgado sent at 1/26/2023  9:10 AM EST -----  "I am having an episode with gout in my left foot   I can hardly walk on it and it's swollen "

## 2023-02-01 ENCOUNTER — NURSE TRIAGE (OUTPATIENT)
Dept: OTHER | Facility: OTHER | Age: 58
End: 2023-02-01

## 2023-02-01 NOTE — TELEPHONE ENCOUNTER
Dr Fidel Zafar recommends evaluation by derm prior to stopping stelara  Please ensure patient aware, thank you!

## 2023-02-01 NOTE — TELEPHONE ENCOUNTER
Reason for Disposition  • Localized rash present > 7 days    Answer Assessment - Initial Assessment Questions  1  APPEARANCE of RASH: "Describe the rash "       Patchy dry patches  Redness   2  LOCATION: "Where is the rash located?"       Back   3  NUMBER: "How many spots are there?"       Unsure   4  SIZE: "How big are the spots?" (Inches, centimeters or compare to size of a coin)      Patches on back   5  ONSET: "When did the rash start?"       Sx's for over a month now  3rd injection 4   6  ITCHING: "Does the rash itch?" If Yes, ask: "How bad is the itch?"  (Scale 1-10; or mild, moderate, severe)     Yes, Moderate itching  Keeps pt up at night  7  PAIN: "Does the rash hurt?" If Yes, ask: "How bad is the pain?"  (Scale 1-10; or mild, moderate, severe)      Denies  8  OTHER SYMPTOMS: "Do you have any other symptoms?" (e g , fever)      Denies      Protocols used: RASH OR REDNESS - LOCALIZED-ADULT-OH

## 2023-02-01 NOTE — TELEPHONE ENCOUNTER
Regarding: rash  ----- Message from [facility] sent at 2/1/2023 10:39 AM EST -----  "I have a rash on my back "

## 2023-02-01 NOTE — TELEPHONE ENCOUNTER
Pt called in stating he started with a rash on his back over a month ago that isn't going away  Pt believes it is due to the Stelara and wanted a refill on his Kenalog cream  Kenalog cream was Rx'ed to him by Derm for a rash on his arms   Pt made aware I would let Dr Catia Ambriz Know that he believes the rash is due to the [facility] but that he would have to contact DERM or his PCP for a refill on his Kenalog cream

## 2023-02-01 NOTE — TELEPHONE ENCOUNTER
Agree with recommendations, would recommended patient reach out to derm via mychart or phone call   Will also forward to Dr Krupa Perez regarding continuing/holding Stelara

## 2023-02-01 NOTE — TELEPHONE ENCOUNTER
Called patient, reached voicemail, left message to call back  Patient had a rash after humira injection back in April 2022  and was seen at dermatologist  Per Derm notes was atopic dermatitis  I called patient to see if rash was the same as last time and to follow up with dermatology to see rash  Also wanted to ask him when his last stelera injection was/ how long he has been on it  Will try calling patient again later

## 2023-02-01 NOTE — TELEPHONE ENCOUNTER
Spoke with patient, reports noticing itching/ blotchy rash on back and hips for a few months now but thought it would go away  He has had this in the past when he was on humira but rash was on his legs and arms only  He has been on stelara for a few months now and last injection was 4 days ago  He is unsure when the rash showed up in relation to starting stelara  Denies any other symptoms  I advised him to call his dermatologist for recommendations and medication     I provided him with derm number and physician name he saw back in April 2022   Please advise if patient should stop stelara or be evaluated by derm first?

## 2023-02-02 NOTE — TELEPHONE ENCOUNTER
Called patient, reached voicemail, left message with recs  Advised to call back if he has any questions

## 2023-02-03 ENCOUNTER — OFFICE VISIT (OUTPATIENT)
Dept: DERMATOLOGY | Facility: CLINIC | Age: 58
End: 2023-02-03

## 2023-02-03 VITALS — HEIGHT: 67 IN | BODY MASS INDEX: 33.12 KG/M2 | TEMPERATURE: 97.3 F | WEIGHT: 211 LBS

## 2023-02-03 DIAGNOSIS — L73.9 FOLLICULITIS: Primary | ICD-10-CM

## 2023-02-03 NOTE — PATIENT INSTRUCTIONS
FOLLICULITIS    Assessment and Plan:  Based on a thorough discussion of this condition and the management approach to it (including a comprehensive discussion of the known risks, side effects and potential benefits of treatment), the patient (family) agrees to implement the following specific plan:  Apply triamcinolone 0 1% ointment to affected areas for 2 weeks, then must stop for 1 week, then can continue if needed  Apply Mupirocin 2% ointment to affected inflamed areas 3 times a day for 7 days  Apply moisturizing creams daily   Use Dove body wash or bar soap or [employer] wash  Discontinue scented bar soaps (ex: Dial)     What is folliculitis? Folliculitis is the name given to a group of skin conditions in which there are inflamed hair follicles  The result is a tender red spot, often with a surface pustule  Folliculitis may be superficial or deep  It can affect anywhere there are hairs, including chest, back, buttocks, arms and legs  Acne and its variants are also types of folliculitis  What causes folliculitis? Folliculitis can be due to infection, occlusion (blockage), irritation and various skin diseases  Folliculitis due to infection  To determine if folliculitis is due to an infection, swabs should be taken from the pustules for cytology and culture in the laboratory  Bacteria  Bacterial folliculitis is commonly due to Staphylococcus aureus  If the infection involves the deep part of the follicle, it results in a painful boil  Recommended treatment includes careful hygiene, antiseptic cleanser or cream, antibiotic ointment, and/or oral antibiotics  Spa pool folliculitis is due to infection with Pseudomonas aeruginosa, which thrives in inadequately chlorinated warm water  Gram negative folliculitis is a pustular facial eruption also due to infection with Pseudomonas aeruginosa or other similar organisms   When it appears, it usually follows tetracycline treatment of acne, but is quite rare     Yeasts  The most common yeast to cause a folliculitis is Pityrosporum ovale, also known as Malassezia  Malassezia folliculitis (Pityrosporum folliculitis) is an itchy acne-like condition usually affecting the upper trunk of a young adult  Treatment includes avoiding moisturisers, stopping any antibiotics and topical antifungal or oral antifungal medication for several weeks  Candida albicans can also provoke a folliculitis in skin folds (intertrigo) or in the beard area  It is treated with topical or oral antifungal agents  Fungi  Ringworm of the scalp (tinea capitis) usually results in scaling and hair loss, but sometimes results in folliculitis  In [place], cat ringworm (Microsporum canis) is the commonest organism causing scalp fungal infection  Other fungi such as Trichophyton tonsurans are increasingly reported  Treatment is with oral antifungal agents for several months  Viral infections  Folliculitis may caused by herpes simplex virus  This tends to be tender, and resolves without treatment in around 10 days  Severe recurrent attacks may be treated with acyclovir and other antiviral agents  Herpes zoster (the cause of shingles) may also present as folliculitis with painful pustules and crusted spots within a dermatome (an area of skin supplied by a single nerve)  It is treated with hihg-dose acyclovir  Molluscum contagiosum, common in young children, may also cause follicular umbilicated papules, usually clustered in and around a body fold  Molluscum may provoke dermatitis  Parasitic infection  Folliculitis on the face or scalp of older or immunosuppressed adults may be due to colonisation by hair follicle mites (demodex)  This is known as demodicosis  The human infestation, scabies, often provokes folliculitis, as well as non-follicular papules, vesicles and pustules      Folliculitis due to irritation from regrowing hairs  Folliculitis may arise as hairs regrow after shaving, waxing, electrolysis or plucking  Swabs taken from the pustules are sterile ie there is no growth of bacteria or other organisms  In the beard area irritant folliculitis is known as pseudofolliculitis barbae  Irritant folliculitis is also common on the lower legs of women (shaving rash)  It is frequently very itchy  Treatment is by stopping hair removal, and not beginning again for about three months after the folliculitis has settled  To prevent reoccurring irritant folliculitis, use a gentle hair removal method, such as a lady's electric razor  Avoid soap and apply plenty of shaving gel, if using a blade shaver  Folliculitis due to contact reactions  Occlusion  Paraffin-based ointments, moisturisers, and adhesive plasters may all result in a sterile folliculitis  If a moisturiser is needed, choose an oil-free product, as it is less likely to cause occlusion  Chemicals  Coal tar, cutting oils and other chemicals may cause an irritant folliculitis  Avoid contact with the causative product  Topical steroids  Overuse of topical steroids may produce a folliculitis  Perioral dermatitis is a facial folliculitis provoked by moisturisers and topical steroids  Perioral dermatitis is treated with tetracycline antibiotics for six weeks or so  Folliculitis due to immunosuppression  Eosinophilic folliculitis is a specific type of folliculitis that may arise in some immune suppressed individuals such as those infected by human immunodeficiency virus (HIV) or those who have cancer  Folliculitis due to drugs  Folliculitis may be due to drugs, particularly corticosteroids (steroid acne), androgens (male hormones), ACTH, lithium, isoniazid (INH), phenytoin and B-complex vitamins  Protein kinase inhibitors (epidermal growth factor receptor inhibitors) and targeted therapy for metastatic melanoma (vemurafenib, dabrafenib) nearly always result in folliculitis      Folliculitis due to inflammatory skin diseases  Certain uncommon inflammatory skin diseases may cause permanent hair loss and scarring because of deep seated sterile folliculitis  These include:  Lichen planus  Discoid lupus erythematosus  Folliculitis decalvans  Folliculitis keloidalis     Treatment depends on the underlying condition and its severity  A skin biopsy is often necessary to establish the diagnosis  Acne variants   Acne and acne-like or acneform disorders are also forms of folliculitis  These include:  Acne vulgaris  Nodulocystic acne  Rosacea  Scalp folliculitis  Chloracne    The follicular occlusion syndrome refers to:  Hidradenitis suppurativa (acne inversa)  Acne conglobata (a severe form of nodulocystic acne)  Dissecting cellulitis (perifolliculitis capitis abscedens et suffodiens)  Pilonidal sinus  Treatment of the acne variants may include topical therapy as well as long courses of tetracycline antibiotics, isotretinoin (vitamin-A derivative) and in women, antiandrogenic therapy  Buttock folliculitis  Folliculitis affecting the buttocks is quite common and is often nonspecific, ie no specific cause is found  Buttock folliculitis is equally common in males and females  Acute buttock folliculitis is usually bacterial in origin (like boils), resulting in red painful papules and pustules  It clears with antibiotics  Chronic buttock folliculitis does not often cause significant symptoms but it can be very persistent  Although antiseptics, topical acne treatments, peeling agents such as alphahydroxy acids, long courses of oral antibiotics and isotretinoin can help buttock folliculitis, they are not always effective  Hair removal might be worth trying if the affected area is hairy   As regrowth of hair can make it worse, permanent hair reduction by laser or intense pulsed light (IPL) is best

## 2023-02-03 NOTE — PROGRESS NOTES
[facility] Note     Patient Name: Rajni Britton  Encounter Date: 2/3/23    Have you been cared for by a [place] [address] Dermatologist in the last 3 years and, if so, which description applies to you? Yes  I have been here within the last 3 years, and my medical history has NOT changed since that time  I am MALE/not capable of bearing children  REVIEW OF SYSTEMS:  Have you recently had or currently have any of the following? · No changes in my recent health  PAST MEDICAL HISTORY:  Have you personally ever had or currently have any of the following? If "YES," then please provide more detail  · No changes in my medical history  FAMILY HISTORY:  Any "first degree relatives" (parent, brother, sister, or child) with the following? • No changes in my family's known health  PATIENT EXPERIENCE:    • Do you want the Dermatologist to perform a COMPLETE skin exam today including a clinical examination under the "bra and underwear" areas? NO  • If necessary, do we have your permission to call and leave a detailed message on your Preferred Phone number that includes your specific medical information? Yes      Allergies   Allergen Reactions   • Humira [Adalimumab] Rash      Current Outpatient Medications:   •  allopurinol (ZYLOPRIM) 100 mg tablet, Take 1 tablet (100 mg total) by mouth daily, Disp: 30 tablet, Rfl: 5  •  losartan (COZAAR) 50 mg tablet, Take 1 tablet (50 mg total) by mouth daily As directed, Disp: 30 tablet, Rfl: 5  •  mesalamine (ASACOL) 800 MG EC tablet, TAKE TWO TABLETS BY MOUTH THREE TIMES A DAY, Disp: 180 tablet, Rfl: 5  •  colchicine (COLCRYS) 0 6 mg tablet, Take 1 2 mg (2 tablets) take 1 2 mg once, then 0 6 mg (1 tablet) one hour later  , Disp: 3 tablet, Rfl: 0  •  pantoprazole (PROTONIX) 40 mg tablet, Take 1 tablet (40 mg total) by mouth 2 (two) times a day before meals, Disp: 180 tablet, Rfl: 1  •  ustekinumab (STELARA) 90 mg/mL subcutaneous injection, Inject 1 mL (90 mg total) under the skin every 56 days for 1 dose, Disp: 1 mL, Rfl: 6          • Whom besides the patient is providing clinical information about today's encounter?   o NO ADDITIONAL HISTORIAN (patient alone provided history)    Physical Exam and Assessment/Plan by Diagnosis:    FOLLICULITIS    Physical Exam:  • Anatomic Location Affected:  trunk  • Morphological Description:  diffused eyrthematous patching   • Pertinent Positives:  • Pertinent Negatives: Additional History of Present Condition:  Patient states germania  He has had a rash for about 4 months  Patient states that he started medication Stelara (injections) & believes it may be from the medication  Patient was last seen in April 2022 with a rash that was believed to be from Medication Hurmera  Patient is no longer taking the medication  Patient was given triamcinolone 0 1% ointment in April & states that it did help with rash  Assessment and Plan:  Based on a thorough discussion of this condition and the management approach to it (including a comprehensive discussion of the known risks, side effects and potential benefits of treatment), the patient (family) agrees to implement the following specific plan:  • Apply triamcinolone 0 1% ointment to affected areas for 2 weeks, then must stop for 1 week, then can continue if needed  • Apply Mupirocin 2% ointment to affected inflamed areas 3 times a day for 7 days  • Apply moisturizing creams daily   • Use Dove body wash or bar soap or [employer] wash  Discontinue scented bar soaps (ex: Dial)     What is folliculitis? Folliculitis is the name given to a group of skin conditions in which there are inflamed hair follicles  The result is a tender red spot, often with a surface pustule  Folliculitis may be superficial or deep  It can affect anywhere there are hairs, including chest, back, buttocks, arms and legs  Acne and its variants are also types of folliculitis      What causes folliculitis? Folliculitis can be due to infection, occlusion (blockage), irritation and various skin diseases  Folliculitis due to infection  To determine if folliculitis is due to an infection, swabs should be taken from the pustules for cytology and culture in the laboratory  Bacteria  Bacterial folliculitis is commonly due to Staphylococcus aureus  If the infection involves the deep part of the follicle, it results in a painful boil  Recommended treatment includes careful hygiene, antiseptic cleanser or cream, antibiotic ointment, and/or oral antibiotics  Spa pool folliculitis is due to infection with Pseudomonas aeruginosa, which thrives in inadequately chlorinated warm water  Gram negative folliculitis is a pustular facial eruption also due to infection with Pseudomonas aeruginosa or other similar organisms  When it appears, it usually follows tetracycline treatment of acne, but is quite rare  Yeasts  The most common yeast to cause a folliculitis is Pityrosporum ovale, also known as Malassezia  Malassezia folliculitis (Pityrosporum folliculitis) is an itchy acne-like condition usually affecting the upper trunk of a young adult  Treatment includes avoiding moisturisers, stopping any antibiotics and topical antifungal or oral antifungal medication for several weeks  Candida albicans can also provoke a folliculitis in skin folds (intertrigo) or in the beard area  It is treated with topical or oral antifungal agents  Fungi  Ringworm of the scalp (tinea capitis) usually results in scaling and hair loss, but sometimes results in folliculitis  In [place], cat ringworm (Microsporum canis) is the commonest organism causing scalp fungal infection  Other fungi such as Trichophyton tonsurans are increasingly reported  Treatment is with oral antifungal agents for several months  Viral infections  Folliculitis may caused by herpes simplex virus   This tends to be tender, and resolves without treatment in around 10 days  Severe recurrent attacks may be treated with acyclovir and other antiviral agents  Herpes zoster (the cause of shingles) may also present as folliculitis with painful pustules and crusted spots within a dermatome (an area of skin supplied by a single nerve)  It is treated with hihg-dose acyclovir  Molluscum contagiosum, common in young children, may also cause follicular umbilicated papules, usually clustered in and around a body fold  Molluscum may provoke dermatitis  Parasitic infection  Folliculitis on the face or scalp of older or immunosuppressed adults may be due to colonisation by hair follicle mites (demodex)  This is known as demodicosis  The human infestation, scabies, often provokes folliculitis, as well as non-follicular papules, vesicles and pustules  Folliculitis due to irritation from regrowing hairs  Folliculitis may arise as hairs regrow after shaving, waxing, electrolysis or plucking  Swabs taken from the pustules are sterile ie there is no growth of bacteria or other organisms  In the beard area irritant folliculitis is known as pseudofolliculitis barbae  Irritant folliculitis is also common on the lower legs of women (shaving rash)  It is frequently very itchy  Treatment is by stopping hair removal, and not beginning again for about three months after the folliculitis has settled  To prevent reoccurring irritant folliculitis, use a gentle hair removal method, such as a lady's electric razor  Avoid soap and apply plenty of shaving gel, if using a blade shaver  Folliculitis due to contact reactions  Occlusion  Paraffin-based ointments, moisturisers, and adhesive plasters may all result in a sterile folliculitis  If a moisturiser is needed, choose an oil-free product, as it is less likely to cause occlusion  Chemicals  Coal tar, cutting oils and other chemicals may cause an irritant folliculitis  Avoid contact with the causative product      Topical steroids  Overuse of topical steroids may produce a folliculitis  Perioral dermatitis is a facial folliculitis provoked by moisturisers and topical steroids  Perioral dermatitis is treated with tetracycline antibiotics for six weeks or so  Folliculitis due to immunosuppression  Eosinophilic folliculitis is a specific type of folliculitis that may arise in some immune suppressed individuals such as those infected by human immunodeficiency virus (HIV) or those who have cancer  Folliculitis due to drugs  Folliculitis may be due to drugs, particularly corticosteroids (steroid acne), androgens (male hormones), ACTH, lithium, isoniazid (INH), phenytoin and B-complex vitamins  Protein kinase inhibitors (epidermal growth factor receptor inhibitors) and targeted therapy for metastatic melanoma (vemurafenib, dabrafenib) nearly always result in folliculitis  Folliculitis due to inflammatory skin diseases  Certain uncommon inflammatory skin diseases may cause permanent hair loss and scarring because of deep seated sterile folliculitis  These include:  • Lichen planus  • Discoid lupus erythematosus  • Folliculitis decalvans  • Folliculitis keloidalis     Treatment depends on the underlying condition and its severity  A skin biopsy is often necessary to establish the diagnosis  Acne variants   Acne and acne-like or acneform disorders are also forms of folliculitis  These include:  • Acne vulgaris  • Nodulocystic acne  • Rosacea  • Scalp folliculitis  • Chloracne    The follicular occlusion syndrome refers to:  • Hidradenitis suppurativa (acne inversa)  • Acne conglobata (a severe form of nodulocystic acne)  • Dissecting cellulitis (perifolliculitis capitis abscedens et suffodiens)  • Pilonidal sinus  Treatment of the acne variants may include topical therapy as well as long courses of tetracycline antibiotics, isotretinoin (vitamin-A derivative) and in women, antiandrogenic therapy      Buttock folliculitis  Folliculitis affecting the buttocks is quite common and is often nonspecific, ie no specific cause is found  Buttock folliculitis is equally common in males and females  • Acute buttock folliculitis is usually bacterial in origin (like boils), resulting in red painful papules and pustules  It clears with antibiotics  • Chronic buttock folliculitis does not often cause significant symptoms but it can be very persistent  Although antiseptics, topical acne treatments, peeling agents such as alphahydroxy acids, long courses of oral antibiotics and isotretinoin can help buttock folliculitis, they are not always effective  Hair removal might be worth trying if the affected area is hairy   As regrowth of hair can make it worse, permanent hair reduction by laser or intense pulsed light (IPL) is best     Scribe Attestation    I,:  Hong Shore am acting as a scribe while in the presence of the attending physician :       I,:  Gino Cisneros MD personally performed the services described in this documentation    as scribed in my presence :         Seble Becerra MD

## 2023-02-06 ENCOUNTER — TELEPHONE (OUTPATIENT)
Dept: DERMATOLOGY | Age: 58
End: 2023-02-06

## 2023-02-06 NOTE — TELEPHONE ENCOUNTER
Patient called requesting a call back regarding his prescription (Triamcinolone 0 1 % ointment , Mupirocin2 % ointment ) he had an appointment on 02 03 2021

## 2023-02-07 NOTE — TELEPHONE ENCOUNTER
Pt called and said he is still waiting for both his medications to be sent to the [facility] pharmacy on his chart  Looks like pt saw Dr Igor Brito at the [facility] office on the 3rd  Dr Igor Brito is out of office and won't be back until tomorrow 2/8/23  The medications listed on AVS are triamcinolone 0 1% ointment and Mupirocin 2% ointment  pt asked for a c/b when the medications get sent to pharmacy   Thank you

## 2023-02-08 DIAGNOSIS — L30.9 DERMATITIS: Primary | ICD-10-CM

## 2023-02-08 NOTE — TELEPHONE ENCOUNTER
Pt is calling to state that his medications from apt 2/3/23 are not at the pharmacy  I do see that the office note has not been signed off (I believe that is why medications weren't sent to pharmacy)  I see dr Aleda Prader signed order, but I don't see medications sent to pharmacy  If you would kindly review his office note/sign off so medications can be processed, that would be greatly appreciated  If not, please resend medications to pharmacy for pt  Thank you!

## 2023-02-15 DIAGNOSIS — K51.80 OTHER ULCERATIVE COLITIS WITHOUT COMPLICATION (HCC): ICD-10-CM

## 2023-02-15 RX ORDER — MESALAMINE 800 MG/1
TABLET, DELAYED RELEASE ORAL
Qty: 180 TABLET | Refills: 5 | Status: SHIPPED | OUTPATIENT
Start: 2023-02-15

## 2023-02-22 ENCOUNTER — TELEPHONE (OUTPATIENT)
Dept: GASTROENTEROLOGY | Facility: CLINIC | Age: 58
End: 2023-02-22

## 2023-03-07 DIAGNOSIS — L30.9 DERMATITIS: ICD-10-CM

## 2023-04-06 DIAGNOSIS — L73.9 FOLLICULITIS: ICD-10-CM

## 2023-06-08 DIAGNOSIS — L73.9 FOLLICULITIS: ICD-10-CM

## 2023-07-13 DIAGNOSIS — M10.9 ACUTE GOUT INVOLVING TOE OF LEFT FOOT, UNSPECIFIED CAUSE: ICD-10-CM

## 2023-07-13 DIAGNOSIS — I10 ESSENTIAL HYPERTENSION: ICD-10-CM

## 2023-07-13 RX ORDER — LOSARTAN POTASSIUM 50 MG/1
TABLET ORAL
Qty: 30 TABLET | Refills: 5 | Status: SHIPPED | OUTPATIENT
Start: 2023-07-13

## 2023-07-13 RX ORDER — ALLOPURINOL 100 MG/1
TABLET ORAL
Qty: 30 TABLET | Refills: 5 | Status: SHIPPED | OUTPATIENT
Start: 2023-07-13

## 2023-07-27 ENCOUNTER — TELEPHONE (OUTPATIENT)
Dept: GASTROENTEROLOGY | Facility: CLINIC | Age: 58
End: 2023-07-27

## 2023-07-27 NOTE — TELEPHONE ENCOUNTER
7/27 Pts jaeh for stelara stared on CMM. Ron Cable: [id number] - PA Case ID: [id number]    Pt needs an appt for follow up. Has not been seen in a year. Please call to make fup with in next month to ensure he can continue getting meds.  ty

## 2023-07-31 NOTE — TELEPHONE ENCOUNTER
7/31 César Murray was denied due to lack of office note, mychart message sent to pt explaining this and the need for an appt ASAP.

## 2023-08-02 DIAGNOSIS — L73.9 FOLLICULITIS: ICD-10-CM

## 2023-08-02 DIAGNOSIS — L30.9 DERMATITIS: ICD-10-CM

## 2023-08-02 NOTE — TELEPHONE ENCOUNTER
Patient left voice mail message on  8/2/23 8:53 am requesting refill for Triamcinolone 0.1% ointment to [address] [place] [address] in [facility]

## 2023-08-04 NOTE — TELEPHONE ENCOUNTER
Left message for patient informing Dr is requesting an appointment be scheduled and declined the refill for Triamcinolone.

## 2023-08-24 ENCOUNTER — TELEPHONE (OUTPATIENT)
Dept: GASTROENTEROLOGY | Facility: CLINIC | Age: 58
End: 2023-08-24

## 2023-08-24 ENCOUNTER — OFFICE VISIT (OUTPATIENT)
Dept: GASTROENTEROLOGY | Facility: AMBULARY SURGERY CENTER | Age: 58
End: 2023-08-24
Payer: COMMERCIAL

## 2023-08-24 VITALS
OXYGEN SATURATION: 98 % | SYSTOLIC BLOOD PRESSURE: 150 MMHG | DIASTOLIC BLOOD PRESSURE: 94 MMHG | BODY MASS INDEX: 32.24 KG/M2 | HEIGHT: 67 IN | HEART RATE: 68 BPM | WEIGHT: 205.4 LBS

## 2023-08-24 DIAGNOSIS — K51.019 ULCERATIVE PANCOLITIS WITH COMPLICATION (HCC): Primary | ICD-10-CM

## 2023-08-24 DIAGNOSIS — D12.6 DYSPLASIA OF COLON: ICD-10-CM

## 2023-08-24 PROCEDURE — 99214 OFFICE O/P EST MOD 30 MIN: CPT | Performed by: PHYSICIAN ASSISTANT

## 2023-08-24 NOTE — PROGRESS NOTES
[facility] - Outpatient Follow-up Note  Bre Livingston 60 y.o. male MRN: [id number]  Encounter: [id number]          ASSESSMENT AND PLAN:      59-year-old male with history of ulcerative pancolitis transition to a biologic last year however developed dermatitis with Humira therefore transition to [address] [place] [address] Road. [address]  Ulcerative pancolitis  Patient developed rash after Stelara. He went to dermatologist and was diagnosed with folliculitis however reports the creams did not help. He therefore stopped Stelara due to the rash starting after beginning this medicine. He did not take his last dose of Stelara. He denies any diarrhea or blood in stool currently. -Due to patient's previous biopsy showing indefinite dysplasia as well as mild colitis in the sigmoid, he was recommended to start a biologic 1/2022. He failed reaction with Humira and now due to rash does not want to continue Stelara. - I discussed alternative options such as Murl Remy or Entyvio. His preference at this time would be Murl Remy. This was discussed at last office visit as well when he decided on Stelara. - She will continue mesalamine at this time. - I will send message to try to get Murl Remy approved. -Recommend routine lab work including CBC, CMP, CRP, fecal calprotectin as well as TB testing and hepatitis B testing. He reports he may not obtain stool testing.    -I recommended that patient call dermatologist to get a follow-up as this may take several months.    -Patient reach out to us with any development of diarrhea, blood in the stool in the meantime.    -Patient will be due for colonoscopy 1/2024. He is aware of this. We will follow-up closely in 2 months and consider setting up at that time. Follow-up in 2 months.   Patient agreeable to this follow-up.  ______________________________________________________________________    SUBJECTIVE:      Bre Livingston is a 59-year-old male with history of ulcerative colitis on Stelara for around the past year who presents the office for follow-up. Patient was last in the office over a year ago. At that time was recommended he start Stelara due to dermatitis after starting Humira. He did develop a rash and followed up with a dermatologist and was diagnosed with folliculitis. He was prescribed creams however reports these did not help. Due to correlation with starting Stelara he had since stopped taking this and did not take his last injection. He reports he is doing well. He denies any diarrhea or blood in the stool. No abdominal pain. Occasional bloating. He reports still taking his mesalamine agent. Last colonoscopy 1/2022 showing inflammation of the sigmoid and biopsy showing indefinite for dysplasia which is when he was transition to a biologic. Repeat colonoscopy recommended in 2 years. REVIEW OF SYSTEMS IS OTHERWISE NEGATIVE. Historical Information   Past Medical History:   Diagnosis Date   • Colitis    • Hypertension    • Sleep apnea      Past Surgical History:   Procedure Laterality Date   • CARDIAC CATHETERIZATION     • COLONOSCOPY     • LA COLONOSCOPY FLX DX W/COLLJ SPEC WHEN PFRMD N/A 4/25/2016    Procedure: COLONOSCOPY;  Surgeon: Norman Springer MD;  Location: [facility]; Service: Gastroenterology   • LA SIGMOIDOSCOPY FLX DX W/COLLJ SPEC BR/WA IF PFRMD N/A 12/4/2018    Procedure: Bj Bernabe;  Surgeon: Rosa Diaz MD;  Location: [facility];   Service: Gastroenterology     Social History   Social History     Substance and Sexual Activity   Alcohol Use Yes    Comment: 2 per month     Social History     Substance and Sexual Activity   Drug Use No     Social History     Tobacco Use   Smoking Status Former   Smokeless Tobacco Never   Tobacco Comments    QUIT 2018     Family History   Problem Relation Age of Onset   • Diabetes Mother    • Heart disease Father         Cardiac Disorder   • Heart disease Brother         Cardiac Disorder   • Eczema Sister        Meds/Allergies       Current Outpatient Medications:   •  losartan (COZAAR) 50 mg tablet  •  mesalamine (ASACOL) 800 MG EC tablet  •  allopurinol (ZYLOPRIM) 100 mg tablet  •  colchicine (COLCRYS) 0.6 mg tablet  •  mupirocin (BACTROBAN) 2 % ointment  •  mupirocin (BACTROBAN) 2 % ointment  •  pantoprazole (PROTONIX) 40 mg tablet  •  triamcinolone (KENALOG) 0.1 % ointment  •  triamcinolone (KENALOG) 0.1 % ointment  •  ustekinumab (STELARA) 90 mg/mL subcutaneous injection    Allergies   Allergen Reactions   • Humira [Adalimumab] Rash           Objective     Blood pressure 150/94, pulse 68, height 5' 7" (1.702 m), weight 93.2 kg (205 lb 6.4 oz), SpO2 98 %. Body mass index is 32.17 kg/m². PHYSICAL EXAM:      General Appearance:   Alert, cooperative, no distress   HEENT:   Normocephalic, atraumatic, anicteric.     Neck:  Supple, symmetrical, trachea midline   Lungs:   Clear to auscultation bilaterally; no rales, rhonchi or wheezing; respirations unlabored    Heart[de-identified]   Regular rate and rhythm; no murmur, rub, or gallop. Abdomen:   Soft, non-tender, non-distended; normal bowel sounds; no masses, no organomegaly    Genitalia:   Deferred    Rectal:   Deferred    Extremities:  No cyanosis, clubbing or edema    Pulses:  2+ and symmetric    Skin:  No jaundice, rashes, or lesions    Lymph nodes:  No palpable cervical lymphadenopathy        Lab Results:   No visits with results within 1 Day(s) from this visit.    Latest known visit with results is:   Appointment on 10/14/2022   Component Date Value   • CRP 10/14/2022 11.7 (H)    • WBC 10/14/2022 7.80    • RBC 10/14/2022 4.92    • Hemoglobin 10/14/2022 15.1    • Hematocrit 10/14/2022 44.5    • MCV 10/14/2022 90    • MCH 10/14/2022 30.7    • MCHC 10/14/2022 33.9    • RDW 10/14/2022 12.5    • MPV 10/14/2022 9.2    • Platelets 62/25/8184 298    • nRBC 10/14/2022 0    • Neutrophils Relative 10/14/2022 56    • Immat GRANS % 10/14/2022 0    • Lymphocytes Relative 10/14/2022 29    • Monocytes Relative 10/14/2022 10    • Eosinophils Relative 10/14/2022 4    • Basophils Relative 10/14/2022 1    • Neutrophils Absolute 10/14/2022 4.41    • Immature Grans Absolute 10/14/2022 0.02    • Lymphocytes Absolute 10/14/2022 2.27    • Monocytes Absolute 10/14/2022 0.78    • Eosinophils Absolute 10/14/2022 0.27    • Basophils Absolute 10/14/2022 0.05    • Sodium 10/14/2022 139    • Potassium 10/14/2022 4.0    • Chloride 10/14/2022 105    • CO2 10/14/2022 28    • ANION GAP 10/14/2022 6    • BUN 10/14/2022 16    • Creatinine 10/14/2022 1.18    • Glucose, Fasting 10/14/2022 96    • Calcium 10/14/2022 9.0    • AST 10/14/2022 19    • ALT 10/14/2022 25    • Alkaline Phosphatase 10/14/2022 62    • Total Protein 10/14/2022 7.2    • Albumin 10/14/2022 3.9    • Total Bilirubin 10/14/2022 0.63    • eGFR 10/14/2022 67          Radiology Results:   No results found.

## 2023-08-24 NOTE — TELEPHONE ENCOUNTER
----- Message from Samira young, [address] [facility] sent at 8/24/2023  1:46 PM EDT -----    ----- Message -----  From: Celio Calderon PA-C  Sent: 8/24/2023  12:08 PM EDT  To: Mayte Petty MD; Riazgordon Stein    Patient was previously on Stelara and developed a rash. He followed up with dermatologist and was diagnosed with folliculitis however reports the creams did not work. He believes this is from the Stelara therefore stopped taking it. He does not want to continue it. His preference at this time would be [facility]. Can we start approval for this?   Thank you

## 2023-08-29 DIAGNOSIS — K51.019 ULCERATIVE PANCOLITIS WITH COMPLICATION (HCC): Primary | ICD-10-CM

## 2023-08-29 RX ORDER — TOFACITINIB 22 MG/1
22 TABLET, FILM COATED, EXTENDED RELEASE ORAL DAILY
Qty: 56 TABLET | Refills: 0 | Status: SHIPPED | OUTPATIENT
Start: 2023-08-29

## 2023-08-29 NOTE — TELEPHONE ENCOUNTER
8/29 lmovm to pt that his med was approved and the RX was sent to [address] [place] [address].  In 6 weeks will check on next dosing  schedule

## 2023-09-01 NOTE — TELEPHONE ENCOUNTER
----- Message -----  From: Ambrosio Frederick PA-C  Sent: 9/1/2023   2:29 PM EDT  To: Gastroenterology [facility] OK to dispense 61

## 2023-09-01 NOTE — TELEPHONE ENCOUNTER
[facility] called. The [facility] come in a 30 count bottle. Was dispensed 56 tablets. Wanted to know if it was okay to dispense 60. Please call back at [phone]. Please advise.

## 2023-09-01 NOTE — TELEPHONE ENCOUNTER
I spoke with Jinny Yu from [place]. Relayed verbal order Xeljanz 22 mg by mouth daily dispense 60 tablets. Pharmacy to call patient to set up delivery.

## 2023-10-05 LAB
ALBUMIN SERPL-MCNC: 4.2 G/DL (ref 3.6–5.1)
ALBUMIN/GLOB SERPL: 1.6 (CALC) (ref 1–2.5)
ALP SERPL-CCNC: 62 U/L (ref 35–144)
ALT SERPL-CCNC: 34 U/L (ref 9–46)
AST SERPL-CCNC: 26 U/L (ref 10–35)
BASOPHILS # BLD AUTO: 41 CELLS/UL (ref 0–200)
BASOPHILS NFR BLD AUTO: 0.6 %
BILIRUB SERPL-MCNC: 0.6 MG/DL (ref 0.2–1.2)
BUN SERPL-MCNC: 17 MG/DL (ref 7–25)
BUN/CREAT SERPL: NORMAL (CALC) (ref 6–22)
CALCIUM SERPL-MCNC: 9.2 MG/DL (ref 8.6–10.3)
CHLORIDE SERPL-SCNC: 104 MMOL/L (ref 98–110)
CHOLEST SERPL-MCNC: 184 MG/DL
CHOLEST/HDLC SERPL: 3 (CALC)
CO2 SERPL-SCNC: 29 MMOL/L (ref 20–32)
CREAT SERPL-MCNC: 1.01 MG/DL (ref 0.7–1.3)
EOSINOPHIL # BLD AUTO: 331 CELLS/UL (ref 15–500)
EOSINOPHIL NFR BLD AUTO: 4.8 %
ERYTHROCYTE [DISTWIDTH] IN BLOOD BY AUTOMATED COUNT: 13.1 % (ref 11–15)
EST. AVERAGE GLUCOSE BLD GHB EST-MCNC: 126 MG/DL
EST. AVERAGE GLUCOSE BLD GHB EST-SCNC: 7 MMOL/L
GFR/BSA.PRED SERPLBLD CYS-BASED-ARV: 86 ML/MIN/1.73M2
GLOBULIN SER CALC-MCNC: 2.7 G/DL (CALC) (ref 1.9–3.7)
GLUCOSE SERPL-MCNC: 99 MG/DL (ref 65–99)
HBA1C MFR BLD: 6 % OF TOTAL HGB
HCT VFR BLD AUTO: 45 % (ref 38.5–50)
HDLC SERPL-MCNC: 61 MG/DL
HGB BLD-MCNC: 15.5 G/DL (ref 13.2–17.1)
LDLC SERPL CALC-MCNC: 105 MG/DL (CALC)
LYMPHOCYTES # BLD AUTO: 2415 CELLS/UL (ref 850–3900)
LYMPHOCYTES NFR BLD AUTO: 35 %
MCH RBC QN AUTO: 31.9 PG (ref 27–33)
MCHC RBC AUTO-ENTMCNC: 34.4 G/DL (ref 32–36)
MCV RBC AUTO: 92.6 FL (ref 80–100)
MONOCYTES # BLD AUTO: 787 CELLS/UL (ref 200–950)
MONOCYTES NFR BLD AUTO: 11.4 %
NEUTROPHILS # BLD AUTO: 3326 CELLS/UL (ref 1500–7800)
NEUTROPHILS NFR BLD AUTO: 48.2 %
NONHDLC SERPL-MCNC: 123 MG/DL (CALC)
PLATELET # BLD AUTO: 272 THOUSAND/UL (ref 140–400)
PMV BLD REES-ECKER: 9.9 FL (ref 7.5–12.5)
POTASSIUM SERPL-SCNC: 4.4 MMOL/L (ref 3.5–5.3)
PROT SERPL-MCNC: 6.9 G/DL (ref 6.1–8.1)
PSA SERPL-MCNC: 0.73 NG/ML
RBC # BLD AUTO: 4.86 MILLION/UL (ref 4.2–5.8)
SODIUM SERPL-SCNC: 140 MMOL/L (ref 135–146)
TRIGL SERPL-MCNC: 85 MG/DL
TSH SERPL-ACNC: 1.52 MIU/L (ref 0.4–4.5)
URATE SERPL-MCNC: 7 MG/DL (ref 4–8)
WBC # BLD AUTO: 6.9 THOUSAND/UL (ref 3.8–10.8)

## 2023-10-06 ENCOUNTER — PREP FOR PROCEDURE (OUTPATIENT)
Dept: GASTROENTEROLOGY | Facility: CLINIC | Age: 58
End: 2023-10-06

## 2023-10-06 ENCOUNTER — TELEPHONE (OUTPATIENT)
Dept: GASTROENTEROLOGY | Facility: CLINIC | Age: 58
End: 2023-10-06

## 2023-10-06 DIAGNOSIS — K22.70 BARRETT'S ESOPHAGUS WITHOUT DYSPLASIA: Primary | ICD-10-CM

## 2023-10-06 LAB
CRP SERPL-MCNC: 0.9 MG/L
HBV CORE AB SERPL QL IA: NORMAL
HBV SURFACE AG SERPL QL IA: NORMAL

## 2023-10-06 NOTE — TELEPHONE ENCOUNTER
Attempted to call patient again to discuss recent labs. Also saw that Angela Boxer was approved and sent to pharmacy. No answer and I am unable to leave a message.  I will continue to attempt calling patient

## 2023-10-09 ENCOUNTER — TELEPHONE (OUTPATIENT)
Dept: GASTROENTEROLOGY | Facility: CLINIC | Age: 58
End: 2023-10-09

## 2023-10-09 DIAGNOSIS — K51.019 ULCERATIVE PANCOLITIS WITH COMPLICATION (HCC): Primary | ICD-10-CM

## 2023-10-09 RX ORDER — TOFACITINIB 11 MG/1
11 TABLET, FILM COATED, EXTENDED RELEASE ORAL DAILY
Qty: 90 TABLET | Refills: 3 | Status: SHIPPED | OUTPATIENT
Start: 2023-10-09 | End: 2024-01-07

## 2023-10-09 NOTE — TELEPHONE ENCOUNTER
Scheduled date of EGD/colonoscopy (as of today): 1/26/24  Physician performing EGD/colonoscopy: Dr. Charles Browning  Location of [facility]colonoscopy[facility]  Desired bowel prep reviewed with patient: miralax w/ adalid my chart   Instructions reviewed with patient by: luis  Clearances:  N/A

## 2023-10-09 NOTE — TELEPHONE ENCOUNTER
Called patient to discuss labs, need for repeat labs, follow up on new medication without answer. Left voicemail.

## 2023-10-09 NOTE — TELEPHONE ENCOUNTER
Called and spoke to patient. Reports on Xeljanz x 1 month. Reports 1-2 BM daily, no blood. Repeat lab work placed for 2 weeks. He has office appointment with PCP this month and will likely obtain vaccines such as flu. Also recommended shingles vaccine. Unsure if this is done in the PCP office however will send message to his PCP. Patient aware of increased risk of clot with medicine and will go to the emergency room if he develops any pain in the legs, swelling of legs, shortness of breath, chest pain, etc.  He has office appointment 11/1 which he reports he will be able to attend. He will call with any questions or concerns in the meantime.

## 2023-10-09 NOTE — TELEPHONE ENCOUNTER
----- Message from Lonnie Lee PA-C sent at 10/6/2023 12:58 PM EDT -----  Please call patient for EGD due to Bar[place]ts. Order placed. Can be at [facility].  Thank you

## 2023-10-11 LAB
CRP SERPL-MCNC: 0.9 MG/L
HBV CORE AB SERPL QL IA: NORMAL
HBV SURFACE AG SERPL QL IA: NORMAL
M TB IFN-G CD4+ BCKGRND COR BLD-ACNC: 0 IU/ML
M TB IFN-G CD4+ BCKGRND COR BLD-ACNC: 0 IU/ML
M TB IFN-G CD4+ T-CELLS BLD-ACNC: 0.02 IU/ML
M TB TUBERC IFN-G BLD QL: NEGATIVE
M TB TUBERC IGNF/MITOGEN IGNF CONTROL: 7.97 IU/ML

## 2023-10-12 ENCOUNTER — TELEPHONE (OUTPATIENT)
Dept: GASTROENTEROLOGY | Facility: CLINIC | Age: 58
End: 2023-10-12

## 2023-10-12 DIAGNOSIS — K51.019 ULCERATIVE PANCOLITIS WITH COMPLICATION (HCC): Primary | ICD-10-CM

## 2023-10-12 DIAGNOSIS — E78.5 HYPERLIPIDEMIA, UNSPECIFIED HYPERLIPIDEMIA TYPE: ICD-10-CM

## 2023-10-12 NOTE — TELEPHONE ENCOUNTER
Attempted to call patient daily since message to inform patient but have not been able to get in touch. Left voicemail.  I will send [employer] message instead as he typically reviews these.  ---- Message from Charlene Garner MD sent at 10/9/2023  5:03 PM EDT -----  Viki Clemente    He has to check with his insurance to find out where is cheaper to get it- either with us or the pharmacy    Thanks  Dr Louisa Valverde

## 2023-10-19 ENCOUNTER — OFFICE VISIT (OUTPATIENT)
Dept: FAMILY MEDICINE CLINIC | Facility: CLINIC | Age: 58
End: 2023-10-19
Payer: COMMERCIAL

## 2023-10-19 VITALS
HEIGHT: 67 IN | RESPIRATION RATE: 18 BRPM | DIASTOLIC BLOOD PRESSURE: 98 MMHG | OXYGEN SATURATION: 98 % | TEMPERATURE: 98.6 F | HEART RATE: 90 BPM | BODY MASS INDEX: 33.12 KG/M2 | WEIGHT: 211 LBS | SYSTOLIC BLOOD PRESSURE: 160 MMHG

## 2023-10-19 DIAGNOSIS — K21.9 GASTROESOPHAGEAL REFLUX DISEASE WITHOUT ESOPHAGITIS: ICD-10-CM

## 2023-10-19 DIAGNOSIS — Z00.00 ANNUAL PHYSICAL EXAM: Primary | ICD-10-CM

## 2023-10-19 DIAGNOSIS — H93.13 TINNITUS OF BOTH EARS: ICD-10-CM

## 2023-10-19 DIAGNOSIS — K51.019 ULCERATIVE PANCOLITIS WITH COMPLICATION (HCC): ICD-10-CM

## 2023-10-19 DIAGNOSIS — R42 VERTIGO: ICD-10-CM

## 2023-10-19 DIAGNOSIS — Z23 ENCOUNTER FOR IMMUNIZATION: ICD-10-CM

## 2023-10-19 DIAGNOSIS — I10 ESSENTIAL HYPERTENSION: ICD-10-CM

## 2023-10-19 PROBLEM — K62.5 RECTAL BLEEDING: Status: RESOLVED | Noted: 2020-04-20 | Resolved: 2023-10-19

## 2023-10-19 PROBLEM — D12.6 DYSPLASIA OF COLON: Status: RESOLVED | Noted: 2020-07-02 | Resolved: 2023-10-19

## 2023-10-19 PROCEDURE — 99396 PREV VISIT EST AGE 40-64: CPT | Performed by: FAMILY MEDICINE

## 2023-10-19 PROCEDURE — 99214 OFFICE O/P EST MOD 30 MIN: CPT | Performed by: FAMILY MEDICINE

## 2023-10-19 PROCEDURE — 90686 IIV4 VACC NO PRSV 0.5 ML IM: CPT

## 2023-10-19 PROCEDURE — 90471 IMMUNIZATION ADMIN: CPT

## 2023-10-19 RX ORDER — LOSARTAN POTASSIUM 100 MG/1
100 TABLET ORAL DAILY
Qty: 90 TABLET | Refills: 0 | Status: SHIPPED | OUTPATIENT
Start: 2023-10-19

## 2023-10-19 RX ORDER — MECLIZINE HYDROCHLORIDE 25 MG/1
25 TABLET ORAL EVERY 8 HOURS PRN
Qty: 30 TABLET | Refills: 0 | Status: SHIPPED | OUTPATIENT
Start: 2023-10-19

## 2023-10-19 NOTE — PROGRESS NOTES
[facility]    NAME: Melany Huber  AGE: 60 y.o. 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  He is to f/u with GI   He refused to take any meds at this time            Cardiovascular and Mediastinum    Essential hypertension    Relevant Medications    losartan (COZAAR) 100 MG tablet   Other Visit Diagnoses     Annual physical exam    -  Primary    Encounter for immunization        Relevant Orders    influenza vaccine, quadrivalent, 0.5 mL, preservative-free, for adult and pediatric patients 6 mos+ (AFLURIA, FLUARIX, FLULAVAL, FLUZONE)    Vertigo        Relevant Medications    meclizine (ANTIVERT) 25 mg tablet    Other Relevant Orders    Ambulatory Referral to Physical Therapy    Tinnitus of both ears        Relevant Orders    Ambulatory Referral to Audiology    Ambulatory Referral to Audiology          Immunizations and preventive care screenings were discussed with patient today. Appropriate education was printed on patient's after visit summary. Discussed risks and benefits of prostate cancer screening. We discussed the controversial history of PSA screening for prostate cancer in the [facility] as well as the risk of over detection and over treatment of prostate cancer by way of PSA screening.   The patient understands that PSA blood testing is an imperfect way to screen for prostate cancer and that elevated PSA levels in the blood may also be caused by infection, inflammation, prostatic trauma or manipulation, urological procedures, or by benign prostatic enlargement. The role of the digital rectal examination in prostate cancer screening was also discussed and I discussed with him that there is large interobserver variability in the findings of digital rectal examination. Counseling:  Alcohol/drug use: discussed moderation in alcohol intake, the recommendations for healthy alcohol use, and avoidance of illicit drug use. Dental Health: discussed importance of regular tooth brushing, flossing, and dental visits. Injury prevention: discussed safety/seat belts, safety helmets, smoke detectors, carbon dioxide detectors, and smoking near bedding or upholstery. Sexual health: discussed sexually transmitted diseases, partner selection, use of condoms, avoidance of unintended pregnancy, and contraceptive alternatives. Exercise: the importance of regular exercise/physical activity was discussed. Recommend exercise 3-5 times per week for at least 30 minutes. BMI Counseling: Body mass index is 32.81 kg/m². The BMI is above normal. Nutrition recommendations include decreasing portion sizes and encouraging healthy choices of fruits and vegetables. Exercise recommendations include moderate physical activity 150 minutes/week. No pharmacotherapy was ordered. Rationale for BMI follow-up plan is due to patient being overweight or obese. Depression Screening and Follow-up Plan: Patient was screened for depression during today's encounter. They screened negative with a PHQ-2 score of 0. No follow-ups on file. Chief Complaint:     Chief Complaint   Patient presents with   • Physical Exam     Patient being seen for Physical Exam      History of Present Illness:     Adult Annual Physical   Patient here for a comprehensive physical exam. The patient reports ringing in his ears for the last few years. Dizziness with head movement on and off for the last 3 months      Diet and Physical Activity  Diet/Nutrition: poor diet. Exercise: no formal exercise. Depression Screening  PHQ-2/9 Depression Screening    Little interest or pleasure in doing things: 0 - not at all  Feeling down, depressed, or hopeless: 0 - not at all  PHQ-2 Score: 0  PHQ-2 Interpretation: Negative depression screen       General Health  Sleep: sleeps well. Hearing: normal - bilateral.  Vision: goes for regular eye exams. Dental: regular dental visits.  Health  Symptoms include: none    Advanced Care Planning  Do you have an advanced directive? no  Do you have a durable medical power of ? no     Review of Systems:     Review of Systems   Constitutional: Negative. HENT: Negative. Eyes: Negative. Respiratory: Negative. Cardiovascular: Negative. Gastrointestinal: Negative. Endocrine: Negative. Genitourinary: Negative. Musculoskeletal: Negative. Skin: Negative. Allergic/Immunologic: Negative. Neurological: Negative. Hematological: Negative. Psychiatric/Behavioral: Negative. Past Medical History:     Past Medical History:   Diagnosis Date   • Colitis    • Hypertension    • Sleep apnea       Past Surgical History:     Past Surgical History:   Procedure Laterality Date   • CARDIAC CATHETERIZATION     • COLONOSCOPY     • [place] COLONOSCOPY FLX DX W/COLLJ SPEC WHEN PFRMD N/A 4/25/2016    Procedure: COLONOSCOPY;  Surgeon: Chary Grady MD;  [facility]; Service: Gastroenterology   • [place] SIGMOIDOSCOPY FLX DX W/COLLJ SPEC BR/WA IF PFRMD N/A 12/4/2018    Procedure: Usha Johns;  Surgeon: Talita Duff MD;  [facility];   Service: Gastroenterology      Family History:     Family History   Problem Relation Age of Onset   • Diabetes Mother    • Heart disease Father         Cardiac Disorder   • Heart disease Brother         Cardiac Disorder   • Eczema Sister       Social History:     Social History     Socioeconomic History   • Marital status: /Civil Union     Spouse name: None   • Number of children: None   • Years of education: None   • Highest education level: None   Occupational History   • None   Tobacco Use   • Smoking status: Former     Passive exposure: Past   • Smokeless tobacco: Never   • Tobacco comments:     QUIT 2018   Vaping Use   • Vaping Use: Every day   • Substances: Nicotine, Flavoring   Substance and Sexual Activity   • Alcohol use: Yes     Comment: 2 per month   • Drug use: No   • Sexual activity: None   Other Topics Concern   • None   Social History Narrative    Caffeine use: 2 servings daily     Social Determinants of Health     Financial Resource Strain: Not on file   Food Insecurity: Not on file   Transportation Needs: Not on file   Physical Activity: Not on file   Stress: Not on file   Social Connections: Not on file   Intimate Partner Violence: Not on file   Housing Stability: Not on file      Current Medications:     Current Outpatient Medications   Medication Sig Dispense Refill   • losartan (COZAAR) 100 MG tablet Take 1 tablet (100 mg total) by mouth daily 90 tablet 0   • meclizine (ANTIVERT) 25 mg tablet Take 1 tablet (25 mg total) by mouth every 8 (eight) hours as needed for dizziness 30 tablet 0   • Tofacitinib Citrate ER (Xeljanz XR) 11 MG TB24 Take 1 tablet (11 mg total) by mouth daily 90 tablet 3   • Tofacitinib Citrate ER (Xeljanz XR) 22 MG TB24 Take 22 mg by mouth in the morning 56 tablet 0     No current facility-administered medications for this visit. Allergies: Allergies   Allergen Reactions   • Humira [Adalimumab] Rash      Physical Exam:     /98 (BP Location: Left arm, Patient Position: Sitting, Cuff Size: Standard)   Pulse 90   Temp 98.6 °F (37 °C) (Tympanic)   Resp 18   Ht 5' 7.24" (1.708 m)   Wt 95.7 kg (211 lb)   SpO2 98%   BMI 32.81 kg/m²     Physical Exam  Vitals and nursing note reviewed. Constitutional:       Appearance: Normal appearance.    HENT:      Right Ear: Tympanic membrane normal.      Left Ear: Tympanic membrane normal.      Nose: Nose normal.      Mouth/Throat:      Mouth: Mucous membranes are moist.   Eyes:      Pupils: Pupils are equal, round, and reactive to light. Cardiovascular:      Rate and Rhythm: Normal rate and regular rhythm. Pulses: Normal pulses. Heart sounds: Normal heart sounds. Pulmonary:      Effort: Pulmonary effort is normal.      Breath sounds: Normal breath sounds. Abdominal:      General: Abdomen is flat. Musculoskeletal:         General: Normal range of motion. Cervical back: Normal range of motion and neck supple. Skin:     General: Skin is warm. Neurological:      General: No focal deficit present. Mental Status: He is alert and oriented to person, place, and time.    Psychiatric:         Mood and Affect: Mood normal.         Behavior: Behavior normal.          Frida Kamara MD  [address] [place] [address]

## 2023-10-19 NOTE — ASSESSMENT & PLAN NOTE
Humira and stalera both caused rash  [place] makes him feel bloated   He will f/u with Gastroenterology

## 2023-10-19 NOTE — ASSESSMENT & PLAN NOTE
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

## 2023-10-25 ENCOUNTER — EVALUATION (OUTPATIENT)
Dept: PHYSICAL THERAPY | Facility: CLINIC | Age: 58
End: 2023-10-25
Payer: COMMERCIAL

## 2023-10-25 ENCOUNTER — APPOINTMENT (OUTPATIENT)
Dept: LAB | Facility: CLINIC | Age: 58
End: 2023-10-25
Payer: COMMERCIAL

## 2023-10-25 DIAGNOSIS — M10.9 ACUTE GOUT INVOLVING TOE OF LEFT FOOT, UNSPECIFIED CAUSE: ICD-10-CM

## 2023-10-25 DIAGNOSIS — I10 ESSENTIAL HYPERTENSION: ICD-10-CM

## 2023-10-25 DIAGNOSIS — H81.11 VERTIGO, BENIGN PAROXYSMAL, RIGHT: Primary | ICD-10-CM

## 2023-10-25 DIAGNOSIS — R42 VERTIGO: ICD-10-CM

## 2023-10-25 DIAGNOSIS — Z12.5 SCREENING FOR MALIGNANT NEOPLASM OF PROSTATE: ICD-10-CM

## 2023-10-25 DIAGNOSIS — K51.019 ULCERATIVE PANCOLITIS WITH COMPLICATION (HCC): ICD-10-CM

## 2023-10-25 DIAGNOSIS — E66.09 CLASS 1 OBESITY DUE TO EXCESS CALORIES WITH SERIOUS COMORBIDITY AND BODY MASS INDEX (BMI) OF 33.0 TO 33.9 IN ADULT: ICD-10-CM

## 2023-10-25 DIAGNOSIS — R73.01 IMPAIRED FASTING GLUCOSE: ICD-10-CM

## 2023-10-25 DIAGNOSIS — E78.5 HYPERLIPIDEMIA, UNSPECIFIED HYPERLIPIDEMIA TYPE: ICD-10-CM

## 2023-10-25 LAB
ALBUMIN SERPL BCP-MCNC: 4.1 G/DL (ref 3.5–5)
ALP SERPL-CCNC: 65 U/L (ref 34–104)
ALT SERPL W P-5'-P-CCNC: 25 U/L (ref 7–52)
ANION GAP SERPL CALCULATED.3IONS-SCNC: 5 MMOL/L
AST SERPL W P-5'-P-CCNC: 21 U/L (ref 13–39)
BASOPHILS # BLD AUTO: 0.06 THOUSANDS/ÂΜL (ref 0–0.1)
BASOPHILS NFR BLD AUTO: 1 % (ref 0–1)
BILIRUB SERPL-MCNC: 0.54 MG/DL (ref 0.2–1)
BUN SERPL-MCNC: 13 MG/DL (ref 5–25)
CALCIUM SERPL-MCNC: 9.1 MG/DL (ref 8.4–10.2)
CHLORIDE SERPL-SCNC: 100 MMOL/L (ref 96–108)
CHOLEST SERPL-MCNC: 145 MG/DL
CO2 SERPL-SCNC: 29 MMOL/L (ref 21–32)
CREAT SERPL-MCNC: 1.05 MG/DL (ref 0.6–1.3)
EOSINOPHIL # BLD AUTO: 0.28 THOUSAND/ÂΜL (ref 0–0.61)
EOSINOPHIL NFR BLD AUTO: 3 % (ref 0–6)
ERYTHROCYTE [DISTWIDTH] IN BLOOD BY AUTOMATED COUNT: 12.7 % (ref 11.6–15.1)
GFR SERPL CREATININE-BSD FRML MDRD: 77 ML/MIN/1.73SQ M
GLUCOSE P FAST SERPL-MCNC: 101 MG/DL (ref 65–99)
HCT VFR BLD AUTO: 44.6 % (ref 36.5–49.3)
HDLC SERPL-MCNC: 46 MG/DL
HGB BLD-MCNC: 15.1 G/DL (ref 12–17)
IMM GRANULOCYTES # BLD AUTO: 0.04 THOUSAND/UL (ref 0–0.2)
IMM GRANULOCYTES NFR BLD AUTO: 0 % (ref 0–2)
LDLC SERPL CALC-MCNC: 82 MG/DL (ref 0–100)
LYMPHOCYTES # BLD AUTO: 1.51 THOUSANDS/ÂΜL (ref 0.6–4.47)
LYMPHOCYTES NFR BLD AUTO: 16 % (ref 14–44)
MCH RBC QN AUTO: 31.5 PG (ref 26.8–34.3)
MCHC RBC AUTO-ENTMCNC: 33.9 G/DL (ref 31.4–37.4)
MCV RBC AUTO: 93 FL (ref 82–98)
MONOCYTES # BLD AUTO: 1.44 THOUSAND/ÂΜL (ref 0.17–1.22)
MONOCYTES NFR BLD AUTO: 15 % (ref 4–12)
NEUTROPHILS # BLD AUTO: 6.31 THOUSANDS/ÂΜL (ref 1.85–7.62)
NEUTS SEG NFR BLD AUTO: 65 % (ref 43–75)
NRBC BLD AUTO-RTO: 0 /100 WBCS
PLATELET # BLD AUTO: 314 THOUSANDS/UL (ref 149–390)
PMV BLD AUTO: 9.1 FL (ref 8.9–12.7)
POTASSIUM SERPL-SCNC: 3.9 MMOL/L (ref 3.5–5.3)
PROT SERPL-MCNC: 7.2 G/DL (ref 6.4–8.4)
RBC # BLD AUTO: 4.79 MILLION/UL (ref 3.88–5.62)
SODIUM SERPL-SCNC: 134 MMOL/L (ref 135–147)
TRIGL SERPL-MCNC: 83 MG/DL
WBC # BLD AUTO: 9.64 THOUSAND/UL (ref 4.31–10.16)

## 2023-10-25 PROCEDURE — 85025 COMPLETE CBC W/AUTO DIFF WBC: CPT

## 2023-10-25 PROCEDURE — 97112 NEUROMUSCULAR REEDUCATION: CPT | Performed by: PHYSICAL THERAPIST

## 2023-10-25 PROCEDURE — 80053 COMPREHEN METABOLIC PANEL: CPT

## 2023-10-25 PROCEDURE — 80061 LIPID PANEL: CPT

## 2023-10-25 PROCEDURE — 97162 PT EVAL MOD COMPLEX 30 MIN: CPT | Performed by: PHYSICAL THERAPIST

## 2023-10-25 PROCEDURE — 95992 CANALITH REPOSITIONING PROC: CPT | Performed by: PHYSICAL THERAPIST

## 2023-10-25 PROCEDURE — 36415 COLL VENOUS BLD VENIPUNCTURE: CPT

## 2023-10-25 NOTE — PROGRESS NOTES
PT Evaluation     Today's date: 10/25/2023  Patient name: Gallo Coyne  : 1965  MRN: [id number]  Referring provider: Tyler Graves MD  Dx:   Encounter Diagnosis     ICD-10-CM    1. Vertigo  R42 Ambulatory Referral to Physical Therapy                     Assessment  Assessment details: Assessment:  Pt is a 60 y.o. male who presents to the clinic with reports of vertigo. Pt's subjective reports is consistent with BPPV and presents with Nickola Quiver test positive when head is turned to the right transient nystagmus indicative of right posterior canalithiasis. Patient's impairments include dizziness with turning and difficulty focusing eyes. These impairments are currently limiting the patient's ability to drive and work safely. Pt would benefit from skilled outpatient physical therapy to address the above impairments in order to improve patient's QOL,  reduce symptoms of dizziness and maximize function. Patient educated that symptoms of vertigo may be exacerbated over the next 24-48 hours and that this can be normal after PT evaluation and treatment. Patient advised to perform regular daily routine as able and to avoid excessive head movements and positions changes and these can illicit symptoms. Patient verbalized understanding.        Impairments: abnormal coordination, activity intolerance, impaired balance and lacks appropriate home exercise program    Symptom irritability: moderate  Goals    Patient will test negative for right posterior  BPPV within 3 treatment sessions      - Patient is independent with initial home exercise program for improvements at home within  4 weeks  -  Maureennet will report a reduction in dizziness symptoms by 50% or greater within  4 weeks    LTG's:   - Patient improves on all tasks of the MCSTIB for improved static balance within 8 weeks   - Patient improves score on FGA for improved dynamic balance within 8 weeks  - Patient will improve DHI by 17points indicating improved perception of balance within 8weeks  - Patient will be able to turn head/look up without onset of dizziness within 8 weeks    Plan  Plan details: Patient away on vacation and then back to work will only be able to return to Therapy per his schedule on Thursday 11/02/2023  Patient would benefit from: skilled physical therapy  Planned therapy interventions: neuromuscular re-education, balance, canalith repositioning, patient education and home exercise program  Frequency: 2x week  Duration in weeks: 8  Plan of Care beginning date: 10/25/2023  Plan of Care expiration date: 12/13/2023  Treatment plan discussed with: patient    Subjective    Objective  PT/OT Neuro Exam  Neurologic Exam  HISTORY:  HPI: Darío Abdul is a 60 y.o. male referred to outpatient physical therapy for dizziness and B/L ringing in his ears (L>R). Patient reports drives 1.5 hours to drive [address] [place] [address] just got dizzy driving. Thought it could have been bugs on windshield throwing his eyes off. Had dizzy spell, also dizzy with lying under car to change oil, went to turn over got dizzy. Not all the time just with certain head movements. Onset: couple of months ago  Symptoms: lightheaded   Frequency: few times per day, sometimes feels he loses balance to the left. Duration: brief periods   Intensity: Best 0/10, Worst 6/10, Average 4-5/10    Dysequilibrium: Yes  Lightheadedness: Yes  Vertigo: Yes   Rocking or Swaying: No         Oscillopsia: Yes  Diplopia: Did initially but no longer constant.  Does have trouble focusing right away  Motion sickness: No  Floating, Swimming, Disconnected: Yes    Exacerbation Factors:  Bending over: Yes  Turning Head: Yes  Rolling in bed: Yes rolling to the right  Walking: No  Looking up: No  Supine to/from sitting: Yes - lying back   Optokinetic movement: Yes  Walking in busy environment: No     Concurrent Complaints:  Tinnitus:Yes  Aural Fullness:Yes in left   Known hearing loss:No not that he knows of   Nausea, Vomiting: No  Altered Vision: Yes  Poor Concentration: Yes  Memory Loss: No  Peripheral Neuropathy:No  Cervical Pain: always with a little it of stiffness   Headache: No mild, will go away without meds. No worse. Past Medical History:   Diagnosis Date    Colitis     Hypertension     Sleep apnea      Is patient taking medication for this issue? Yes only took one - with little effect      PHYSICAL FINDINGS:  Cervical Spine Examination:    Resting posture:      Normal    Cervical spine active range of motion:   within normal limits    Oculomotor ROM : WNL  Resting nystagmus: No  Gaze holding nystagmus Yes   Smooth pursuit Normal    Vertical Saccades:Normal  Horizontal Saccades:Normal  Convergence: Abnormal       Head thrust (room light): Abnormal when head turned fast to the left  VORx1: unable to focus on target   VOR cancel: WNL     Dynamic Visual Acuity: TBA PRN  Static Head: 20/  Dynamic Head: 20/        MCTSIB  30s eyes open firm surface   30s eyes closed form surface  30s eyes open foam surface  4s,4s, 25s eyes closed foam surface    FGA: Tba PRN      DHI:   0-30 mild , 30-60 moderate,  severe disability      Positional testing: Right Left   [place] (+) upward beating right ROT nystagmus with spinning sensation   Onset 15" lasting 25"     Roll test:               Selwyn Ponce, PT  10/25/2023           Short Term Goal Expiration Date:(4 weeks 11/25/2023)  Long Term Goal Expiration Date: (12/13/2023)  POC Expiration Date: (12/13/2023)  Authorized (10/25/2023-12/31/2023)  Visits Requested Visits Authorized Visits Completed Visits Scheduled   99 99 1 1   Details    Visit count: 1 of ?   Auth date:          Precautions (-)       Manuals 10/25                                       Neuro Re-Ed  Educatio                                                                Ther Ex                                                                        Ther Activity                        Gait Training Modalities        CRT R epley x 1

## 2023-11-02 ENCOUNTER — TELEPHONE (OUTPATIENT)
Dept: PHYSICAL THERAPY | Facility: CLINIC | Age: 58
End: 2023-11-02

## 2023-11-02 NOTE — TELEPHONE ENCOUNTER
VICKIE requesting a phone call back. Acknowledged we received his my chart cancel for today's appointment but looking to make sure he is feeling better and we can discharge out this episode of PT care.

## 2023-11-03 ENCOUNTER — OFFICE VISIT (OUTPATIENT)
Dept: FAMILY MEDICINE CLINIC | Facility: CLINIC | Age: 58
End: 2023-11-03
Payer: COMMERCIAL

## 2023-11-03 VITALS
SYSTOLIC BLOOD PRESSURE: 128 MMHG | TEMPERATURE: 97.7 F | OXYGEN SATURATION: 97 % | RESPIRATION RATE: 16 BRPM | DIASTOLIC BLOOD PRESSURE: 72 MMHG | HEART RATE: 86 BPM | BODY MASS INDEX: 32.87 KG/M2 | HEIGHT: 67 IN | WEIGHT: 209.4 LBS

## 2023-11-03 DIAGNOSIS — J40 BRONCHITIS: Primary | ICD-10-CM

## 2023-11-03 PROCEDURE — 87070 CULTURE OTHR SPECIMN AEROBIC: CPT | Performed by: NURSE PRACTITIONER

## 2023-11-03 PROCEDURE — 99213 OFFICE O/P EST LOW 20 MIN: CPT | Performed by: NURSE PRACTITIONER

## 2023-11-03 RX ORDER — AZITHROMYCIN 250 MG/1
TABLET, FILM COATED ORAL
Qty: 6 TABLET | Refills: 0 | Status: SHIPPED | OUTPATIENT
Start: 2023-11-03 | End: 2023-11-08

## 2023-11-03 NOTE — ASSESSMENT & PLAN NOTE
Symptoms started about a week ago, not improving. Send throat culture, home test for covid negative x2. Lungs course throughout, no sob, vitals wnl. Will start azithromycin, recommend otc cold meds prn, hydration,tea with honey.   Pt instructed to call for reevaluation if sx worsen or persist.

## 2023-11-03 NOTE — PROGRESS NOTES
Name: Max Hunter      : 1965      MRN: [id number]  Encounter Provider: JAMES Magana  Encounter Date: 11/3/2023   Encounter department: [place] [address]. Bronchitis  Assessment & Plan:  Symptoms started about a week ago, not improving. Send throat culture, home test for covid negative x2. Lungs course throughout, no sob, vitals wnl. Will start azithromycin, recommend otc cold meds prn, hydration,tea with honey. Pt instructed to call for reevaluation if sx worsen or persist.      Orders:  -     azithromycin (Zithromax) 250 mg tablet; Take 2 tablets (500 mg total) by mouth daily for 1 day, THEN 1 tablet (250 mg total) daily for 4 days. -     Throat culture; Future           Subjective      Pt is a 60 y.o. y/o male who is seen today for evaluation of URI symptoms. He started to feel sick on Saturday and he started taking emergen-c. He went to work Monday and was very hoarse. His throat worsened, he has a non-productive cough, post-nasal drip, sinus congestion. He has slight headache. He says when he lays down he feels like it is harder to breath in his chest.  Denies fever, chills. Appetite is decreased but he is eating and hydrating. Denies n/v/d. No fatigue. He tested negative for covid twice at home, most recently today. He is taking tussin DM, this does not seem to be helping. Review of Systems   Constitutional:  Positive for appetite change and fatigue. Negative for chills and fever. HENT:  Positive for congestion, sore throat and voice change. Negative for ear pain, hearing loss, rhinorrhea, sinus pressure, sinus pain and sneezing. Respiratory:  Positive for cough. Negative for chest tightness, shortness of breath and wheezing. Gastrointestinal:  Negative for diarrhea, nausea and vomiting. Musculoskeletal:  Positive for myalgias. Neurological:  Negative for dizziness, light-headedness and headaches. Hematological:  Negative for adenopathy. Current Outpatient Medications on File Prior to Visit   Medication Sig    losartan (COZAAR) 100 MG tablet Take 1 tablet (100 mg total) by mouth daily    meclizine (ANTIVERT) 25 mg tablet Take 1 tablet (25 mg total) by mouth every 8 (eight) hours as needed for dizziness    Tofacitinib Citrate ER (Xeljanz XR) 11 MG TB24 Take 1 tablet (11 mg total) by mouth daily    Tofacitinib Citrate ER (Xeljanz XR) 22 MG TB24 Take 22 mg by mouth in the morning (Patient not taking: Reported on 11/3/2023)       Objective     /72   Pulse 86   Temp 97.7 °F (36.5 °C) (Tympanic)   Resp 16   Ht 5' 7.24" (1.708 m)   Wt 95 kg (209 lb 6.4 oz)   SpO2 97%   BMI 32.56 kg/m²     Physical Exam  Vitals reviewed. Constitutional:       General: He is awake. He is not in acute distress. Appearance: Normal appearance. He is well-developed and well-groomed. He is not ill-appearing. HENT:      Head: Normocephalic. Right Ear: Hearing, tympanic membrane, ear canal and external ear normal. No middle ear effusion. Left Ear: Hearing, ear canal and external ear normal.  No middle ear effusion. Tympanic membrane is bulging. Nose: Mucosal edema and congestion present. Mouth/Throat:      Mouth: Mucous membranes are moist. Mucous membranes are not dry. Pharynx: Posterior oropharyngeal erythema present. No oropharyngeal exudate or uvula swelling. Tonsils: No tonsillar exudate or tonsillar abscesses. 1+ on the right. 1+ on the left. Eyes:      Conjunctiva/sclera: Conjunctivae normal.   Cardiovascular:      Rate and Rhythm: Normal rate and regular rhythm. Heart sounds: Normal heart sounds. Pulmonary:      Effort: Pulmonary effort is normal.      Breath sounds: Rhonchi (throughout) present. Lymphadenopathy:      Head:      Right side of head: No submental, submandibular, tonsillar, preauricular, posterior auricular or occipital adenopathy.       Left side of head: No submental, submandibular, tonsillar, preauricular, posterior auricular or occipital adenopathy. Cervical: No cervical adenopathy. Skin:     General: Skin is warm and dry. Neurological:      Mental Status: He is alert and oriented to person, place, and time. Psychiatric:         Attention and Perception: Attention normal.         Mood and Affect: Mood normal.         Speech: Speech normal.         Behavior: Behavior normal. Behavior is cooperative. Thought Content:  Thought content normal.         Cognition and Memory: Cognition normal.         Judgment: Judgment normal.       JAMES Romero

## 2023-11-05 LAB — BACTERIA THROAT CULT: NORMAL

## 2023-11-07 ENCOUNTER — TELEPHONE (OUTPATIENT)
Dept: GASTROENTEROLOGY | Facility: CLINIC | Age: 58
End: 2023-11-07

## 2023-11-07 NOTE — TELEPHONE ENCOUNTER
DANN CHARLES ([place]: [id number])  Earl Nielson XR 11MG er tablets     Form  [employer] Electronic Prior Authorization Form (2017 NCPDP)  Created  8 minutes ago  Sent to Plan  1 minute ago  Determination  Wait for Questions  [employer] 2017 NCPDP typically responds with questions in less than 15 minutes, but may take up to 24 hours.

## 2023-11-08 NOTE — TELEPHONE ENCOUNTER
Clinical questions answered and submitted. DANN CHARLES ([place]: [id number])  Velma Dia XR 11MG er tablets     Form  [employer] Electronic Prior Authorization Form (2017 NCPDP)  Created  1 day ago  Sent to Plan  1 day ago  Plan Response  1 day ago  Submit Clinical Questions  less than a minute ago  Determination  Wait for Determination  Please wait for [employer] 2017 NCPDP to return a determination.

## 2023-11-09 NOTE — TELEPHONE ENCOUNTER
Xeljanz XR 11 mg er tablets daily has now been approved from 11/8/23 - 11/8/24 [place] Case #[id number]. Letter scanned in media. DANN CHARLES ([place]: [id number])  Lydia Zapatatzer XR 11MG er tablets     Form  [employer] Electronic Prior Authorization Form (2017 NCPDP)  Created  2 days ago  Sent to Plan  2 days ago  Plan Response  2 days ago  Submit Clinical Questions  18 hours ago  Determination  Favorable  11 hours ago  Message from Plan  Request Reference Number: [id number]. XELJANZ XR TAB 11MG is approved through 11/08/2024. Your patient may now fill this prescription and it will be covered.

## 2023-11-24 ENCOUNTER — OFFICE VISIT (OUTPATIENT)
Dept: FAMILY MEDICINE CLINIC | Facility: CLINIC | Age: 58
End: 2023-11-24
Payer: COMMERCIAL

## 2023-11-24 VITALS
SYSTOLIC BLOOD PRESSURE: 140 MMHG | DIASTOLIC BLOOD PRESSURE: 90 MMHG | TEMPERATURE: 98.2 F | WEIGHT: 207 LBS | OXYGEN SATURATION: 98 % | HEART RATE: 82 BPM | BODY MASS INDEX: 32.49 KG/M2 | RESPIRATION RATE: 17 BRPM | HEIGHT: 67 IN

## 2023-11-24 DIAGNOSIS — E66.01 CLASS 2 SEVERE OBESITY DUE TO EXCESS CALORIES WITH SERIOUS COMORBIDITY IN ADULT, UNSPECIFIED BMI (HCC): ICD-10-CM

## 2023-11-24 DIAGNOSIS — K21.9 GASTROESOPHAGEAL REFLUX DISEASE WITHOUT ESOPHAGITIS: Primary | ICD-10-CM

## 2023-11-24 DIAGNOSIS — K51.019 ULCERATIVE PANCOLITIS WITH COMPLICATION (HCC): ICD-10-CM

## 2023-11-24 DIAGNOSIS — I10 ESSENTIAL HYPERTENSION: ICD-10-CM

## 2023-11-24 PROBLEM — R10.13 DYSPEPSIA: Status: RESOLVED | Noted: 2021-09-22 | Resolved: 2023-11-24

## 2023-11-24 PROBLEM — R21 RASH IN ADULT: Status: RESOLVED | Noted: 2022-04-27 | Resolved: 2023-11-24

## 2023-11-24 PROCEDURE — 99214 OFFICE O/P EST MOD 30 MIN: CPT | Performed by: FAMILY MEDICINE

## 2023-11-24 RX ORDER — LOSARTAN POTASSIUM 100 MG/1
100 TABLET ORAL DAILY
Qty: 90 TABLET | Refills: 0 | Status: SHIPPED | OUTPATIENT
Start: 2023-11-24

## 2023-11-24 NOTE — PROGRESS NOTES
Name: Jassi Solorio      : 1965      MRN: [id number]  Encounter Provider: Jhon López MD  Encounter Date: 2023   Encounter department: [facility]     1. Gastroesophageal reflux disease without esophagitis  Assessment & Plan:  Getting endoscopy done next year      2. Ulcerative pancolitis with complication [facility])  Assessment & Plan:  Stable on current meds      3. Class 2 severe obesity due to excess calories with serious comorbidity in adult, unspecified BMI (HCC)  Assessment & Plan:  Diet, exercise and portion control      4. Essential hypertension  Assessment & Plan:  Not controlled  To check BP at home and call with readings   He wants to continue diet and exercise    Orders:  -     losartan (COZAAR) 100 MG tablet; Take 1 tablet (100 mg total) by mouth daily  -     Comprehensive metabolic panel           Subjective     HPI  Here for follow up  Eating better and trying to watch his portions and cutting back on salt      Review of Systems   Constitutional: Negative. HENT: Negative. Respiratory: Negative. Cardiovascular: Negative. Gastrointestinal: Negative. Endocrine: Negative. Genitourinary: Negative. Musculoskeletal: Negative. Neurological: Negative. Hematological: Negative. Psychiatric/Behavioral: Negative. Past Medical History:   Diagnosis Date   • Colitis    • Hypertension    • Sleep apnea      Past Surgical History:   Procedure Laterality Date   • CARDIAC CATHETERIZATION     • COLONOSCOPY     • MI COLONOSCOPY FLX DX W/COLLJ SPEC WHEN PFRMD N/A 2016    Procedure: COLONOSCOPY;  Surgeon: Genetta Klinefelter, MD;  [facility]; Service: Gastroenterology   • MI SIGMOIDOSCOPY FLX DX W/COLLJ SPEC BR/WA IF PFRMD N/A 2018    Procedure: Juarez Hampton;  Surgeon: Lisa Mason MD;  [facility];   Service: Gastroenterology     Family History   Problem Relation Age of Onset   • Diabetes Mother • Heart disease Father         Cardiac Disorder   • Heart disease Brother         Cardiac Disorder   • Eczema Sister      Social History     Socioeconomic History   • Marital status: /Civil Union     Spouse name: None   • Number of children: None   • Years of education: None   • Highest education level: None   Occupational History   • None   Tobacco Use   • Smoking status: Former     Passive exposure: Past   • Smokeless tobacco: Never   • Tobacco comments:     QUIT 2018   Vaping Use   • Vaping Use: Every day   • Substances: Nicotine, Flavoring   Substance and Sexual Activity   • Alcohol use: Yes     Comment: 2 per month   • Drug use: No   • Sexual activity: None   Other Topics Concern   • None   Social History Narrative    Caffeine use: 2 servings daily     Social Determinants of Health     Financial Resource Strain: Not on file   Food Insecurity: Not on file   Transportation Needs: Not on file   Physical Activity: Not on file   Stress: Not on file   Social Connections: Not on file   Intimate Partner Violence: Not on file   Housing Stability: Not on file     Current Outpatient Medications on File Prior to Visit   Medication Sig   • Tofacitinib Citrate ER (Xeljanz XR) 11 MG TB24 Take 1 tablet (11 mg total) by mouth daily   • [DISCONTINUED] losartan (COZAAR) 100 MG tablet Take 1 tablet (100 mg total) by mouth daily   • meclizine (ANTIVERT) 25 mg tablet Take 1 tablet (25 mg total) by mouth every 8 (eight) hours as needed for dizziness (Patient not taking: Reported on 11/24/2023)   • [DISCONTINUED] Tofacitinib Citrate ER (Xeljanz XR) 22 MG TB24 Take 22 mg by mouth in the morning (Patient not taking: Reported on 11/3/2023)     Allergies   Allergen Reactions   • Humira [Adalimumab] Rash     Immunization History   Administered Date(s) Administered   • COVID-19 PFIZER VACCINE 0.3 ML IM 04/19/2021, 05/14/2021, 12/29/2021   • INFLUENZA 12/22/2016, 12/21/2017, 12/03/2018   • Influenza Quadrivalent Preservative Free 3 years and older IM 11/20/2014, 12/03/2015, 12/22/2016, 12/21/2017   • Influenza, injectable, quadrivalent, preservative free 0.5 mL 10/19/2023   • Influenza, recombinant, quadrivalent,injectable, preservative free 12/03/2018   • Pneumococcal Polysaccharide PPV23 05/22/2014   • Tdap 06/09/2016       Objective     /90 (BP Location: Left arm, Patient Position: Sitting, Cuff Size: Standard)   Pulse 82   Temp 98.2 °F (36.8 °C) (Tympanic)   Resp 17   Ht 5' 7.24" (1.708 m)   Wt 93.9 kg (207 lb)   SpO2 98%   BMI 32.19 kg/m²     Physical Exam  Vitals and nursing note reviewed. Constitutional:       Appearance: Normal appearance. Cardiovascular:      Rate and Rhythm: Normal rate and regular rhythm. Pulses: Normal pulses. Heart sounds: Normal heart sounds. Pulmonary:      Effort: Pulmonary effort is normal.      Breath sounds: Normal breath sounds. Neurological:      General: No focal deficit present. Mental Status: He is alert and oriented to person, place, and time.    Psychiatric:         Mood and Affect: Mood normal.         Behavior: Behavior normal.       Aldo Pereira MD

## 2023-12-21 ENCOUNTER — OFFICE VISIT (OUTPATIENT)
Dept: AUDIOLOGY | Age: 58
End: 2023-12-21
Payer: COMMERCIAL

## 2023-12-21 DIAGNOSIS — H93.13 TINNITUS OF BOTH EARS: ICD-10-CM

## 2023-12-21 DIAGNOSIS — H90.3 ASYMMETRIC SNHL (SENSORINEURAL HEARING LOSS): Primary | ICD-10-CM

## 2023-12-21 PROCEDURE — 92557 COMPREHENSIVE HEARING TEST: CPT | Performed by: AUDIOLOGIST

## 2023-12-21 PROCEDURE — 92567 TYMPANOMETRY: CPT | Performed by: AUDIOLOGIST

## 2023-12-21 NOTE — PROGRESS NOTES
HEARING EVALUATION    Name:  Gerald Snow  :  1965  Age:  58 y.o.   MRN:  [id number]  Date of Evaluation: 23     History: Tinnitus and Difficulty Understanding  Reason for visit: Gerald Snow is being seen today at the request of Dr. Leal for an initial  evaluation of hearing.  Patient reports constant ringing tinnitus that he believes began around the time he received his COVID vaccines.  He reports decreased hearing, left ear worse than right. Patient reports he was recently treated by PT for vertigo. Patient denies noise exposure and family history of hearing loss.    EVALUATION:    Otoscopic Evaluation:   Right Ear: Unremarkable, canal clear   Left Ear: Unremarkable, canal clear    Tympanometry:   Right Ear: Type A; normal middle ear pressure and static compliance    Left Ear: Type A; normal middle ear pressure and static compliance     Audiometry:  Conventional pure tone audiometry from 250 - 8000 Hz  was obtained with good reliability and revealed the following:     Right Ear: mild sensorineural hearing loss (SNHL)    Left Ear: mild to severe sensorineural hearing loss (SNHL)    Left ear asymmetry 4 KHz through 8 KHz.    Speech Audiometry:  Speech Reception (SRT)   Right Ear: 25 dB HL   Left Ear: 30 dB HL    Word Recognition Scores (WRS):  Right Ear: excellent (92 % correct)     Left Ear: fair (76 % correct)   Stimuli: [id number]    *see attached audiogram      RECOMMENDATIONS:  Annual hearing eval, Consult ENT, Return to [facility]. for F/U, Hearing Aid Evaluation, and Copy to Patient/Caregiver    Consult ENT re: tinnitus, asymmetric pure tone thresholds and asymmetric WRS. Patient will contact us to schedule HAE following ENT appointment.    PATIENT EDUCATION:   The results of today's findings were reviewed with Mr. Snow and his hearing thresholds were explained at length. Treatment options, including amplification and communication strategies, were discussed as appropriate. Mr. Snow voiced  understanding of his test results and had no further questions. Questions were addressed and the patient was encouraged to contact our department should concerns arise.      Rosa Maria Vincent., CCC-A  Clinical Audiologist  [facility] AUDIOLOGY  [facility] [address]  [place] [address]

## 2024-01-12 ENCOUNTER — ANESTHESIA EVENT (OUTPATIENT)
Dept: ANESTHESIOLOGY | Facility: HOSPITAL | Age: 59
End: 2024-01-12

## 2024-01-12 ENCOUNTER — ANESTHESIA (OUTPATIENT)
Dept: ANESTHESIOLOGY | Facility: HOSPITAL | Age: 59
End: 2024-01-12

## 2024-01-26 ENCOUNTER — HOSPITAL ENCOUNTER (OUTPATIENT)
Dept: GASTROENTEROLOGY | Facility: AMBULARY SURGERY CENTER | Age: 59
Setting detail: OUTPATIENT SURGERY
End: 2024-01-26
Payer: COMMERCIAL

## 2024-01-26 ENCOUNTER — ANESTHESIA (OUTPATIENT)
Dept: GASTROENTEROLOGY | Facility: AMBULARY SURGERY CENTER | Age: 59
End: 2024-01-26

## 2024-01-26 ENCOUNTER — ANESTHESIA EVENT (OUTPATIENT)
Dept: GASTROENTEROLOGY | Facility: AMBULARY SURGERY CENTER | Age: 59
End: 2024-01-26

## 2024-01-26 VITALS
BODY MASS INDEX: 32.8 KG/M2 | RESPIRATION RATE: 22 BRPM | SYSTOLIC BLOOD PRESSURE: 135 MMHG | HEART RATE: 51 BPM | DIASTOLIC BLOOD PRESSURE: 75 MMHG | TEMPERATURE: 96 F | WEIGHT: 209 LBS | HEIGHT: 67 IN | OXYGEN SATURATION: 95 %

## 2024-01-26 DIAGNOSIS — K22.70 BARRETT'S ESOPHAGUS WITHOUT DYSPLASIA: ICD-10-CM

## 2024-01-26 DIAGNOSIS — Z86.010 HX OF COLONIC POLYPS: ICD-10-CM

## 2024-01-26 PROCEDURE — 45380 COLONOSCOPY AND BIOPSY: CPT | Performed by: INTERNAL MEDICINE

## 2024-01-26 PROCEDURE — 43239 EGD BIOPSY SINGLE/MULTIPLE: CPT | Performed by: INTERNAL MEDICINE

## 2024-01-26 PROCEDURE — 88305 TISSUE EXAM BY PATHOLOGIST: CPT | Performed by: PATHOLOGY

## 2024-01-26 PROCEDURE — 88342 IMHCHEM/IMCYTCHM 1ST ANTB: CPT | Performed by: PATHOLOGY

## 2024-01-26 PROCEDURE — 88360 TUMOR IMMUNOHISTOCHEM/MANUAL: CPT | Performed by: PATHOLOGY

## 2024-01-26 RX ORDER — PROPOFOL 10 MG/ML
INJECTION, EMULSION INTRAVENOUS AS NEEDED
Status: DISCONTINUED | OUTPATIENT
Start: 2024-01-26 | End: 2024-01-26

## 2024-01-26 RX ORDER — PROPOFOL 10 MG/ML
INJECTION, EMULSION INTRAVENOUS CONTINUOUS PRN
Status: DISCONTINUED | OUTPATIENT
Start: 2024-01-26 | End: 2024-01-26

## 2024-01-26 RX ORDER — PANTOPRAZOLE SODIUM 40 MG/1
40 TABLET, DELAYED RELEASE ORAL DAILY
Qty: 90 TABLET | Refills: 3 | Status: SHIPPED | OUTPATIENT
Start: 2024-01-26 | End: 2024-04-25

## 2024-01-26 RX ORDER — SODIUM CHLORIDE, SODIUM LACTATE, POTASSIUM CHLORIDE, CALCIUM CHLORIDE 600; 310; 30; 20 MG/100ML; MG/100ML; MG/100ML; MG/100ML
INJECTION, SOLUTION INTRAVENOUS CONTINUOUS PRN
Status: DISCONTINUED | OUTPATIENT
Start: 2024-01-26 | End: 2024-01-26

## 2024-01-26 RX ADMIN — PROPOFOL 150 MCG/KG/MIN: 10 INJECTION, EMULSION INTRAVENOUS at 07:51

## 2024-01-26 RX ADMIN — SODIUM CHLORIDE, SODIUM LACTATE, POTASSIUM CHLORIDE, AND CALCIUM CHLORIDE: .6; .31; .03; .02 INJECTION, SOLUTION INTRAVENOUS at 08:27

## 2024-01-26 RX ADMIN — SODIUM CHLORIDE, SODIUM LACTATE, POTASSIUM CHLORIDE, AND CALCIUM CHLORIDE: .6; .31; .03; .02 INJECTION, SOLUTION INTRAVENOUS at 07:20

## 2024-01-26 RX ADMIN — PROPOFOL 150 MG: 10 INJECTION, EMULSION INTRAVENOUS at 07:51

## 2024-01-26 NOTE — ANESTHESIA POSTPROCEDURE EVALUATION
Post-Op Assessment Note    CV Status:  Stable  Pain Score: 0    Pain management: adequate       Mental Status:  Alert and awake   Hydration Status:  Stable   PONV Controlled:  None   Airway Patency:  Patent     Post Op Vitals Reviewed: Yes    No anethesia notable event occurred.    Staff: CRNA               BP      Temp      Pulse     Resp      SpO2

## 2024-01-26 NOTE — ANESTHESIA PROCEDURE NOTES
Anesthesia Notable Event    Date/Time: 1/26/2024 1:52 PM    Performed by: Eliezer Yuen MD  Authorized by: Eliezer Yuen MD

## 2024-01-26 NOTE — H&P
"History and Physical -  [facility]  Gerald Snow 59 y.o. male MRN: [id number]    HPI: Gerald Snow is a 59 y.o. year old male who presents with UC and GERD.      Review of Systems    Historical Information   Past Medical History:   Diagnosis Date    Colitis     Colon polyp     Hypertension     Sleep apnea      Past Surgical History:   Procedure Laterality Date    CARDIAC CATHETERIZATION      COLONOSCOPY      MI COLONOSCOPY FLX DX W/COLLJ SPEC WHEN PFRMD N/A 4/25/2016    Procedure: COLONOSCOPY;  Surgeon: Trinh Marin MD;  [facility];  Service: Gastroenterology    MI SIGMOIDOSCOPY FLX DX W/COLLJ SPEC BR/WA IF PFRMD N/A 12/4/2018    Procedure: SIGMOIDOSCOPY FLEXIBLE;  Surgeon: Elisa Long MD;  [facility];  Service: Gastroenterology     Social History   Social History     Substance and Sexual Activity   Alcohol Use Yes    Comment: 2 per month     Social History     Substance and Sexual Activity   Drug Use No     Social History     Tobacco Use   Smoking Status Former    Passive exposure: Past   Smokeless Tobacco Never   Tobacco Comments    QUIT 2018     Family History   Problem Relation Age of Onset    Diabetes Mother     Heart disease Father         Cardiac Disorder    Heart disease Brother         Cardiac Disorder    Eczema Sister        Meds/Allergies     (Not in a hospital admission)      Allergies   Allergen Reactions    Humira [Adalimumab] Rash       Objective     /84   Pulse 63   Temp (!) 96.7 °F (35.9 °C) (Temporal)   Resp 18   Ht 5' 7\" (1.702 m)   Wt 94.8 kg (209 lb)   SpO2 98%   BMI 32.73 kg/m²       PHYSICAL EXAM    Gen: NAD  CV: RRR  CHEST: Clear  ABD: soft, NT/ND  EXT: no edema  Neuro: AAO      ASSESSMENT/PLAN:  This is a 59 y.o. year old male here for UC and GERD.    PLAN:   Procedure: EGD and Colonoscopy.      "

## 2024-01-26 NOTE — ANESTHESIA PREPROCEDURE EVALUATION
Procedure:  EGD  COLONOSCOPY    Relevant Problems   CARDIO   (+) Essential hypertension      GI/HEPATIC   (+) Gastroesophageal reflux disease without esophagitis      MUSCULOSKELETAL   (+) Gout      PULMONARY   (+) JOSE (obstructive sleep apnea)        Physical Exam    Airway    Mallampati score: II  TM Distance: >3 FB  Neck ROM: full     Dental   No notable dental hx     Cardiovascular  No weak pulses    Pulmonary   No stridor    Other Findings        Anesthesia Plan  ASA Score- 2     Anesthesia Type- IV sedation with anesthesia with ASA Monitors.         Additional Monitors:     Airway Plan:            Plan Factors-    Chart reviewed. EKG reviewed.  Existing labs reviewed. Patient summary reviewed.                  Induction- intravenous.    Postoperative Plan- Plan for postoperative opioid use. Planned trial extubation    Informed Consent- Anesthetic plan and risks discussed with patient.  I personally reviewed this patient with the CRNA. Discussed and agreed on the Anesthesia Plan with the CRNA..

## 2024-01-30 PROCEDURE — 88305 TISSUE EXAM BY PATHOLOGIST: CPT | Performed by: PATHOLOGY

## 2024-01-30 PROCEDURE — 88360 TUMOR IMMUNOHISTOCHEM/MANUAL: CPT | Performed by: PATHOLOGY

## 2024-01-30 PROCEDURE — 88342 IMHCHEM/IMCYTCHM 1ST ANTB: CPT | Performed by: PATHOLOGY

## 2024-02-01 DIAGNOSIS — K52.9 COLITIS: Primary | ICD-10-CM

## 2024-02-09 DIAGNOSIS — I10 ESSENTIAL HYPERTENSION: ICD-10-CM

## 2024-02-09 RX ORDER — LOSARTAN POTASSIUM 100 MG/1
100 TABLET ORAL DAILY
Qty: 90 TABLET | Refills: 1 | Status: SHIPPED | OUTPATIENT
Start: 2024-02-09

## 2024-03-11 PROBLEM — Z86.19 HX OF LYME DISEASE: Status: ACTIVE | Noted: 2024-03-11

## 2024-03-11 PROBLEM — K21.00 GASTROESOPHAGEAL REFLUX DISEASE WITH ESOPHAGITIS WITHOUT HEMORRHAGE: Status: ACTIVE | Noted: 2024-03-11

## 2024-03-11 PROBLEM — H93.13 TINNITUS OF BOTH EARS: Status: ACTIVE | Noted: 2024-03-11

## 2024-03-11 PROBLEM — H90.3 ASYMMETRIC SNHL (SENSORINEURAL HEARING LOSS): Status: ACTIVE | Noted: 2024-03-11

## 2024-03-11 PROBLEM — K22.70 BARRETT'S ESOPHAGUS WITHOUT DYSPLASIA: Status: ACTIVE | Noted: 2024-03-11

## 2024-03-11 PROBLEM — H90.3 SENSORINEURAL HEARING LOSS (SNHL) OF BOTH EARS: Status: ACTIVE | Noted: 2024-03-11

## 2024-03-11 PROBLEM — K20.0 EOSINOPHILIC ESOPHAGITIS: Status: ACTIVE | Noted: 2024-03-11

## 2024-03-21 ENCOUNTER — OFFICE VISIT (OUTPATIENT)
Dept: FAMILY MEDICINE CLINIC | Facility: CLINIC | Age: 59
End: 2024-03-21
Payer: COMMERCIAL

## 2024-03-21 VITALS
DIASTOLIC BLOOD PRESSURE: 80 MMHG | SYSTOLIC BLOOD PRESSURE: 120 MMHG | WEIGHT: 210.4 LBS | TEMPERATURE: 98.4 F | RESPIRATION RATE: 16 BRPM | HEART RATE: 69 BPM | OXYGEN SATURATION: 98 % | BODY MASS INDEX: 33.02 KG/M2 | HEIGHT: 67 IN

## 2024-03-21 DIAGNOSIS — K51.019 ULCERATIVE PANCOLITIS WITH COMPLICATION (HCC): Primary | ICD-10-CM

## 2024-03-21 DIAGNOSIS — H93.13 TINNITUS OF BOTH EARS: ICD-10-CM

## 2024-03-21 DIAGNOSIS — K22.70 BARRETT'S ESOPHAGUS WITHOUT DYSPLASIA: ICD-10-CM

## 2024-03-21 DIAGNOSIS — E66.09 CLASS 1 OBESITY DUE TO EXCESS CALORIES WITHOUT SERIOUS COMORBIDITY WITH BODY MASS INDEX (BMI) OF 32.0 TO 32.9 IN ADULT: ICD-10-CM

## 2024-03-21 DIAGNOSIS — I10 ESSENTIAL HYPERTENSION: ICD-10-CM

## 2024-03-21 PROBLEM — K52.9 COLITIS WITH RECTAL BLEEDING: Status: RESOLVED | Noted: 2018-12-03 | Resolved: 2024-03-21

## 2024-03-21 PROBLEM — R85.7: Status: RESOLVED | Noted: 2021-09-22 | Resolved: 2024-03-21

## 2024-03-21 PROBLEM — J31.0 CHRONIC RHINITIS: Status: ACTIVE | Noted: 2024-03-21

## 2024-03-21 PROBLEM — K62.5 COLITIS WITH RECTAL BLEEDING: Status: RESOLVED | Noted: 2018-12-03 | Resolved: 2024-03-21

## 2024-03-21 PROBLEM — K21.9 GASTROESOPHAGEAL REFLUX DISEASE WITHOUT ESOPHAGITIS: Status: RESOLVED | Noted: 2023-10-19 | Resolved: 2024-03-21

## 2024-03-21 PROBLEM — J40 BRONCHITIS: Status: RESOLVED | Noted: 2023-11-03 | Resolved: 2024-03-21

## 2024-03-21 PROBLEM — K21.00 GASTROESOPHAGEAL REFLUX DISEASE WITH ESOPHAGITIS WITHOUT HEMORRHAGE: Status: RESOLVED | Noted: 2024-03-11 | Resolved: 2024-03-21

## 2024-03-21 PROCEDURE — 99214 OFFICE O/P EST MOD 30 MIN: CPT | Performed by: FAMILY MEDICINE

## 2024-03-21 NOTE — PROGRESS NOTES
Name: Gerald Snow      : 1965      MRN: [id number]  Encounter Provider: Jayde Leal MD  Encounter Date: 3/21/2024   Encounter department: [facility]    Assessment & Plan     1. Ulcerative pancolitis with complication (HCC)  Assessment & Plan:  Stable on recent colonoscopy 2024  Continue to monitor       2. Essential hypertension  Assessment & Plan:  Doing well on current meds    Orders:  -     Lipid Panel With Direct LDL; Future    3. Anderson's esophagus without dysplasia  Assessment & Plan:  Stable on pantoprazole      4. Tinnitus of both ears L>R  Assessment & Plan:  Seeing ent for follow up  To get labs as ordered       5. Class 1 obesity due to excess calories without serious comorbidity with body mass index (BMI) of 32.0 to 32.9 in adult  Assessment & Plan:  Discussed portion control, diet and exercise             Subjective     HPI  Here for follow up  Feels well overall  Continued to have issues with tinnitus '    Review of Systems   Constitutional: Negative.    HENT: Negative.     Respiratory: Negative.     Cardiovascular: Negative.    Musculoskeletal: Negative.    Neurological: Negative.    Hematological: Negative.    Psychiatric/Behavioral: Negative.         Past Medical History:   Diagnosis Date   • Colitis    • Colitis with rectal bleeding 2018   • Colon polyp    • Hypertension    • Sleep apnea      Past Surgical History:   Procedure Laterality Date   • CARDIAC CATHETERIZATION     • COLONOSCOPY     • AR COLONOSCOPY FLX DX W/COLLJ SPEC WHEN PFRMD N/A 2016    Procedure: COLONOSCOPY;  Surgeon: Trinh Marin MD;  [facility];  Service: Gastroenterology   • AR SIGMOIDOSCOPY FLX DX W/COLLJ SPEC BR/WA IF PFRMD N/A 2018    Procedure: SIGMOIDOSCOPY FLEXIBLE;  Surgeon: Elisa Long MD;  [facility];  Service: Gastroenterology     Family History   Problem Relation Age of Onset   • Diabetes Mother    • Heart disease Father         Cardiac  Disorder   • Heart disease Brother         Cardiac Disorder   • Eczema Sister      Social History     Socioeconomic History   • Marital status: /Civil Union     Spouse name: None   • Number of children: None   • Years of education: None   • Highest education level: None   Occupational History   • None   Tobacco Use   • Smoking status: Former     Passive exposure: Past   • Smokeless tobacco: Never   • Tobacco comments:     QUIT 2018   Vaping Use   • Vaping status: Every Day   • Substances: Nicotine, Flavoring   Substance and Sexual Activity   • Alcohol use: Yes     Comment: 2 per month   • Drug use: No   • Sexual activity: None   Other Topics Concern   • None   Social History Narrative    Caffeine use: 2 servings daily     Social Determinants of Health     Financial Resource Strain: Not on file   Food Insecurity: Not on file   Transportation Needs: Not on file   Physical Activity: Not on file   Stress: Not on file   Social Connections: Not on file   Intimate Partner Violence: Not on file   Housing Stability: Not on file     Current Outpatient Medications on File Prior to Visit   Medication Sig   • losartan (COZAAR) 100 MG tablet Take 1 tablet (100 mg total) by mouth daily   • meclizine (ANTIVERT) 25 mg tablet Take 1 tablet (25 mg total) by mouth every 8 (eight) hours as needed for dizziness   • pantoprazole (PROTONIX) 40 mg tablet Take 1 tablet (40 mg total) by mouth daily   • Tofacitinib Citrate ER (Xeljanz XR) 11 MG TB24 Take 1 tablet (11 mg total) by mouth daily     Allergies   Allergen Reactions   • Humira [Adalimumab] Rash     Immunization History   Administered Date(s) Administered   • COVID-19 PFIZER VACCINE 0.3 ML IM 04/19/2021, 05/14/2021, 12/29/2021   • INFLUENZA 12/22/2016, 12/21/2017, 12/03/2018   • Influenza Quadrivalent Preservative Free 3 years and older IM 11/20/2014, 12/03/2015, 12/22/2016, 12/21/2017   • Influenza, injectable, quadrivalent, preservative free 0.5 mL 10/19/2023   • Influenza,  "recombinant, quadrivalent,injectable, preservative free 12/03/2018   • Pneumococcal Polysaccharide PPV23 05/22/2014   • Tdap 06/09/2016       Objective     /80 (BP Location: Left arm, Patient Position: Sitting, Cuff Size: Standard)   Pulse 69   Temp 98.4 °F (36.9 °C) (Tympanic)   Resp 16   Ht 5' 7\" (1.702 m)   Wt 95.4 kg (210 lb 6.4 oz)   SpO2 98%   BMI 32.95 kg/m²     Physical Exam  Constitutional:       Appearance: Normal appearance.   Cardiovascular:      Rate and Rhythm: Normal rate and regular rhythm.      Pulses: Normal pulses.      Heart sounds: Normal heart sounds.   Pulmonary:      Effort: Pulmonary effort is normal.      Breath sounds: Normal breath sounds.   Skin:     Capillary Refill: Capillary refill takes less than 2 seconds.   Neurological:      General: No focal deficit present.      Mental Status: He is alert and oriented to person, place, and time.   Psychiatric:         Mood and Affect: Mood normal.         Behavior: Behavior normal.       Jayde Leal MD    "

## 2024-05-09 ENCOUNTER — APPOINTMENT (OUTPATIENT)
Dept: LAB | Facility: CLINIC | Age: 59
End: 2024-05-09
Payer: COMMERCIAL

## 2024-05-09 DIAGNOSIS — K52.9 COLITIS: ICD-10-CM

## 2024-05-09 DIAGNOSIS — H93.13 TINNITUS OF BOTH EARS: ICD-10-CM

## 2024-05-09 DIAGNOSIS — I10 ESSENTIAL HYPERTENSION: ICD-10-CM

## 2024-05-09 DIAGNOSIS — H90.3 SENSORINEURAL HEARING LOSS (SNHL) OF BOTH EARS: ICD-10-CM

## 2024-05-09 LAB
25(OH)D3 SERPL-MCNC: 19.4 NG/ML (ref 30–100)
ALBUMIN SERPL BCP-MCNC: 4 G/DL (ref 3.5–5)
ALP SERPL-CCNC: 61 U/L (ref 34–104)
ALT SERPL W P-5'-P-CCNC: 30 U/L (ref 7–52)
ANA SER QL IA: NEGATIVE
ANION GAP SERPL CALCULATED.3IONS-SCNC: 5 MMOL/L (ref 4–13)
AST SERPL W P-5'-P-CCNC: 22 U/L (ref 13–39)
B BURGDOR IGG+IGM SER QL IA: NEGATIVE
BASOPHILS # BLD AUTO: 0.05 THOUSANDS/ÂΜL (ref 0–0.1)
BASOPHILS NFR BLD AUTO: 1 % (ref 0–1)
BILIRUB SERPL-MCNC: 0.5 MG/DL (ref 0.2–1)
BUN SERPL-MCNC: 19 MG/DL (ref 5–25)
CALCIUM SERPL-MCNC: 9.3 MG/DL (ref 8.4–10.2)
CHLORIDE SERPL-SCNC: 106 MMOL/L (ref 96–108)
CHOLEST SERPL-MCNC: 155 MG/DL
CO2 SERPL-SCNC: 29 MMOL/L (ref 21–32)
CREAT SERPL-MCNC: 1.05 MG/DL (ref 0.6–1.3)
CRP SERPL QL: 4.1 MG/L
EOSINOPHIL # BLD AUTO: 0.52 THOUSAND/ÂΜL (ref 0–0.61)
EOSINOPHIL NFR BLD AUTO: 7 % (ref 0–6)
ERYTHROCYTE [DISTWIDTH] IN BLOOD BY AUTOMATED COUNT: 13.3 % (ref 11.6–15.1)
GFR SERPL CREATININE-BSD FRML MDRD: 76 ML/MIN/1.73SQ M
GLUCOSE P FAST SERPL-MCNC: 105 MG/DL (ref 65–99)
HCT VFR BLD AUTO: 43.6 % (ref 36.5–49.3)
HDLC SERPL-MCNC: 51 MG/DL
HGB BLD-MCNC: 14.6 G/DL (ref 12–17)
IMM GRANULOCYTES # BLD AUTO: 0.03 THOUSAND/UL (ref 0–0.2)
IMM GRANULOCYTES NFR BLD AUTO: 0 % (ref 0–2)
LDLC SERPL CALC-MCNC: 85 MG/DL (ref 0–100)
LYMPHOCYTES # BLD AUTO: 1.36 THOUSANDS/ÂΜL (ref 0.6–4.47)
LYMPHOCYTES NFR BLD AUTO: 18 % (ref 14–44)
MCH RBC QN AUTO: 31.6 PG (ref 26.8–34.3)
MCHC RBC AUTO-ENTMCNC: 33.5 G/DL (ref 31.4–37.4)
MCV RBC AUTO: 94 FL (ref 82–98)
MONOCYTES # BLD AUTO: 1.12 THOUSAND/ÂΜL (ref 0.17–1.22)
MONOCYTES NFR BLD AUTO: 15 % (ref 4–12)
NEUTROPHILS # BLD AUTO: 4.45 THOUSANDS/ÂΜL (ref 1.85–7.62)
NEUTS SEG NFR BLD AUTO: 59 % (ref 43–75)
NRBC BLD AUTO-RTO: 0 /100 WBCS
PLATELET # BLD AUTO: 306 THOUSANDS/UL (ref 149–390)
PMV BLD AUTO: 9.5 FL (ref 8.9–12.7)
POTASSIUM SERPL-SCNC: 4.3 MMOL/L (ref 3.5–5.3)
PROT SERPL-MCNC: 6.8 G/DL (ref 6.4–8.4)
RBC # BLD AUTO: 4.62 MILLION/UL (ref 3.88–5.62)
SODIUM SERPL-SCNC: 140 MMOL/L (ref 135–147)
TRIGL SERPL-MCNC: 94 MG/DL
WBC # BLD AUTO: 7.53 THOUSAND/UL (ref 4.31–10.16)

## 2024-05-09 PROCEDURE — 86140 C-REACTIVE PROTEIN: CPT

## 2024-05-09 PROCEDURE — 86038 ANTINUCLEAR ANTIBODIES: CPT

## 2024-05-09 PROCEDURE — 85025 COMPLETE CBC W/AUTO DIFF WBC: CPT

## 2024-05-09 PROCEDURE — 84181 WESTERN BLOT TEST: CPT

## 2024-05-09 PROCEDURE — 82306 VITAMIN D 25 HYDROXY: CPT

## 2024-05-09 PROCEDURE — 86618 LYME DISEASE ANTIBODY: CPT

## 2024-05-09 PROCEDURE — 80053 COMPREHEN METABOLIC PANEL: CPT

## 2024-05-09 PROCEDURE — 36415 COLL VENOUS BLD VENIPUNCTURE: CPT

## 2024-05-09 PROCEDURE — 80061 LIPID PANEL: CPT

## 2024-05-13 DIAGNOSIS — E55.9 VITAMIN D DEFICIENCY: Primary | ICD-10-CM

## 2024-05-13 RX ORDER — ERGOCALCIFEROL 1.25 MG/1
50000 CAPSULE ORAL WEEKLY
Qty: 12 CAPSULE | Refills: 0 | Status: SHIPPED | OUTPATIENT
Start: 2024-05-13

## 2024-05-16 LAB — MISCELLANEOUS LAB TEST RESULT: NORMAL

## 2024-08-01 DIAGNOSIS — I10 ESSENTIAL HYPERTENSION: ICD-10-CM

## 2024-08-01 RX ORDER — LOSARTAN POTASSIUM 100 MG/1
100 TABLET ORAL DAILY
Qty: 100 TABLET | Refills: 1 | Status: SHIPPED | OUTPATIENT
Start: 2024-08-01

## 2024-10-14 DIAGNOSIS — Z12.5 PROSTATE CANCER SCREENING: ICD-10-CM

## 2024-10-14 DIAGNOSIS — I10 ESSENTIAL HYPERTENSION: Primary | ICD-10-CM

## 2024-10-21 ENCOUNTER — TELEPHONE (OUTPATIENT)
Dept: GASTROENTEROLOGY | Facility: CLINIC | Age: 59
End: 2024-10-21

## 2024-10-23 DIAGNOSIS — K51.019 ULCERATIVE PANCOLITIS WITH COMPLICATION (HCC): ICD-10-CM

## 2024-10-23 RX ORDER — TOFACITINIB 11 MG/1
1 TABLET, FILM COATED, EXTENDED RELEASE ORAL DAILY
Qty: 30 TABLET | Refills: 5 | Status: SHIPPED | OUTPATIENT
Start: 2024-10-23

## 2024-10-25 NOTE — TELEPHONE ENCOUNTER
Medication: XELJANZ 11MG  Directions:  1 TAB PO QD  Quantity: 30  Day Supply: 30  Insurance: [facility]  How Prior Auth was submitted: [employer]  Authorization Date range: 10/23/24-10/23/25  Authorization Number:  [id number]   Pharmacy that fills med:  [facility]  Patient aware of approval:YES  Patient aware of pharmacy information: YES

## 2025-01-04 DIAGNOSIS — K22.70 BARRETT'S ESOPHAGUS WITHOUT DYSPLASIA: ICD-10-CM

## 2025-01-06 RX ORDER — PANTOPRAZOLE SODIUM 40 MG/1
40 TABLET, DELAYED RELEASE ORAL DAILY
Qty: 90 TABLET | Refills: 3 | Status: SHIPPED | OUTPATIENT
Start: 2025-01-06

## 2025-01-09 ENCOUNTER — APPOINTMENT (OUTPATIENT)
Dept: LAB | Facility: CLINIC | Age: 60
End: 2025-01-09
Payer: COMMERCIAL

## 2025-01-09 ENCOUNTER — RESULTS FOLLOW-UP (OUTPATIENT)
Dept: FAMILY MEDICINE CLINIC | Facility: CLINIC | Age: 60
End: 2025-01-09

## 2025-01-09 DIAGNOSIS — Z12.5 PROSTATE CANCER SCREENING: ICD-10-CM

## 2025-01-09 LAB
ALBUMIN SERPL BCG-MCNC: 4 G/DL (ref 3.5–5)
ALP SERPL-CCNC: 73 U/L (ref 34–104)
ALT SERPL W P-5'-P-CCNC: 40 U/L (ref 7–52)
ANION GAP SERPL CALCULATED.3IONS-SCNC: 6 MMOL/L (ref 4–13)
AST SERPL W P-5'-P-CCNC: 22 U/L (ref 13–39)
BASOPHILS # BLD AUTO: 0.05 THOUSANDS/ΜL (ref 0–0.1)
BASOPHILS NFR BLD AUTO: 1 % (ref 0–1)
BILIRUB SERPL-MCNC: 0.29 MG/DL (ref 0.2–1)
BUN SERPL-MCNC: 15 MG/DL (ref 5–25)
CALCIUM SERPL-MCNC: 9.2 MG/DL (ref 8.4–10.2)
CHLORIDE SERPL-SCNC: 106 MMOL/L (ref 96–108)
CHOLEST SERPL-MCNC: 139 MG/DL (ref ?–200)
CO2 SERPL-SCNC: 30 MMOL/L (ref 21–32)
CREAT SERPL-MCNC: 0.99 MG/DL (ref 0.6–1.3)
EOSINOPHIL # BLD AUTO: 0.33 THOUSAND/ΜL (ref 0–0.61)
EOSINOPHIL NFR BLD AUTO: 4 % (ref 0–6)
ERYTHROCYTE [DISTWIDTH] IN BLOOD BY AUTOMATED COUNT: 12.8 % (ref 11.6–15.1)
GFR SERPL CREATININE-BSD FRML MDRD: 81 ML/MIN/1.73SQ M
GLUCOSE P FAST SERPL-MCNC: 105 MG/DL (ref 65–99)
HCT VFR BLD AUTO: 45.5 % (ref 36.5–49.3)
HDLC SERPL-MCNC: 42 MG/DL
HGB BLD-MCNC: 14.8 G/DL (ref 12–17)
IMM GRANULOCYTES # BLD AUTO: 0.12 THOUSAND/UL (ref 0–0.2)
IMM GRANULOCYTES NFR BLD AUTO: 2 % (ref 0–2)
LDLC SERPL CALC-MCNC: 75 MG/DL (ref 0–100)
LYMPHOCYTES # BLD AUTO: 1.83 THOUSANDS/ΜL (ref 0.6–4.47)
LYMPHOCYTES NFR BLD AUTO: 24 % (ref 14–44)
MCH RBC QN AUTO: 30.5 PG (ref 26.8–34.3)
MCHC RBC AUTO-ENTMCNC: 32.5 G/DL (ref 31.4–37.4)
MCV RBC AUTO: 94 FL (ref 82–98)
MONOCYTES # BLD AUTO: 1.18 THOUSAND/ΜL (ref 0.17–1.22)
MONOCYTES NFR BLD AUTO: 16 % (ref 4–12)
NEUTROPHILS # BLD AUTO: 4.1 THOUSANDS/ΜL (ref 1.85–7.62)
NEUTS SEG NFR BLD AUTO: 53 % (ref 43–75)
NONHDLC SERPL-MCNC: 97 MG/DL
NRBC BLD AUTO-RTO: 0 /100 WBCS
PLATELET # BLD AUTO: 406 THOUSANDS/UL (ref 149–390)
PMV BLD AUTO: 9 FL (ref 8.9–12.7)
POTASSIUM SERPL-SCNC: 4.3 MMOL/L (ref 3.5–5.3)
PROT SERPL-MCNC: 6.7 G/DL (ref 6.4–8.4)
PSA SERPL-MCNC: 1.25 NG/ML (ref 0–4)
RBC # BLD AUTO: 4.86 MILLION/UL (ref 3.88–5.62)
SODIUM SERPL-SCNC: 142 MMOL/L (ref 135–147)
TRIGL SERPL-MCNC: 109 MG/DL (ref ?–150)
WBC # BLD AUTO: 7.61 THOUSAND/UL (ref 4.31–10.16)

## 2025-01-09 PROCEDURE — G0103 PSA SCREENING: HCPCS

## 2025-01-09 PROCEDURE — 36415 COLL VENOUS BLD VENIPUNCTURE: CPT

## 2025-01-15 ENCOUNTER — OFFICE VISIT (OUTPATIENT)
Dept: FAMILY MEDICINE CLINIC | Facility: CLINIC | Age: 60
End: 2025-01-15
Payer: COMMERCIAL

## 2025-01-15 VITALS
HEIGHT: 67 IN | OXYGEN SATURATION: 98 % | RESPIRATION RATE: 17 BRPM | HEART RATE: 77 BPM | WEIGHT: 220 LBS | DIASTOLIC BLOOD PRESSURE: 78 MMHG | BODY MASS INDEX: 34.53 KG/M2 | SYSTOLIC BLOOD PRESSURE: 128 MMHG | TEMPERATURE: 98.1 F

## 2025-01-15 DIAGNOSIS — Z00.00 ANNUAL PHYSICAL EXAM: Primary | ICD-10-CM

## 2025-01-15 DIAGNOSIS — E66.811 CLASS 1 OBESITY DUE TO EXCESS CALORIES WITHOUT SERIOUS COMORBIDITY WITH BODY MASS INDEX (BMI) OF 34.0 TO 34.9 IN ADULT: ICD-10-CM

## 2025-01-15 DIAGNOSIS — K22.70 BARRETT'S ESOPHAGUS WITHOUT DYSPLASIA: ICD-10-CM

## 2025-01-15 DIAGNOSIS — H93.13 TINNITUS OF BOTH EARS: ICD-10-CM

## 2025-01-15 DIAGNOSIS — E66.09 CLASS 1 OBESITY DUE TO EXCESS CALORIES WITHOUT SERIOUS COMORBIDITY WITH BODY MASS INDEX (BMI) OF 34.0 TO 34.9 IN ADULT: ICD-10-CM

## 2025-01-15 DIAGNOSIS — K51.019 ULCERATIVE PANCOLITIS WITH COMPLICATION (HCC): ICD-10-CM

## 2025-01-15 DIAGNOSIS — I10 ESSENTIAL HYPERTENSION: ICD-10-CM

## 2025-01-15 PROCEDURE — 99396 PREV VISIT EST AGE 40-64: CPT | Performed by: FAMILY MEDICINE

## 2025-01-15 PROCEDURE — 99214 OFFICE O/P EST MOD 30 MIN: CPT | Performed by: FAMILY MEDICINE

## 2025-01-15 RX ORDER — LOSARTAN POTASSIUM 100 MG/1
100 TABLET ORAL DAILY
Qty: 100 TABLET | Refills: 1 | Status: SHIPPED | OUTPATIENT
Start: 2025-01-15

## 2025-01-15 NOTE — ASSESSMENT & PLAN NOTE
Continues to have tinnitus L>R  Audiogram 12/21/23 ([facility])  Right mild downslope to mod SNHL  Left mild downslope to severe SNHL  He missed his last ENT appt  To f/u with ENT

## 2025-01-15 NOTE — PROGRESS NOTES
Adult Annual Physical  Name: Gerald Snow      : 1965      MRN: [id number]  Encounter Provider: Jayde Leal MD  Encounter Date: 1/15/2025   Encounter department: [facility]    Assessment & Plan  Annual physical exam    Orders:  •  Comprehensive metabolic panel  •  CBC and differential  •  Lipid panel    Ulcerative pancolitis with complication (HCC)  Stable on current meds  Sees GI       Essential hypertension  Stable on losartan   Labs reviewed today  Ordered labs for 4-5 months     Orders:  •  Comprehensive metabolic panel  •  CBC and differential  •  Lipid panel  •  losartan (COZAAR) 100 MG tablet; Take 1 tablet (100 mg total) by mouth daily    Anderson's esophagus without dysplasia  Recent EGD in  wad negative for metaplasia  Suspect esophagitis  Continue pantoprazole   Due for EGD this year       Tinnitus of both ears L>R  Continues to have tinnitus L>R  Audiogram 23 ([facility])  Right mild downslope to mod SNHL  Left mild downslope to severe SNHL  He missed his last ENT appt  To f/u with ENT         Class 1 obesity due to excess calories without serious comorbidity with body mass index (BMI) of 34.0 to 34.9 in adult  Diet, exercise and portion control discussed  He wants to look into weight loss injection          Immunizations and preventive care screenings were discussed with patient today. Appropriate education was printed on patient's after visit summary.    Discussed risks and benefits of prostate cancer screening. We discussed the controversial history of PSA screening for prostate cancer in the [place] as well as the risk of over detection and over treatment of prostate cancer by way of PSA screening.  The patient understands that PSA blood testing is an imperfect way to screen for prostate cancer and that elevated PSA levels in the blood may also be caused by infection, inflammation, prostatic trauma or manipulation, urological procedures, or by benign  prostatic enlargement.    The role of the digital rectal examination in prostate cancer screening was also discussed and I discussed with him that there is large interobserver variability in the findings of digital rectal examination.    Counseling:  Alcohol/drug use: discussed moderation in alcohol intake, the recommendations for healthy alcohol use, and avoidance of illicit drug use.  Dental Health: discussed importance of regular tooth brushing, flossing, and dental visits.  Injury prevention: discussed safety/seat belts, safety helmets, smoke detectors, carbon monoxide detectors, and smoking near bedding or upholstery.  Sexual health: discussed sexually transmitted diseases, partner selection, use of condoms, avoidance of unintended pregnancy, and contraceptive alternatives.  Exercise: the importance of regular exercise/physical activity was discussed. Recommend exercise 3-5 times per week for at least 30 minutes.          History of Present Illness     Struggling to lose weight    Adult Annual Physical:  Patient presents for annual physical.     Diet and Physical Activity:  - Diet/Nutrition: well balanced diet and consuming 3-5 servings of fruits/vegetables daily.    Depression Screening:  - PHQ-2 Score: 2    General Health:  - Sleep: sleeps well and 7-8 hours of sleep on average.  - Hearing: normal hearing bilateral ears.  - Vision: goes for regular eye exams.  - Dental: brushes teeth twice daily and regular dental visits.    /GYN Health:    - History of STDs: no     Health:  - History of STDs: no.     Advanced Care Planning:  - Has an advanced directive?: no    - Has a durable medical POA?: no    - ACP document given to patient?: no      Review of Systems   Constitutional: Negative.    HENT: Negative.     Eyes: Negative.    Respiratory: Negative.     Cardiovascular: Negative.    Gastrointestinal: Negative.    Endocrine: Negative.    Genitourinary: Negative.    Musculoskeletal: Negative.     Allergic/Immunologic: Negative.    Neurological: Negative.    Hematological: Negative.    Psychiatric/Behavioral: Negative.       Medical History Reviewed by provider this encounter:  Allergies  Meds  Problems     .  Past Medical History   Past Medical History:   Diagnosis Date   • Colitis    • Colitis with rectal bleeding 12/03/2018   • Colon polyp    • Hypertension    • Sleep apnea      Past Surgical History:   Procedure Laterality Date   • CARDIAC CATHETERIZATION     • COLONOSCOPY     • DC COLONOSCOPY FLX DX W/COLLJ SPEC WHEN PFRMD N/A 4/25/2016    Procedure: COLONOSCOPY;  Surgeon: Trinh Marin MD;  [facility];  Service: Gastroenterology   • DC SIGMOIDOSCOPY FLX DX W/COLLJ SPEC BR/WA IF PFRMD N/A 12/4/2018    Procedure: SIGMOIDOSCOPY FLEXIBLE;  Surgeon: Elisa Long MD;  Location[facility];  Service: Gastroenterology     Family History   Problem Relation Age of Onset   • Diabetes Mother    • Heart disease Father         Cardiac Disorder   • Heart disease Brother         Cardiac Disorder   • Eczema Sister       reports that he has quit smoking. He has been exposed to tobacco smoke. He has never used smokeless tobacco. He reports that he does not currently use alcohol. He reports that he does not use drugs.  Current Outpatient Medications on File Prior to Visit   Medication Sig Dispense Refill   • ergocalciferol (ERGOCALCIFEROL) 1.25 MG (00636 UT) capsule Take 1 capsule (50,000 Units total) by mouth once a week 12 capsule 0   • meclizine (ANTIVERT) 25 mg tablet Take 1 tablet (25 mg total) by mouth every 8 (eight) hours as needed for dizziness 30 tablet 0   • pantoprazole (PROTONIX) 40 mg tablet TAKE 1 TABLET BY MOUTH EVERY DAY 90 tablet 3   • Tofacitinib Citrate ER (Xeljanz XR) 11 MG TB24 TAKE 1 TABLET BY MOUTH DAILY 30 tablet 5   • [DISCONTINUED] losartan (COZAAR) 100 MG tablet TAKE 1 TABLET BY MOUTH EVERY  tablet 1     No current facility-administered medications on file prior to  "visit.     Allergies   Allergen Reactions   • Humira [Adalimumab] Rash      Current Outpatient Medications on File Prior to Visit   Medication Sig Dispense Refill   • ergocalciferol (ERGOCALCIFEROL) 1.25 MG (98976 UT) capsule Take 1 capsule (50,000 Units total) by mouth once a week 12 capsule 0   • meclizine (ANTIVERT) 25 mg tablet Take 1 tablet (25 mg total) by mouth every 8 (eight) hours as needed for dizziness 30 tablet 0   • pantoprazole (PROTONIX) 40 mg tablet TAKE 1 TABLET BY MOUTH EVERY DAY 90 tablet 3   • Tofacitinib Citrate ER (Xeljanz XR) 11 MG TB24 TAKE 1 TABLET BY MOUTH DAILY 30 tablet 5   • [DISCONTINUED] losartan (COZAAR) 100 MG tablet TAKE 1 TABLET BY MOUTH EVERY  tablet 1     No current facility-administered medications on file prior to visit.      Social History     Tobacco Use   • Smoking status: Former     Passive exposure: Past   • Smokeless tobacco: Never   • Tobacco comments:     QUIT 2018   Vaping Use   • Vaping status: Every Day   • Substances: Nicotine, Flavoring   Substance and Sexual Activity   • Alcohol use: Not Currently     Comment: 2 per month   • Drug use: No   • Sexual activity: Not on file       Objective   /78 (BP Location: Left arm, Patient Position: Sitting, Cuff Size: Standard)   Pulse 77   Temp 98.1 °F (36.7 °C) (Tympanic)   Resp 17   Ht 5' 7\" (1.702 m)   Wt 99.8 kg (220 lb)   SpO2 98%   BMI 34.46 kg/m²     Physical Exam  Vitals and nursing note reviewed.   Constitutional:       Appearance: Normal appearance.   HENT:      Head: Normocephalic and atraumatic.      Right Ear: Tympanic membrane normal.      Left Ear: Tympanic membrane normal.      Nose: Nose normal.      Mouth/Throat:      Mouth: Mucous membranes are moist.   Eyes:      Pupils: Pupils are equal, round, and reactive to light.   Cardiovascular:      Rate and Rhythm: Normal rate and regular rhythm.      Pulses: Normal pulses.      Heart sounds: Normal heart sounds.   Pulmonary:      Effort: " Pulmonary effort is normal.      Breath sounds: Normal breath sounds.   Genitourinary:     Penis: Normal.    Musculoskeletal:         General: Normal range of motion.      Cervical back: Normal range of motion and neck supple.   Skin:     General: Skin is warm.      Capillary Refill: Capillary refill takes less than 2 seconds.   Neurological:      General: No focal deficit present.      Mental Status: He is alert and oriented to person, place, and time.   Psychiatric:         Mood and Affect: Mood normal.         Behavior: Behavior normal.

## 2025-01-15 NOTE — ASSESSMENT & PLAN NOTE
Stable on losartan   Labs reviewed today  Ordered labs for 4-5 months     Orders:  •  Comprehensive metabolic panel  •  CBC and differential  •  Lipid panel  •  losartan (COZAAR) 100 MG tablet; Take 1 tablet (100 mg total) by mouth daily

## 2025-01-15 NOTE — ASSESSMENT & PLAN NOTE
Recent EGD in 2024 wad negative for metaplasia  Suspect esophagitis  Continue pantoprazole   Due for EGD this year

## 2025-02-28 ENCOUNTER — OFFICE VISIT (OUTPATIENT)
Dept: GASTROENTEROLOGY | Facility: AMBULARY SURGERY CENTER | Age: 60
End: 2025-02-28
Payer: COMMERCIAL

## 2025-02-28 VITALS
WEIGHT: 222 LBS | OXYGEN SATURATION: 97 % | SYSTOLIC BLOOD PRESSURE: 138 MMHG | HEART RATE: 68 BPM | BODY MASS INDEX: 34.84 KG/M2 | HEIGHT: 67 IN | DIASTOLIC BLOOD PRESSURE: 84 MMHG

## 2025-02-28 DIAGNOSIS — K21.00 GASTROESOPHAGEAL REFLUX DISEASE WITH ESOPHAGITIS WITHOUT HEMORRHAGE: ICD-10-CM

## 2025-02-28 DIAGNOSIS — K62.5 RECTAL BLEEDING: ICD-10-CM

## 2025-02-28 DIAGNOSIS — R14.0 ABDOMINAL BLOATING: ICD-10-CM

## 2025-02-28 DIAGNOSIS — K76.0 HEPATIC STEATOSIS: ICD-10-CM

## 2025-02-28 DIAGNOSIS — K51.019 ULCERATIVE PANCOLITIS WITH COMPLICATION (HCC): Primary | ICD-10-CM

## 2025-02-28 PROCEDURE — 99214 OFFICE O/P EST MOD 30 MIN: CPT | Performed by: INTERNAL MEDICINE

## 2025-02-28 RX ORDER — SODIUM CHLORIDE, SODIUM LACTATE, POTASSIUM CHLORIDE, CALCIUM CHLORIDE 600; 310; 30; 20 MG/100ML; MG/100ML; MG/100ML; MG/100ML
125 INJECTION, SOLUTION INTRAVENOUS CONTINUOUS
OUTPATIENT
Start: 2025-02-28

## 2025-02-28 NOTE — PATIENT INSTRUCTIONS
Scheduled date of EGD(as of today): 3/21/2025  Physician performing EGD: Dr. Long  Location of EGD: [facility]  Instructions reviewed with patient by: Tiara  Clearances: N/A

## 2025-02-28 NOTE — PROGRESS NOTES
"Name: Gerald Snow      : 1965      MRN: [id number]  Encounter Provider: Elisa Long MD  Encounter Date: 2025   Encounter department: [facility]  :  Assessment & Plan  Ulcerative pancolitis with complication (HCC)  -Developed rash secondary to Stelara.  Also reported dermatitis due to Humira.  -On tofacitinib, cholesterol within normal range.  Neutrophils normal.  Liver enzymes normal.  -Will be due for repeat colonoscopy for surveillance in 2026.  -Check gold QuantiFERON, check hepatitis panel.  -Recommend annual dermatology follow-up.  -Recommend annual COVID-vaccine and influenza vaccine.  -Check vitamin D levels.  -Avoid live vaccines.    -Health Maintenance:     Vaccines:  1.)  Influenza vaccine (\"Flu shot\") -              Not done this year  2.)  Pneumococcal Pneumonia (Pneumovax-23) - Done   3.)  Pneumococcal Pneumonia  (Prevnar-13) -      Not Done  4.)   Herpes Zoster (Shingles)              Not Done  Followup:  5.)  Dermatologic evaluation-            Not Done  6.)  Pap Smear -                 Not Done   7.)  DEXA scan -                 Last done      Orders:    Quantiferon TB Gold Plus Assay; Future    Chronic Hepatitis Panel; Future    Calprotectin,Fecal; Future    Vitamin D 25 hydroxy; Future    Gastroesophageal reflux disease with esophagitis without hemorrhage  -Continue PPI, noted to have increased eosinophils on distal esophageal biopsies 1 year ago, endoscopic appearance of Anderson's.  Patient has been recommended to continue PPI.  He is on Protonix 40 mg a daily basis.-Would recommend EGD at this time.  Continue to follow GERD diet  Avoid NSAIDs.    Orders:    EGD; Future    Hepatic steatosis  -Low fib 4 score at 0.52.  -Recommend healthy weight loss as BMI approaching 35.    -Recommend ultrasound elastography.  Orders:    US elastography; Future    Rectal bleeding  -Reports several episodes of rectal bleeding after hard stools.  " Suspect likely outlet bleeding from hemorrhoids.  Would recommend checking fecal calprotectin to assess for inflammation.  If fecal calprotectin is significantly elevated, would recommend colonoscopy the same time as EGD otherwise will defer colonoscopy until next year when patient is due for surveillance.  -Recommend fiber supplement on daily basis.  -Avoid straining.         Abdominal bloating  -Imaging from 2018 was reviewed.  No recent abdominal cross-sectional imaging.  There was evidence of hepatomegaly.  Also CAT scan previously was notable for colitis.  -No unintentional weight loss, no anemia, reports rectal bleeding however suspect likely hemorrhoidal, will check fecal calprotectin.  If abdominal bloating worsens, has any other alarm symptoms, would recommend obtaining cross-sectional imaging.  I would recommend obtaining ultrasound elastography given previously noted to have hepatic steatosis.  Fortunately, clinically no evidence of ascites or peripheral edema.           History of Present Illness   HPI  Gerald Snow is a 60 y.o. male with history of ulcerative pancolitis with active inflammation in the sigmoid previously, with prior rectal biopsies being indefinite for dysplasia ( 5/2020).  Most recent colonoscopy 1 year ago  ( 1/2024) without any evidence of dysplasia on segmental biopsies.  Failed mesalamine, stopped Humira due to dermatitis, also stopped Stelara due to development of rash.  Has been on tafasitamab for year and a half.  Has not followed up in over a year since his last colonoscopy.  Patient reports that he is generally feeling well however reports that he has been having a difficult time losing weight, also reports occasional abdominal bloating.  Patient does report having several episodes of rectal bleeding.  He describes them as bright red blood.  No abdominal pain.  No unintentional weight loss.  No urgency or tenesmus.    Most recent blood work by PCP notable for normal  hemoglobin, normal WBC and mild elevation of platelets.  Cholesterol normal at 139.  Liver enzymes within normal range with AST and ALT of 22 and 40, bilirubin and alkaline phosphatase also normal.        Review of Systems   Constitutional: Negative.    HENT: Negative.     Eyes: Negative.    Respiratory: Negative.     Cardiovascular: Negative.    Gastrointestinal:         See HPI.   Endocrine: Negative.    Genitourinary: Negative.    Musculoskeletal: Negative.    Skin: Negative.    Allergic/Immunologic: Negative.    Neurological: Negative.    Hematological: Negative.    Psychiatric/Behavioral: Negative.     All other systems reviewed and are negative.         Objective   There were no vitals taken for this visit.     Physical Exam

## 2025-02-28 NOTE — H&P (VIEW-ONLY)
"Name: Gerald Snow      : 1965      MRN: [id number]  Encounter Provider: Elisa Long MD  Encounter Date: 2025   Encounter department: [facility]  :  Assessment & Plan  Ulcerative pancolitis with complication (HCC)  -Developed rash secondary to Stelara.  Also reported dermatitis due to Humira.  -On tofacitinib, cholesterol within normal range.  Neutrophils normal.  Liver enzymes normal.  -Will be due for repeat colonoscopy for surveillance in 2026.  -Check gold QuantiFERON, check hepatitis panel.  -Recommend annual dermatology follow-up.  -Recommend annual COVID-vaccine and influenza vaccine.  -Check vitamin D levels.  -Avoid live vaccines.    -Health Maintenance:     Vaccines:  1.)  Influenza vaccine (\"Flu shot\") -              Not done this year  2.)  Pneumococcal Pneumonia (Pneumovax-23) - Done   3.)  Pneumococcal Pneumonia  (Prevnar-13) -      Not Done  4.)   Herpes Zoster (Shingles)              Not Done  Followup:  5.)  Dermatologic evaluation-            Not Done  6.)  Pap Smear -                 Not Done   7.)  DEXA scan -                 Last done      Orders:    Quantiferon TB Gold Plus Assay; Future    Chronic Hepatitis Panel; Future    Calprotectin,Fecal; Future    Vitamin D 25 hydroxy; Future    Gastroesophageal reflux disease with esophagitis without hemorrhage  -Continue PPI, noted to have increased eosinophils on distal esophageal biopsies 1 year ago, endoscopic appearance of Anderson's.  Patient has been recommended to continue PPI.  He is on Protonix 40 mg a daily basis.-Would recommend EGD at this time.  Continue to follow GERD diet  Avoid NSAIDs.    Orders:    EGD; Future    Hepatic steatosis  -Low fib 4 score at 0.52.  -Recommend healthy weight loss as BMI approaching 35.    -Recommend ultrasound elastography.  Orders:    US elastography; Future    Rectal bleeding  -Reports several episodes of rectal bleeding after hard stools.  " Suspect likely outlet bleeding from hemorrhoids.  Would recommend checking fecal calprotectin to assess for inflammation.  If fecal calprotectin is significantly elevated, would recommend colonoscopy the same time as EGD otherwise will defer colonoscopy until next year when patient is due for surveillance.  -Recommend fiber supplement on daily basis.  -Avoid straining.         Abdominal bloating  -Imaging from 2018 was reviewed.  No recent abdominal cross-sectional imaging.  There was evidence of hepatomegaly.  Also CAT scan previously was notable for colitis.  -No unintentional weight loss, no anemia, reports rectal bleeding however suspect likely hemorrhoidal, will check fecal calprotectin.  If abdominal bloating worsens, has any other alarm symptoms, would recommend obtaining cross-sectional imaging.  I would recommend obtaining ultrasound elastography given previously noted to have hepatic steatosis.  Fortunately, clinically no evidence of ascites or peripheral edema.           History of Present Illness   HPI  Gerald Snow is a 60 y.o. male with history of ulcerative pancolitis with active inflammation in the sigmoid previously, with prior rectal biopsies being indefinite for dysplasia ( 5/2020).  Most recent colonoscopy 1 year ago  ( 1/2024) without any evidence of dysplasia on segmental biopsies.  Failed mesalamine, stopped Humira due to dermatitis, also stopped Stelara due to development of rash.  Has been on tafasitamab for year and a half.  Has not followed up in over a year since his last colonoscopy.  Patient reports that he is generally feeling well however reports that he has been having a difficult time losing weight, also reports occasional abdominal bloating.  Patient does report having several episodes of rectal bleeding.  He describes them as bright red blood.  No abdominal pain.  No unintentional weight loss.  No urgency or tenesmus.    Most recent blood work by PCP notable for normal  hemoglobin, normal WBC and mild elevation of platelets.  Cholesterol normal at 139.  Liver enzymes within normal range with AST and ALT of 22 and 40, bilirubin and alkaline phosphatase also normal.        Review of Systems   Constitutional: Negative.    HENT: Negative.     Eyes: Negative.    Respiratory: Negative.     Cardiovascular: Negative.    Gastrointestinal:         See HPI.   Endocrine: Negative.    Genitourinary: Negative.    Musculoskeletal: Negative.    Skin: Negative.    Allergic/Immunologic: Negative.    Neurological: Negative.    Hematological: Negative.    Psychiatric/Behavioral: Negative.     All other systems reviewed and are negative.         Objective   There were no vitals taken for this visit.     Physical Exam

## 2025-02-28 NOTE — ASSESSMENT & PLAN NOTE
"-Developed rash secondary to Stelara.  Also reported dermatitis due to Humira.  -On tofacitinib, cholesterol within normal range.  Neutrophils normal.  Liver enzymes normal.  -Will be due for repeat colonoscopy for surveillance in January 2026.  -Check gold QuantiFERON, check hepatitis panel.  -Recommend annual dermatology follow-up.  -Recommend annual COVID-vaccine and influenza vaccine.  -Check vitamin D levels.  -Avoid live vaccines.    -Health Maintenance:     Vaccines:  1.)  Influenza vaccine (\"Flu shot\") -              Not done this year  2.)  Pneumococcal Pneumonia (Pneumovax-23) - Done   3.)  Pneumococcal Pneumonia  (Prevnar-13) -      Not Done  4.)   Herpes Zoster (Shingles)              Not Done  Followup:  5.)  Dermatologic evaluation-            Not Done  6.)  Pap Smear -                 Not Done   7.)  DEXA scan -                 Last done      Orders:    Quantiferon TB Gold Plus Assay; Future    Chronic Hepatitis Panel; Future    Calprotectin,Fecal; Future    Vitamin D 25 hydroxy; Future    "

## 2025-03-07 ENCOUNTER — ANESTHESIA (OUTPATIENT)
Dept: ANESTHESIOLOGY | Facility: HOSPITAL | Age: 60
End: 2025-03-07

## 2025-03-07 ENCOUNTER — ANESTHESIA EVENT (OUTPATIENT)
Dept: ANESTHESIOLOGY | Facility: HOSPITAL | Age: 60
End: 2025-03-07

## 2025-03-12 ENCOUNTER — HOSPITAL ENCOUNTER (OUTPATIENT)
Dept: ULTRASOUND IMAGING | Facility: HOSPITAL | Age: 60
Discharge: HOME | End: 2025-03-12
Attending: INTERNAL MEDICINE
Payer: COMMERCIAL

## 2025-03-12 ENCOUNTER — APPOINTMENT (OUTPATIENT)
Dept: LAB | Facility: CLINIC | Age: 60
End: 2025-03-12
Payer: COMMERCIAL

## 2025-03-12 DIAGNOSIS — K76.0 HEPATIC STEATOSIS: ICD-10-CM

## 2025-03-12 DIAGNOSIS — K51.019 ULCERATIVE PANCOLITIS WITH COMPLICATION (HCC): ICD-10-CM

## 2025-03-12 LAB
25(OH)D3 SERPL-MCNC: 27.3 NG/ML (ref 30–100)
HBV CORE AB SER QL: NORMAL
HBV CORE IGM SER QL: NORMAL
HBV SURFACE AG SER QL: NORMAL
HCV AB SER QL: NORMAL

## 2025-03-12 PROCEDURE — 82306 VITAMIN D 25 HYDROXY: CPT

## 2025-03-12 PROCEDURE — 36415 COLL VENOUS BLD VENIPUNCTURE: CPT

## 2025-03-12 PROCEDURE — 87340 HEPATITIS B SURFACE AG IA: CPT

## 2025-03-12 PROCEDURE — 86704 HEP B CORE ANTIBODY TOTAL: CPT

## 2025-03-12 PROCEDURE — 76981 USE PARENCHYMA: CPT

## 2025-03-12 PROCEDURE — 86803 HEPATITIS C AB TEST: CPT

## 2025-03-12 PROCEDURE — 86480 TB TEST CELL IMMUN MEASURE: CPT

## 2025-03-12 PROCEDURE — 86705 HEP B CORE ANTIBODY IGM: CPT

## 2025-03-13 LAB
GAMMA INTERFERON BACKGROUND BLD IA-ACNC: 0.04 IU/ML
M TB IFN-G BLD-IMP: NEGATIVE
M TB IFN-G CD4+ BCKGRND COR BLD-ACNC: 0 IU/ML
M TB IFN-G CD4+ BCKGRND COR BLD-ACNC: 0.01 IU/ML
MITOGEN IGNF BCKGRD COR BLD-ACNC: 3.99 IU/ML

## 2025-03-17 ENCOUNTER — RESULTS FOLLOW-UP (OUTPATIENT)
Dept: GASTROENTEROLOGY | Facility: AMBULARY SURGERY CENTER | Age: 60
End: 2025-03-17

## 2025-03-18 DIAGNOSIS — E55.9 VITAMIN D INSUFFICIENCY: Primary | ICD-10-CM

## 2025-03-21 ENCOUNTER — HOSPITAL ENCOUNTER (OUTPATIENT)
Dept: GASTROENTEROLOGY | Facility: AMBULARY SURGERY CENTER | Age: 60
Setting detail: OUTPATIENT SURGERY
End: 2025-03-21
Attending: INTERNAL MEDICINE
Payer: COMMERCIAL

## 2025-03-21 ENCOUNTER — ANESTHESIA (OUTPATIENT)
Dept: GASTROENTEROLOGY | Facility: AMBULARY SURGERY CENTER | Age: 60
End: 2025-03-21
Payer: COMMERCIAL

## 2025-03-21 VITALS
DIASTOLIC BLOOD PRESSURE: 85 MMHG | RESPIRATION RATE: 18 BRPM | TEMPERATURE: 96.8 F | OXYGEN SATURATION: 98 % | HEART RATE: 60 BPM | SYSTOLIC BLOOD PRESSURE: 126 MMHG | WEIGHT: 214 LBS | BODY MASS INDEX: 33.59 KG/M2 | HEIGHT: 67 IN

## 2025-03-21 DIAGNOSIS — K21.00 GASTROESOPHAGEAL REFLUX DISEASE WITH ESOPHAGITIS WITHOUT HEMORRHAGE: ICD-10-CM

## 2025-03-21 PROCEDURE — 88305 TISSUE EXAM BY PATHOLOGIST: CPT | Performed by: STUDENT IN AN ORGANIZED HEALTH CARE EDUCATION/TRAINING PROGRAM

## 2025-03-21 PROCEDURE — 43239 EGD BIOPSY SINGLE/MULTIPLE: CPT | Performed by: INTERNAL MEDICINE

## 2025-03-21 PROCEDURE — 88342 IMHCHEM/IMCYTCHM 1ST ANTB: CPT | Performed by: STUDENT IN AN ORGANIZED HEALTH CARE EDUCATION/TRAINING PROGRAM

## 2025-03-21 RX ORDER — PROPOFOL 10 MG/ML
INJECTION, EMULSION INTRAVENOUS AS NEEDED
Status: DISCONTINUED | OUTPATIENT
Start: 2025-03-21 | End: 2025-03-21

## 2025-03-21 RX ORDER — LIDOCAINE HYDROCHLORIDE 10 MG/ML
INJECTION, SOLUTION EPIDURAL; INFILTRATION; INTRACAUDAL; PERINEURAL AS NEEDED
Status: DISCONTINUED | OUTPATIENT
Start: 2025-03-21 | End: 2025-03-21

## 2025-03-21 RX ORDER — SODIUM CHLORIDE, SODIUM LACTATE, POTASSIUM CHLORIDE, CALCIUM CHLORIDE 600; 310; 30; 20 MG/100ML; MG/100ML; MG/100ML; MG/100ML
INJECTION, SOLUTION INTRAVENOUS CONTINUOUS PRN
Status: DISCONTINUED | OUTPATIENT
Start: 2025-03-21 | End: 2025-03-21

## 2025-03-21 RX ADMIN — LIDOCAINE HYDROCHLORIDE 50 MG: 10 INJECTION, SOLUTION EPIDURAL; INFILTRATION; INTRACAUDAL at 10:20

## 2025-03-21 RX ADMIN — PROPOFOL 150 MG: 10 INJECTION, EMULSION INTRAVENOUS at 10:20

## 2025-03-21 RX ADMIN — SODIUM CHLORIDE, SODIUM LACTATE, POTASSIUM CHLORIDE, AND CALCIUM CHLORIDE: .6; .31; .03; .02 INJECTION, SOLUTION INTRAVENOUS at 09:45

## 2025-03-21 RX ADMIN — PROPOFOL 30 MG: 10 INJECTION, EMULSION INTRAVENOUS at 10:22

## 2025-03-21 NOTE — ANESTHESIA POSTPROCEDURE EVALUATION
Post-Op Assessment Note    CV Status:  Stable  Pain Score: 0    Pain management: adequate       Mental Status:  Alert and awake   Hydration Status:  Euvolemic   PONV Controlled:  Controlled   Airway Patency:  Patent     Post Op Vitals Reviewed: Yes    No anethesia notable event occurred.    Staff: CRNA           Last Filed PACU Vitals:  Vitals Value Taken Time   Temp  Rn temp   Pulse  68   BP  126/82   Resp  17   SpO2  98% 6Lo2 mask

## 2025-03-21 NOTE — INTERVAL H&P NOTE
H&P reviewed. After examining the patient I find no changes in the patients condition since the H&P had been written.    Vitals:    03/21/25 0934   BP: (!) 151/103   Pulse: 69   Resp: 18   Temp: (!) 96.5 °F (35.8 °C)   SpO2: 99%

## 2025-03-21 NOTE — ANESTHESIA PREPROCEDURE EVALUATION
Procedure:  EGD    Relevant Problems   CARDIO   (+) Essential hypertension      MUSCULOSKELETAL   (+) Gout      PULMONARY   (+) JOSE (obstructive sleep apnea)      NPO 9pm 3/20    Physical Exam    Airway    Mallampati score: IV  TM Distance: >3 FB  Neck ROM: full     Dental       Cardiovascular  Cardiovascular exam normal    Pulmonary  Pulmonary exam normal     Other Findings        Anesthesia Plan  ASA Score- 3     Anesthesia Type- IV sedation with anesthesia with ASA Monitors.         Additional Monitors:     Airway Plan:            Plan Factors-Exercise tolerance (METS): >4 METS.    Chart reviewed. EKG reviewed.  Existing labs reviewed. Patient summary reviewed.    Patient is not a current smoker.  Patient did not smoke on day of surgery.    Obstructive sleep apnea risk education given perioperatively.        Induction- intravenous.    Postoperative Plan-     Perioperative Resuscitation Plan - Level 1 - Full Code.       Informed Consent- Anesthetic plan and risks discussed with patient.  I personally reviewed this patient with the CRNA. Discussed and agreed on the Anesthesia Plan with the CRNA..      NPO Status:  Vitals Value Taken Time   Date of last liquid 03/20/25 03/21/25 0932   Time of last liquid 2100 03/21/25 0932   Date of last solid 03/20/25 03/21/25 0932   Time of last solid 1800 03/21/25 0932

## 2025-03-27 PROCEDURE — 88305 TISSUE EXAM BY PATHOLOGIST: CPT | Performed by: STUDENT IN AN ORGANIZED HEALTH CARE EDUCATION/TRAINING PROGRAM

## 2025-03-27 PROCEDURE — 88342 IMHCHEM/IMCYTCHM 1ST ANTB: CPT | Performed by: STUDENT IN AN ORGANIZED HEALTH CARE EDUCATION/TRAINING PROGRAM

## 2025-03-28 ENCOUNTER — RESULTS FOLLOW-UP (OUTPATIENT)
Age: 60
End: 2025-03-28

## 2025-03-28 DIAGNOSIS — E55.9 VITAMIN D INSUFFICIENCY: ICD-10-CM

## 2025-03-28 NOTE — TELEPHONE ENCOUNTER
----- Message from Elisa Long MD sent at 3/28/2025 10:36 AM EDT -----  Continue PPI indefinitely for Anderson's esophagus.  Repeat EGD in 3 years for surveillance of Anderson's.

## 2025-03-28 NOTE — TELEPHONE ENCOUNTER
Attempted to call pt regarding EGD results and provider recommendations, however, I received voicemail. Advised to return call to office to discuss. 3 year EGD recall set.     Please, relay these results & vitamin D supplementation recommendations on previous pt message if pt returns call. Thanks!

## 2025-04-18 ENCOUNTER — OFFICE VISIT (OUTPATIENT)
Dept: FAMILY MEDICINE CLINIC | Facility: CLINIC | Age: 60
End: 2025-04-18
Payer: COMMERCIAL

## 2025-04-18 VITALS
DIASTOLIC BLOOD PRESSURE: 80 MMHG | WEIGHT: 219 LBS | SYSTOLIC BLOOD PRESSURE: 130 MMHG | OXYGEN SATURATION: 98 % | BODY MASS INDEX: 34.37 KG/M2 | TEMPERATURE: 98.1 F | HEART RATE: 84 BPM | HEIGHT: 67 IN | RESPIRATION RATE: 17 BRPM

## 2025-04-18 DIAGNOSIS — E66.811 CLASS 1 OBESITY DUE TO EXCESS CALORIES WITH SERIOUS COMORBIDITY AND BODY MASS INDEX (BMI) OF 34.0 TO 34.9 IN ADULT: Primary | ICD-10-CM

## 2025-04-18 DIAGNOSIS — K51.019 ULCERATIVE PANCOLITIS WITH COMPLICATION (HCC): ICD-10-CM

## 2025-04-18 DIAGNOSIS — G47.33 OSA (OBSTRUCTIVE SLEEP APNEA): ICD-10-CM

## 2025-04-18 DIAGNOSIS — E66.811 CLASS 1 DRUG-INDUCED OBESITY WITH SERIOUS COMORBIDITY AND BODY MASS INDEX (BMI) OF 34.0 TO 34.9 IN ADULT: Primary | ICD-10-CM

## 2025-04-18 DIAGNOSIS — K22.70 BARRETT'S ESOPHAGUS WITHOUT DYSPLASIA: ICD-10-CM

## 2025-04-18 DIAGNOSIS — E66.1 CLASS 1 DRUG-INDUCED OBESITY WITH SERIOUS COMORBIDITY AND BODY MASS INDEX (BMI) OF 34.0 TO 34.9 IN ADULT: Primary | ICD-10-CM

## 2025-04-18 DIAGNOSIS — M10.00 IDIOPATHIC GOUT, UNSPECIFIED CHRONICITY, UNSPECIFIED SITE: ICD-10-CM

## 2025-04-18 DIAGNOSIS — R73.03 PREDIABETES: ICD-10-CM

## 2025-04-18 DIAGNOSIS — I10 ESSENTIAL HYPERTENSION: ICD-10-CM

## 2025-04-18 DIAGNOSIS — E66.09 CLASS 1 OBESITY DUE TO EXCESS CALORIES WITH SERIOUS COMORBIDITY AND BODY MASS INDEX (BMI) OF 34.0 TO 34.9 IN ADULT: Primary | ICD-10-CM

## 2025-04-18 PROCEDURE — 99214 OFFICE O/P EST MOD 30 MIN: CPT | Performed by: FAMILY MEDICINE

## 2025-04-18 NOTE — PROGRESS NOTES
Name: Gerald Snow      : 1965      MRN: [id number]  Encounter Provider: Jayde Leal MD  Encounter Date: 2025   Encounter department: [facility]  :  Assessment & Plan  Class 1 drug-induced obesity with serious comorbidity and body mass index (BMI) of 34.0 to 34.9 in adult  Prior Authorization Clinical Questions for Weight Management Pharmacotherapy    1. Does the patient have a contrainidcation to medication prescribed for weight management?: No  2. Does the patient have a diagnosis of obesity, confirmed by a BMI greater than or equal to 30 kg/m^2?: Yes  3. Does the patient have a BMI of greater than or equal to 27 kg/m^2 with at least one weight-related comorbidity/risk factor/complication (e.g. diabetes, dyslipidemia, coronary artery disease)?: Yes  5. WEGOVY CVA Indication: Does patient have established documented cardiovascular disease (history of a prior heart attack (myocardial infarction), stroke, or symptomatic peripheral arterial disease (PAD)?: N/A  6. ZEPBOUND JOSE Indication: Does patient have documented JOSE diagnosed via sleep study (insurance will require copy of sleep study results for approval)?: N/A  7. Has the patient been on a weight loss regimen of low-calorie diet, increased physical activity, and lifestyle modifications for a minimum of 6 months?: Yes  8. Has the patient completed a comprehensive weight loss program (ie, Weight Watchers, Noom, Bariatrics, other david on phone)? If so, what?: No  9. Does the patient have a history of type 2 diabetes?: No  10. Has the member tried and failed other weight loss medication within the past 12 months?: No  11. Will the member use requested medication in combination with another GLP agonist or weight loss drug?: No  12. Is the medication a controlled substance?: No  For renewals: Has the patient had a positive outcome with current weight management medication (i.e., change in body weight of at least 4-5% after 12-16  "weeks on maximally tolerated dose)?: No     Baseline weight (in pounds): 219 lbs     Doing food diary and eating healthy   Still trouble losing weight   To look into wegovy or zepbound with his insurance and let me know        Idiopathic gout, unspecified chronicity, unspecified site  No recent issues          Ulcerative pancolitis with complication (HCC)  Doing well on Xeljianz  Care per GI       JOSE (obstructive sleep apnea)  Doesn't use CPAP machine at this time as he cannot tolerate it         Anderson's esophagus without dysplasia  Confirmed on recent EGD  To take protonix daily       Essential hypertension  Stable on losartan  Ordered labs  Orders:  •  Comprehensive metabolic panel  •  CBC and differential  •  Lipid panel    Prediabetes  Weight loss will help    Orders:  •  Hemoglobin A1C           History of Present Illness   HPI  Here for follow up  Feels well overall   Didn't do labs       Review of Systems   Constitutional: Negative.    HENT: Negative.     Eyes: Negative.    Respiratory: Negative.     Cardiovascular: Negative.    Endocrine: Negative.    Musculoskeletal: Negative.    Allergic/Immunologic: Negative.    Hematological: Negative.        Objective   /80 (BP Location: Left arm, Patient Position: Sitting, Cuff Size: Standard)   Pulse 84   Temp 98.1 °F (36.7 °C) (Tympanic)   Resp 17   Ht 5' 7\" (1.702 m)   Wt 99.3 kg (219 lb)   SpO2 98%   BMI 34.30 kg/m²      Physical Exam  Vitals and nursing note reviewed.   Constitutional:       Appearance: Normal appearance.   Cardiovascular:      Rate and Rhythm: Normal rate and regular rhythm.      Pulses: Normal pulses.      Heart sounds: Normal heart sounds.   Pulmonary:      Effort: Pulmonary effort is normal.      Breath sounds: Normal breath sounds.   Neurological:      Mental Status: He is alert.         "

## 2025-04-18 NOTE — ASSESSMENT & PLAN NOTE
Prior Authorization Clinical Questions for Weight Management Pharmacotherapy    1. Does the patient have a contrainidcation to medication prescribed for weight management?: No  2. Does the patient have a diagnosis of obesity, confirmed by a BMI greater than or equal to 30 kg/m^2?: Yes  3. Does the patient have a BMI of greater than or equal to 27 kg/m^2 with at least one weight-related comorbidity/risk factor/complication (e.g. diabetes, dyslipidemia, coronary artery disease)?: Yes  5. WEGOVY CVA Indication: Does patient have established documented cardiovascular disease (history of a prior heart attack (myocardial infarction), stroke, or symptomatic peripheral arterial disease (PAD)?: N/A  6. ZEPBOUND JOSE Indication: Does patient have documented JOSE diagnosed via sleep study (insurance will require copy of sleep study results for approval)?: N/A  7. Has the patient been on a weight loss regimen of low-calorie diet, increased physical activity, and lifestyle modifications for a minimum of 6 months?: Yes  8. Has the patient completed a comprehensive weight loss program (ie, Weight Watchers, Noom, Bariatrics, other david on phone)? If so, what?: No  9. Does the patient have a history of type 2 diabetes?: No  10. Has the member tried and failed other weight loss medication within the past 12 months?: No  11. Will the member use requested medication in combination with another GLP agonist or weight loss drug?: No  12. Is the medication a controlled substance?: No  For renewals: Has the patient had a positive outcome with current weight management medication (i.e., change in body weight of at least 4-5% after 12-16 weeks on maximally tolerated dose)?: No     Baseline weight (in pounds): 219 lbs     Doing food diary and eating healthy   Still trouble losing weight   To look into wegovy or zepbound with his insurance and let me know

## 2025-04-18 NOTE — ASSESSMENT & PLAN NOTE
Stable on losartan  Ordered labs  Orders:  •  Comprehensive metabolic panel  •  CBC and differential  •  Lipid panel

## 2025-04-21 ENCOUNTER — TELEPHONE (OUTPATIENT)
Age: 60
End: 2025-04-21

## 2025-04-21 NOTE — TELEPHONE ENCOUNTER
PA for wegovy 0.25 mg/0.5 ml SUBMITTED to [facility]     via    []CMM-KEY:   [x]Surescripts-Case ID # [id number]   []Availity-Auth ID # NDC #   []Faxed to plan   []Other website   []Phone call Case ID #     []PA sent as URGENT    All office notes, labs and other pertaining documents and studies sent. Clinical questions answered. Awaiting determination from insurance company.     Turnaround time for your insurance to make a decision on your Prior Authorization can take 7-21 business days.

## 2025-04-22 NOTE — TELEPHONE ENCOUNTER
PA for wegovy 0.25 mg/0.5 ml  APPROVED     Date(s) approved  April 21, 2025 to October 21, 2025     Case # [id number]     Patient advised by          [x][employer] Message  []Phone call   [x]LMOM  []L/M to call office as no active Communication consent on file  []Unable to leave detailed message as VM not approved on Communication consent       Pharmacy advised by    [x]Fax  []Phone call  []Secure Chat    Specialty Pharmacy    []     Approval letter scanned into Media Yes

## 2025-04-24 DIAGNOSIS — K51.019 ULCERATIVE PANCOLITIS WITH COMPLICATION (HCC): ICD-10-CM

## 2025-04-24 RX ORDER — TOFACITINIB 11 MG/1
1 TABLET, FILM COATED, EXTENDED RELEASE ORAL DAILY
Qty: 30 TABLET | Refills: 5 | Status: SHIPPED | OUTPATIENT
Start: 2025-04-24

## 2025-05-05 DIAGNOSIS — E66.811 CLASS 1 OBESITY DUE TO EXCESS CALORIES WITH SERIOUS COMORBIDITY AND BODY MASS INDEX (BMI) OF 34.0 TO 34.9 IN ADULT: ICD-10-CM

## 2025-05-05 DIAGNOSIS — E66.09 CLASS 1 OBESITY DUE TO EXCESS CALORIES WITH SERIOUS COMORBIDITY AND BODY MASS INDEX (BMI) OF 34.0 TO 34.9 IN ADULT: ICD-10-CM

## 2025-05-05 DIAGNOSIS — G47.33 OSA (OBSTRUCTIVE SLEEP APNEA): ICD-10-CM

## 2025-05-08 RX ORDER — SEMAGLUTIDE 0.5 MG/.5ML
INJECTION, SOLUTION SUBCUTANEOUS
Qty: 2 ML | Refills: 0 | Status: SHIPPED | OUTPATIENT
Start: 2025-05-08

## 2025-05-08 RX ORDER — SEMAGLUTIDE 0.25 MG/.5ML
0.25 INJECTION, SOLUTION SUBCUTANEOUS WEEKLY
Qty: 2 ML | Refills: 0 | OUTPATIENT
Start: 2025-05-08

## 2025-05-08 NOTE — TELEPHONE ENCOUNTER
Pt returned call, message in chart from provider relayed.  Patient did start medication, he just did 3rd dose.  He is not having any side effects and he is ok with increasing dose.

## 2025-05-26 DIAGNOSIS — E66.09 OBESITY DUE TO EXCESS CALORIES WITH SERIOUS COMORBIDITY IN PEDIATRIC PATIENT, UNSPECIFIED BMI: ICD-10-CM

## 2025-05-27 DIAGNOSIS — E66.1 CLASS 1 DRUG-INDUCED OBESITY WITH SERIOUS COMORBIDITY AND BODY MASS INDEX (BMI) OF 34.0 TO 34.9 IN ADULT: Primary | ICD-10-CM

## 2025-05-27 DIAGNOSIS — E66.811 CLASS 1 DRUG-INDUCED OBESITY WITH SERIOUS COMORBIDITY AND BODY MASS INDEX (BMI) OF 34.0 TO 34.9 IN ADULT: Primary | ICD-10-CM

## 2025-05-27 RX ORDER — SEMAGLUTIDE 0.5 MG/.5ML
INJECTION, SOLUTION SUBCUTANEOUS
Qty: 2 ML | Refills: 0 | OUTPATIENT
Start: 2025-05-27

## 2025-05-27 RX ORDER — SEMAGLUTIDE 1 MG/.5ML
INJECTION, SOLUTION SUBCUTANEOUS
Qty: 2 ML | Refills: 0 | Status: SHIPPED | OUTPATIENT
Start: 2025-05-27 | End: 2026-05-01

## 2025-05-27 NOTE — TELEPHONE ENCOUNTER
Requested medication(s) are due for refill today: Yes  Patient has already received a courtesy refill: No  Other reason request has been forwarded to provider: per protocol PATIENT REQUESTED TO TIER TO NEXT DOSE

## 2025-06-22 DIAGNOSIS — E66.1 CLASS 1 DRUG-INDUCED OBESITY WITH SERIOUS COMORBIDITY AND BODY MASS INDEX (BMI) OF 34.0 TO 34.9 IN ADULT: ICD-10-CM

## 2025-06-22 DIAGNOSIS — E66.811 CLASS 1 DRUG-INDUCED OBESITY WITH SERIOUS COMORBIDITY AND BODY MASS INDEX (BMI) OF 34.0 TO 34.9 IN ADULT: ICD-10-CM

## 2025-06-24 DIAGNOSIS — E66.811 CLASS 1 DRUG-INDUCED OBESITY WITHOUT SERIOUS COMORBIDITY WITH BODY MASS INDEX (BMI) OF 34.0 TO 34.9 IN ADULT: Primary | ICD-10-CM

## 2025-06-24 DIAGNOSIS — E66.1 CLASS 1 DRUG-INDUCED OBESITY WITHOUT SERIOUS COMORBIDITY WITH BODY MASS INDEX (BMI) OF 34.0 TO 34.9 IN ADULT: Primary | ICD-10-CM

## 2025-06-24 RX ORDER — SEMAGLUTIDE 1 MG/.5ML
INJECTION, SOLUTION SUBCUTANEOUS
Qty: 2 ML | Refills: 0 | OUTPATIENT
Start: 2025-06-24

## 2025-06-24 RX ORDER — SEMAGLUTIDE 1.7 MG/.75ML
INJECTION, SOLUTION SUBCUTANEOUS
Qty: 3 ML | Refills: 0 | Status: SHIPPED | OUTPATIENT
Start: 2025-06-24

## 2025-06-25 ENCOUNTER — TELEPHONE (OUTPATIENT)
Dept: FAMILY MEDICINE CLINIC | Facility: CLINIC | Age: 60
End: 2025-06-25

## 2025-06-25 NOTE — TELEPHONE ENCOUNTER
Prior Authorization requested for Wegovy 1.7mg. Your patient is due for a follow up visit for medication management and weight check. Patient's last office visit was 4/18/25, before patient started on wegovy. Once visit is completed please send message back to the prior authorization POD so a prior authorization can be submitted. Thank you

## 2025-06-26 NOTE — TELEPHONE ENCOUNTER
Left message to call office-please relay    Patient needs to schedule office visit weight check for insurance to approve Wegovy?

## 2025-07-02 DIAGNOSIS — I10 ESSENTIAL HYPERTENSION: ICD-10-CM

## 2025-07-02 RX ORDER — LOSARTAN POTASSIUM 100 MG/1
100 TABLET ORAL DAILY
Qty: 100 TABLET | Refills: 1 | Status: SHIPPED | OUTPATIENT
Start: 2025-07-02

## 2025-07-10 DIAGNOSIS — E66.1 CLASS 1 DRUG-INDUCED OBESITY WITHOUT SERIOUS COMORBIDITY WITH BODY MASS INDEX (BMI) OF 34.0 TO 34.9 IN ADULT: ICD-10-CM

## 2025-07-10 DIAGNOSIS — E66.811 CLASS 1 DRUG-INDUCED OBESITY WITHOUT SERIOUS COMORBIDITY WITH BODY MASS INDEX (BMI) OF 34.0 TO 34.9 IN ADULT: ICD-10-CM

## 2025-07-11 RX ORDER — SEMAGLUTIDE 1.7 MG/.75ML
INJECTION, SOLUTION SUBCUTANEOUS
Qty: 3 ML | Refills: 0 | Status: SHIPPED | OUTPATIENT
Start: 2025-07-11

## 2025-07-20 DIAGNOSIS — E66.1 CLASS 1 DRUG-INDUCED OBESITY WITHOUT SERIOUS COMORBIDITY WITH BODY MASS INDEX (BMI) OF 34.0 TO 34.9 IN ADULT: ICD-10-CM

## 2025-07-20 DIAGNOSIS — E66.811 CLASS 1 DRUG-INDUCED OBESITY WITHOUT SERIOUS COMORBIDITY WITH BODY MASS INDEX (BMI) OF 34.0 TO 34.9 IN ADULT: ICD-10-CM

## 2025-07-22 RX ORDER — SEMAGLUTIDE 1.7 MG/.75ML
INJECTION, SOLUTION SUBCUTANEOUS
Qty: 3 ML | Refills: 0 | Status: SHIPPED | OUTPATIENT
Start: 2025-07-22

## 2025-08-04 ENCOUNTER — TELEPHONE (OUTPATIENT)
Dept: GASTROENTEROLOGY | Facility: AMBULARY SURGERY CENTER | Age: 60
End: 2025-08-04

## 2025-08-04 DIAGNOSIS — E66.1 CLASS 1 DRUG-INDUCED OBESITY WITHOUT SERIOUS COMORBIDITY WITH BODY MASS INDEX (BMI) OF 34.0 TO 34.9 IN ADULT: ICD-10-CM

## 2025-08-04 DIAGNOSIS — E66.811 CLASS 1 DRUG-INDUCED OBESITY WITHOUT SERIOUS COMORBIDITY WITH BODY MASS INDEX (BMI) OF 34.0 TO 34.9 IN ADULT: ICD-10-CM

## 2025-08-05 RX ORDER — SEMAGLUTIDE 1.7 MG/.75ML
INJECTION, SOLUTION SUBCUTANEOUS
Qty: 3 ML | Refills: 0 | OUTPATIENT
Start: 2025-08-05

## 2025-08-13 ENCOUNTER — APPOINTMENT (OUTPATIENT)
Dept: LAB | Facility: CLINIC | Age: 60
End: 2025-08-13
Payer: COMMERCIAL

## 2025-08-13 LAB
ALBUMIN SERPL BCG-MCNC: 4 G/DL (ref 3.5–5)
ALP SERPL-CCNC: 74 U/L (ref 34–104)
ALT SERPL W P-5'-P-CCNC: 19 U/L (ref 7–52)
ANION GAP SERPL CALCULATED.3IONS-SCNC: 5 MMOL/L (ref 4–13)
AST SERPL W P-5'-P-CCNC: 16 U/L (ref 13–39)
BASOPHILS # BLD AUTO: 0.05 THOUSANDS/ÂΜL (ref 0–0.1)
BASOPHILS NFR BLD AUTO: 1 % (ref 0–1)
BILIRUB SERPL-MCNC: 0.57 MG/DL (ref 0.2–1)
BUN SERPL-MCNC: 13 MG/DL (ref 5–25)
CALCIUM SERPL-MCNC: 9.6 MG/DL (ref 8.4–10.2)
CHLORIDE SERPL-SCNC: 105 MMOL/L (ref 96–108)
CHOLEST SERPL-MCNC: 152 MG/DL (ref ?–200)
CO2 SERPL-SCNC: 29 MMOL/L (ref 21–32)
CREAT SERPL-MCNC: 1.1 MG/DL (ref 0.6–1.3)
EOSINOPHIL # BLD AUTO: 0.47 THOUSAND/ÂΜL (ref 0–0.61)
EOSINOPHIL NFR BLD AUTO: 7 % (ref 0–6)
ERYTHROCYTE [DISTWIDTH] IN BLOOD BY AUTOMATED COUNT: 13.1 % (ref 11.6–15.1)
EST. AVERAGE GLUCOSE BLD GHB EST-MCNC: 126 MG/DL
GFR SERPL CREATININE-BSD FRML MDRD: 72 ML/MIN/1.73SQ M
GLUCOSE P FAST SERPL-MCNC: 90 MG/DL (ref 65–99)
HBA1C MFR BLD: 6 %
HCT VFR BLD AUTO: 48 % (ref 36.5–49.3)
HDLC SERPL-MCNC: 46 MG/DL
HGB BLD-MCNC: 16 G/DL (ref 12–17)
IMM GRANULOCYTES # BLD AUTO: 0.02 THOUSAND/UL (ref 0–0.2)
IMM GRANULOCYTES NFR BLD AUTO: 0 % (ref 0–2)
LDLC SERPL CALC-MCNC: 90 MG/DL (ref 0–100)
LYMPHOCYTES # BLD AUTO: 1.33 THOUSANDS/ÂΜL (ref 0.6–4.47)
LYMPHOCYTES NFR BLD AUTO: 18 % (ref 14–44)
MCH RBC QN AUTO: 31.3 PG (ref 26.8–34.3)
MCHC RBC AUTO-ENTMCNC: 33.3 G/DL (ref 31.4–37.4)
MCV RBC AUTO: 94 FL (ref 82–98)
MONOCYTES # BLD AUTO: 0.81 THOUSAND/ÂΜL (ref 0.17–1.22)
MONOCYTES NFR BLD AUTO: 11 % (ref 4–12)
NEUTROPHILS # BLD AUTO: 4.6 THOUSANDS/ÂΜL (ref 1.85–7.62)
NEUTS SEG NFR BLD AUTO: 63 % (ref 43–75)
NONHDLC SERPL-MCNC: 106 MG/DL
NRBC BLD AUTO-RTO: 0 /100 WBCS
PLATELET # BLD AUTO: 381 THOUSANDS/UL (ref 149–390)
PMV BLD AUTO: 9.3 FL (ref 8.9–12.7)
POTASSIUM SERPL-SCNC: 4.9 MMOL/L (ref 3.5–5.3)
PROT SERPL-MCNC: 6.6 G/DL (ref 6.4–8.4)
RBC # BLD AUTO: 5.11 MILLION/UL (ref 3.88–5.62)
SODIUM SERPL-SCNC: 139 MMOL/L (ref 135–147)
TRIGL SERPL-MCNC: 81 MG/DL (ref ?–150)
WBC # BLD AUTO: 7.28 THOUSAND/UL (ref 4.31–10.16)

## 2025-08-14 ENCOUNTER — OFFICE VISIT (OUTPATIENT)
Dept: FAMILY MEDICINE CLINIC | Facility: CLINIC | Age: 60
End: 2025-08-14
Payer: COMMERCIAL